# Patient Record
Sex: FEMALE | Race: WHITE | Employment: OTHER | URBAN - METROPOLITAN AREA
[De-identification: names, ages, dates, MRNs, and addresses within clinical notes are randomized per-mention and may not be internally consistent; named-entity substitution may affect disease eponyms.]

---

## 2020-11-09 ENCOUNTER — APPOINTMENT (OUTPATIENT)
Dept: GENERAL RADIOLOGY | Age: 30
DRG: 720 | End: 2020-11-09
Attending: STUDENT IN AN ORGANIZED HEALTH CARE EDUCATION/TRAINING PROGRAM
Payer: MEDICAID

## 2020-11-09 ENCOUNTER — HOSPITAL ENCOUNTER (INPATIENT)
Age: 30
LOS: 8 days | Discharge: HOME | DRG: 720 | End: 2020-11-17
Attending: STUDENT IN AN ORGANIZED HEALTH CARE EDUCATION/TRAINING PROGRAM | Admitting: FAMILY MEDICINE
Payer: MEDICAID

## 2020-11-09 ENCOUNTER — APPOINTMENT (OUTPATIENT)
Dept: GENERAL RADIOLOGY | Age: 30
DRG: 720 | End: 2020-11-09
Attending: FAMILY MEDICINE
Payer: MEDICAID

## 2020-11-09 DIAGNOSIS — R56.9 SEIZURE (HCC): Primary | ICD-10-CM

## 2020-11-09 DIAGNOSIS — R41.82 ALTERED MENTAL STATUS, UNSPECIFIED ALTERED MENTAL STATUS TYPE: ICD-10-CM

## 2020-11-09 DIAGNOSIS — N30.01 ACUTE CYSTITIS WITH HEMATURIA: ICD-10-CM

## 2020-11-09 DIAGNOSIS — A41.9 SEPSIS, DUE TO UNSPECIFIED ORGANISM, UNSPECIFIED WHETHER ACUTE ORGAN DYSFUNCTION PRESENT (HCC): ICD-10-CM

## 2020-11-09 DIAGNOSIS — J69.0 ASPIRATION PNEUMONIA, UNSPECIFIED ASPIRATION PNEUMONIA TYPE, UNSPECIFIED LATERALITY, UNSPECIFIED PART OF LUNG (HCC): ICD-10-CM

## 2020-11-09 PROBLEM — N39.0 UTI (URINARY TRACT INFECTION): Status: ACTIVE | Noted: 2020-11-09

## 2020-11-09 LAB
ALBUMIN SERPL-MCNC: 3.5 G/DL (ref 3.5–5)
ALBUMIN/GLOB SERPL: 0.8 {RATIO} (ref 1.1–2.2)
ALP SERPL-CCNC: 57 U/L (ref 45–117)
ALT SERPL-CCNC: 74 U/L (ref 12–78)
ANION GAP SERPL CALC-SCNC: 13 MMOL/L (ref 5–15)
APPEARANCE UR: ABNORMAL
AST SERPL W P-5'-P-CCNC: 147 U/L (ref 15–37)
BACTERIA URNS QL MICRO: ABNORMAL /HPF
BACTERIA URNS QL MICRO: NEGATIVE /HPF
BASOPHILS # BLD: 0 K/UL (ref 0–0.1)
BASOPHILS NFR BLD: 0 % (ref 0–1)
BILIRUB SERPL-MCNC: 0.5 MG/DL (ref 0.2–1)
BILIRUB UR QL: NEGATIVE
BUN SERPL-MCNC: 29 MG/DL (ref 6–20)
BUN/CREAT SERPL: 18 (ref 12–20)
CA-I BLD-MCNC: 8.7 MG/DL (ref 8.5–10.1)
CHLORIDE SERPL-SCNC: 121 MMOL/L (ref 97–108)
CO2 SERPL-SCNC: 15 MMOL/L (ref 21–32)
COLOR UR: ABNORMAL
CREAT SERPL-MCNC: 1.58 MG/DL (ref 0.55–1.02)
DIFFERENTIAL METHOD BLD: ABNORMAL
EOSINOPHIL # BLD: 0 K/UL (ref 0–0.4)
EOSINOPHIL NFR BLD: 0 % (ref 0–7)
ERYTHROCYTE [DISTWIDTH] IN BLOOD BY AUTOMATED COUNT: 13.5 % (ref 11.5–14.5)
GLOBULIN SER CALC-MCNC: 4.4 G/DL (ref 2–4)
GLUCOSE SERPL-MCNC: 72 MG/DL (ref 65–100)
GLUCOSE UR STRIP.AUTO-MCNC: NEGATIVE MG/DL
HCT VFR BLD AUTO: 41.8 % (ref 35–47)
HGB BLD-MCNC: 13.2 G/DL (ref 11.5–16)
HGB UR QL STRIP: ABNORMAL
IMM GRANULOCYTES # BLD AUTO: 0 K/UL (ref 0–0.04)
IMM GRANULOCYTES NFR BLD AUTO: 0 % (ref 0–0.5)
KETONES UR QL STRIP.AUTO: 80 MG/DL
LACTATE SERPL-SCNC: 0.9 MMOL/L (ref 0.4–2)
LACTATE SERPL-SCNC: 2.5 MMOL/L (ref 0.4–2)
LEUKOCYTE ESTERASE UR QL STRIP.AUTO: ABNORMAL
LYMPHOCYTES # BLD: 1 K/UL (ref 0.8–3.5)
LYMPHOCYTES NFR BLD: 7 % (ref 12–49)
MCH RBC QN AUTO: 30.6 PG (ref 26–34)
MCHC RBC AUTO-ENTMCNC: 31.6 G/DL (ref 30–36.5)
MCV RBC AUTO: 97 FL (ref 80–99)
MONOCYTES # BLD: 0.7 K/UL (ref 0–1)
MONOCYTES NFR BLD: 5 % (ref 5–13)
MUCOUS THREADS URNS QL MICRO: ABNORMAL /LPF
NEUTS SEG # BLD: 13 K/UL (ref 1.8–8)
NEUTS SEG NFR BLD: 88 % (ref 32–75)
NITRITE UR QL STRIP.AUTO: NEGATIVE
PH UR STRIP: 5 [PH] (ref 5–8)
PLATELET # BLD AUTO: 234 K/UL (ref 150–400)
PMV BLD AUTO: 11.3 FL (ref 8.9–12.9)
POTASSIUM SERPL-SCNC: 3.6 MMOL/L (ref 3.5–5.1)
PROT SERPL-MCNC: 7.9 G/DL (ref 6.4–8.2)
PROT UR STRIP-MCNC: 100 MG/DL
RBC # BLD AUTO: 4.31 M/UL (ref 3.8–5.2)
RBC #/AREA URNS HPF: 59 /HPF (ref 0–5)
RBC #/AREA URNS HPF: ABNORMAL /HPF (ref 0–5)
SARS-COV-2, COV2: NORMAL
SODIUM SERPL-SCNC: 149 MMOL/L (ref 136–145)
SP GR UR REFRACTOMETRY: 1.02 (ref 1–1.03)
UROBILINOGEN UR QL STRIP.AUTO: 0.1 EU/DL (ref 0.1–1)
WBC # BLD AUTO: 14.7 K/UL (ref 3.6–11)
WBC URNS QL MICRO: 9 /HPF (ref 0–4)
WBC URNS QL MICRO: ABNORMAL /HPF (ref 0–4)

## 2020-11-09 PROCEDURE — 83605 ASSAY OF LACTIC ACID: CPT

## 2020-11-09 PROCEDURE — 74011250636 HC RX REV CODE- 250/636: Performed by: FAMILY MEDICINE

## 2020-11-09 PROCEDURE — 74011250636 HC RX REV CODE- 250/636: Performed by: STUDENT IN AN ORGANIZED HEALTH CARE EDUCATION/TRAINING PROGRAM

## 2020-11-09 PROCEDURE — 74011000250 HC RX REV CODE- 250: Performed by: FAMILY MEDICINE

## 2020-11-09 PROCEDURE — 74011000258 HC RX REV CODE- 258: Performed by: STUDENT IN AN ORGANIZED HEALTH CARE EDUCATION/TRAINING PROGRAM

## 2020-11-09 PROCEDURE — 74011250637 HC RX REV CODE- 250/637: Performed by: STUDENT IN AN ORGANIZED HEALTH CARE EDUCATION/TRAINING PROGRAM

## 2020-11-09 PROCEDURE — 65270000029 HC RM PRIVATE

## 2020-11-09 PROCEDURE — 80053 COMPREHEN METABOLIC PANEL: CPT

## 2020-11-09 PROCEDURE — 85025 COMPLETE CBC W/AUTO DIFF WBC: CPT

## 2020-11-09 PROCEDURE — 87040 BLOOD CULTURE FOR BACTERIA: CPT

## 2020-11-09 PROCEDURE — 99284 EMERGENCY DEPT VISIT MOD MDM: CPT

## 2020-11-09 PROCEDURE — 74011250637 HC RX REV CODE- 250/637: Performed by: FAMILY MEDICINE

## 2020-11-09 PROCEDURE — 96365 THER/PROPH/DIAG IV INF INIT: CPT

## 2020-11-09 PROCEDURE — 36415 COLL VENOUS BLD VENIPUNCTURE: CPT

## 2020-11-09 PROCEDURE — 74011250636 HC RX REV CODE- 250/636

## 2020-11-09 PROCEDURE — 71045 X-RAY EXAM CHEST 1 VIEW: CPT

## 2020-11-09 PROCEDURE — 81001 URINALYSIS AUTO W/SCOPE: CPT

## 2020-11-09 PROCEDURE — 87635 SARS-COV-2 COVID-19 AMP PRB: CPT

## 2020-11-09 RX ORDER — SODIUM CHLORIDE 9 MG/ML
75 INJECTION, SOLUTION INTRAVENOUS CONTINUOUS
Status: DISCONTINUED | OUTPATIENT
Start: 2020-11-09 | End: 2020-11-11 | Stop reason: SDUPTHER

## 2020-11-09 RX ORDER — ONDANSETRON 4 MG/1
4 TABLET, ORALLY DISINTEGRATING ORAL
Status: DISCONTINUED | OUTPATIENT
Start: 2020-11-09 | End: 2020-11-17 | Stop reason: HOSPADM

## 2020-11-09 RX ORDER — POLYETHYLENE GLYCOL 3350 17 G/17G
17 POWDER, FOR SOLUTION ORAL DAILY PRN
Status: DISCONTINUED | OUTPATIENT
Start: 2020-11-09 | End: 2020-11-17 | Stop reason: HOSPADM

## 2020-11-09 RX ORDER — ACETAMINOPHEN 650 MG/1
650 SUPPOSITORY RECTAL
Status: COMPLETED | OUTPATIENT
Start: 2020-11-09 | End: 2020-11-09

## 2020-11-09 RX ORDER — LORAZEPAM 2 MG/ML
2 INJECTION INTRAMUSCULAR ONCE
Status: ACTIVE | OUTPATIENT
Start: 2020-11-09 | End: 2020-11-09

## 2020-11-09 RX ORDER — ACETAMINOPHEN 325 MG/1
650 TABLET ORAL
Status: DISCONTINUED | OUTPATIENT
Start: 2020-11-09 | End: 2020-11-10

## 2020-11-09 RX ORDER — ONDANSETRON 2 MG/ML
4 INJECTION INTRAMUSCULAR; INTRAVENOUS
Status: DISCONTINUED | OUTPATIENT
Start: 2020-11-09 | End: 2020-11-17 | Stop reason: HOSPADM

## 2020-11-09 RX ORDER — LEVETIRACETAM 15 MG/ML
1500 INJECTION INTRAVASCULAR EVERY 12 HOURS
Status: DISCONTINUED | OUTPATIENT
Start: 2020-11-09 | End: 2020-11-14

## 2020-11-09 RX ORDER — TOPIRAMATE 100 MG/1
400 TABLET, FILM COATED ORAL DAILY
COMMUNITY

## 2020-11-09 RX ORDER — ENOXAPARIN SODIUM 100 MG/ML
40 INJECTION SUBCUTANEOUS DAILY
Status: DISCONTINUED | OUTPATIENT
Start: 2020-11-10 | End: 2020-11-17 | Stop reason: HOSPADM

## 2020-11-09 RX ORDER — LEVETIRACETAM 10 MG/ML
1000 INJECTION INTRAVASCULAR ONCE
Status: COMPLETED | OUTPATIENT
Start: 2020-11-09 | End: 2020-11-09

## 2020-11-09 RX ORDER — TOPIRAMATE 200 MG/1
200 TABLET ORAL DAILY
COMMUNITY

## 2020-11-09 RX ORDER — LEVETIRACETAM 500 MG/1
1500 TABLET ORAL 2 TIMES DAILY
Status: DISCONTINUED | OUTPATIENT
Start: 2020-11-09 | End: 2020-11-14

## 2020-11-09 RX ORDER — LEVETIRACETAM 500 MG/1
1500 TABLET ORAL 2 TIMES DAILY
COMMUNITY

## 2020-11-09 RX ORDER — TOPIRAMATE 100 MG/1
200 TABLET, FILM COATED ORAL
Status: DISCONTINUED | OUTPATIENT
Start: 2020-11-09 | End: 2020-11-17 | Stop reason: HOSPADM

## 2020-11-09 RX ORDER — ACETAMINOPHEN 650 MG/1
650 SUPPOSITORY RECTAL
Status: DISCONTINUED | OUTPATIENT
Start: 2020-11-09 | End: 2020-11-10

## 2020-11-09 RX ORDER — TOPIRAMATE 100 MG/1
400 TABLET, FILM COATED ORAL
Status: DISCONTINUED | OUTPATIENT
Start: 2020-11-10 | End: 2020-11-17 | Stop reason: HOSPADM

## 2020-11-09 RX ORDER — LEVETIRACETAM 10 MG/ML
INJECTION INTRAVASCULAR
Status: COMPLETED
Start: 2020-11-09 | End: 2020-11-09

## 2020-11-09 RX ORDER — SODIUM BICARBONATE 1 MEQ/ML
50 SYRINGE (ML) INTRAVENOUS ONCE
Status: COMPLETED | OUTPATIENT
Start: 2020-11-09 | End: 2020-11-09

## 2020-11-09 RX ADMIN — SODIUM CHLORIDE 1000 ML: 9 INJECTION, SOLUTION INTRAVENOUS at 13:50

## 2020-11-09 RX ADMIN — ACETAMINOPHEN 650 MG: 650 SUPPOSITORY RECTAL at 09:55

## 2020-11-09 RX ADMIN — SODIUM CHLORIDE 75 ML/HR: 9 INJECTION, SOLUTION INTRAVENOUS at 19:26

## 2020-11-09 RX ADMIN — LEVETIRACETAM 1000 MG: 10 INJECTION INTRAVENOUS at 08:52

## 2020-11-09 RX ADMIN — LEVETIRACETAM 1000 MG: 10 INJECTION INTRAVASCULAR at 08:52

## 2020-11-09 RX ADMIN — CEFTRIAXONE 1 G: 1 INJECTION, POWDER, FOR SOLUTION INTRAMUSCULAR; INTRAVENOUS at 11:31

## 2020-11-09 RX ADMIN — SODIUM CHLORIDE 1000 ML: 9 INJECTION, SOLUTION INTRAVENOUS at 11:31

## 2020-11-09 RX ADMIN — SODIUM BICARBONATE 50 MEQ: 84 INJECTION, SOLUTION INTRAVENOUS at 13:47

## 2020-11-09 NOTE — H&P
History and Physical    NAME: Malik Tucker   :  1990   MRN:  [id number]     Date/Time:  2020 1:19 PM    Patient PCP: Luan Paget, MD  ______________________________________________________________________             Subjective:     CHIEF COMPLAINT:     Seizure, fever, vomiting    HISTORY OF PRESENT ILLNESS:       Patient is a 35y.o. year old female with a past medical history of Angelman syndrome and seizures presents to the ER with vomiting and fever. Symptoms started last night at 8 pm last night. The farther noticed that she was lethargic and not acting herself. She vomited 4 times, with black coloration. Father took her temperature last night at 101, then 104. She is not eating or drinking well. She had a seizure in the ER and was treated with Ativan 2mg and Keppra 1g IV. Family went to [facility] last week and he noticed diarrhea in her diaper. He also notes that they hired a new nanny 2 weeks ago, and thinks maybe she does not know how to properly clean his daughter. Patient is nonverbal at baseline, bed bound, and has frequent seizures. History acquired from father    No note of coughing, SOB, or chest pain. Father says she has not complained of abdominal pain or flank pain. Past Medical History:   Diagnosis Date    Angelman's syndrome     [facility]     [facility] ([facility]         No past surgical history on file. Social History     Tobacco Use    Smoking status: Never Smoker    Smokeless tobacco: Never Used   Substance Use Topics    Alcohol use: Never     Frequency: Never        No family history on file.     No Known Allergies     Prior to Admission medications    Not on File         Current Facility-Administered Medications:     LORazepam (ATIVAN) injection 2 mg, 2 mg, IntraVENous, ONCE, Zelensky, Judieth Gosselin, MD    sodium bicarbonate 8.4 % (1 mEq/mL) injection 50 mEq, 50 mEq, IntraVENous, ONCE, Unique Carpio MD    cefTRIAXone (ROCEPHIN) 1 g in 0.9% sodium chloride (MBP/ADV) 50 mL MBP, 1 g, IntraVENous, Q24H, Unique Carpio MD  No current outpatient medications on file. LAB DATA REVIEWED:    Recent Results (from the past 24 hour(s))   URINALYSIS W/ RFLX MICROSCOPIC    Collection Time: 11/09/20  8:30 AM   Result Value Ref Range    Color Yellow/Straw      Appearance Turbid (A) Clear      Specific gravity 1.020 1.003 - 1.030      pH (UA) 5.0 5.0 - 8.0      Protein 100 (A) Negative mg/dL    Glucose Negative Negative mg/dL    Ketone 80 (A) Negative mg/dL    Bilirubin Negative Negative      Blood Large (A) Negative      Urobilinogen 0.1 0.1 - 1.0 EU/dL    Nitrites Negative Negative      Leukocyte Esterase Trace (A) Negative      WBC 5-10 0 - 4 /hpf    RBC  0 - 5 /hpf    Bacteria Negative Negative /hpf    Mucus Trace /lpf   URINE MICROSCOPIC    Collection Time: 11/09/20  8:30 AM   Result Value Ref Range    WBC 9 (H) 0 - 4 /hpf    RBC 59 (H) 0 - 5 /hpf    Bacteria Rare (A) Negative /hpf   LACTIC ACID    Collection Time: 11/09/20  9:15 AM   Result Value Ref Range    Lactic acid 2.5 (HH) 0.4 - 2.0 mmol/L   CBC WITH AUTOMATED DIFF    Collection Time: 11/09/20  9:15 AM   Result Value Ref Range    WBC 14.7 (H) 3.6 - 11.0 K/uL    RBC 4.31 3.80 - 5.20 M/uL    HGB 13.2 11.5 - 16.0 g/dL    HCT 41.8 35.0 - 47.0 %    MCV 97.0 80.0 - 99.0 FL    MCH 30.6 26.0 - 34.0 PG    MCHC 31.6 30.0 - 36.5 g/dL    RDW 13.5 11.5 - 14.5 %    PLATELET 984 084 - 524 K/uL    MPV 11.3 8.9 - 12.9 FL    NEUTROPHILS 88 (H) 32 - 75 %    LYMPHOCYTES 7 (L) 12 - 49 %    MONOCYTES 5 5 - 13 %    EOSINOPHILS 0 0 - 7 %    BASOPHILS 0 0 - 1 %    IMMATURE GRANULOCYTES 0 0.0 - 0.5 %    ABS. NEUTROPHILS 13.0 (H) 1.8 - 8.0 K/UL    ABS. LYMPHOCYTES 1.0 0.8 - 3.5 K/UL    ABS. MONOCYTES 0.7 0.0 - 1.0 K/UL    ABS. EOSINOPHILS 0.0 0.0 - 0.4 K/UL    ABS. BASOPHILS 0.0 0.0 - 0.1 K/UL    ABS. IMM.  GRANS. 0.0 0.00 - 0.04 K/UL    DF AUTOMATED     METABOLIC PANEL, COMPREHENSIVE    Collection Time: 11/09/20  9:15 AM   Result Value Ref Range    Sodium 149 (H) 136 - 145 mmol/L    Potassium 3.6 3.5 - 5.1 mmol/L    Chloride 121 (H) 97 - 108 mmol/L    CO2 15 (LL) 21 - 32 mmol/L    Anion gap 13 5 - 15 mmol/L    Glucose 72 65 - 100 mg/dL    BUN 29 (H) 6 - 20 mg/dL    Creatinine 1.58 (H) 0.55 - 1.02 mg/dL    BUN/Creatinine ratio 18 12 - 20      GFR est AA 47 (L) >60 ml/min/1.73m2    GFR est non-AA 39 (L) >60 ml/min/1.73m2    Calcium 8.7 8.5 - 10.1 mg/dL    Bilirubin, total 0.5 0.2 - 1.0 mg/dL    AST (SGOT) 147 (H) 15 - 37 U/L    ALT (SGPT) 74 12 - 78 U/L    Alk. phosphatase 57 45 - 117 U/L    Protein, total 7.9 6.4 - 8.2 g/dL    Albumin 3.5 3.5 - 5.0 g/dL    Globulin 4.4 (H) 2.0 - 4.0 g/dL    A-G Ratio 0.8 (L) 1.1 - 2.2         XR Results (most recent):  No results found for this or any previous visit. No orders to display        Review of Systems:    Unable to obtain from patient. Objective:   VITALS:    Visit Vitals  /81   Pulse 83   Temp 99.7 °F (37.6 °C)   Resp 26   Ht 5' (1.524 m)   Wt 51.7 kg (114 lb)   SpO2 100%   BMI 22.26 kg/m²       Physical Exam:   Constitutional: pt is awake in the bed, eyes open  HENT:   Head: Normocephalic and atraumatic. Eyes: Pupils are equal, round, and reactive to light. EOM are normal.   Cardiovascular: Normal rate, regular rhythm and normal heart sounds. Pulmonary/Chest: Breath sounds normal. No wheezes. No rales. Exhibits no tenderness. Abdominal: Soft. Bowel sounds are normal. There is no abdominal tenderness. There is no rebound and no guarding. Musculoskeletal: Normal range of motion.    Neurological: Lethargic      ASSESSMENT & PLAN:    Sepsis  Complicated UTI  History of Seizures  Angelman Syndrome  Lactic acidosis  Metabolic acidosis      Ceftriaxone 1g IV daily  Bicarb 8.4 mEq IV dose  Lorazepam 2mg IV prn   Blood cultures  Chest Xray  COVID rule out  Urine culture  Neurology consult      Current Facility-Administered Medications:     LORazepam (ATIVAN) injection 2 mg, 2 mg, IntraVENous, ONCE, Roland Peacock MD    sodium bicarbonate 8.4 % (1 mEq/mL) injection 50 mEq, 50 mEq, IntraVENous, ONCE, Unique Carpio MD    cefTRIAXone (ROCEPHIN) 1 g in 0.9% sodium chloride (MBP/ADV) 50 mL MBP, 1 g, IntraVENous, Q24H, Haroon Carpio MD  No current outpatient medications on file.      ________________________________________________________________________    Signed: Hernando Ferrer MD

## 2020-11-09 NOTE — ACP (ADVANCE CARE PLANNING)
11/9/20. CM spoke with pts father/healthcare decision maker  Olivia Sheth @ [phone]) who is visiting. His back up is Manoj Mendoza @ [phone]. Performance is for CPR if pts heart stops & Ventilator Support if health worsened from Covid 19 recovery likely or not.

## 2020-11-09 NOTE — ED PROVIDER NOTES
EMERGENCY DEPARTMENT HISTORY AND PHYSICAL EXAM      Date: 11/9/2020  Patient Name: Jayden Zhou    History of Presenting Illness     Chief Complaint   Patient presents with    Seizure    Fever    Vomiting     History Provided By: Patient's Father    HPI: Jayden Zhou, 35 y.o. female with a past medical history significant Angelman syndrome presents to the ED with cc of fever, vomiting, seizure. Patient's father presents history, states overnight he noted patient to be febrile to 104. Noted some vomitus near mouth. Patient nonverbal at baseline bedbound, has frequent seizures, according to father approximately 30-40 a day. Patient actively seizing when EMS arrived, was given Versed 2.5 mg, with cessation of seizure. On presentation patient had another seizure. Was given Ativan 2 mg, and Keppra 1 g IV. Father states patient takes 1500 mg of Keppra twice a day, has not taken in 24 hours. No note of any coughing, no obvious shortness of breath, patient has been eating less over the last several days. There are no other complaints, changes, or physical findings at this time. PCP: Britney, MD Carmen    Current Facility-Administered Medications   Medication Dose Route Frequency Provider Last Rate Last Dose    LORazepam (ATIVAN) injection 2 mg  2 mg IntraVENous ONCE Sae Franklin MD        sodium chloride 0.9 % bolus infusion 1,000 mL  1,000 mL IntraVENous ONCE Leah ALBARADO MD        cefTRIAXone (ROCEPHIN) 1 g in 0.9% sodium chloride (MBP/ADV) 50 mL MBP  1 g IntraVENous NOW Natividad Mcfadden MD           Past History     Past Medical History:  Past Medical History:   Diagnosis Date    Angelman's syndrome     [facility]     [facility]izures [facility]        Past Surgical History:  No past surgical history on file. Family History:  No family history on file.     Social History:  Social History     Tobacco Use    Smoking status: Never Smoker    Smokeless tobacco: Never Used   Substance Use Topics    Alcohol use: Never     Frequency: Never    Drug use: Never       Allergies:  No Known Allergies      Review of Systems     Review of Systems   Unable to perform ROS: Patient nonverbal       Physical Exam     Physical Exam  Vitals signs and nursing note reviewed. Constitutional:       General: She is sleeping. Appearance: Normal appearance. She is underweight. HENT:      Head: Normocephalic and atraumatic. Nose: Nose normal.      Mouth/Throat:      Mouth: Mucous membranes are moist.   Eyes:      Pupils: Pupils are equal, round, and reactive to light. Neck:      Musculoskeletal: No neck rigidity. Cardiovascular:      Rate and Rhythm: Normal rate and regular rhythm. Pulmonary:      Effort: Pulmonary effort is normal.      Breath sounds: Normal breath sounds. Abdominal:      General: Abdomen is flat. Bowel sounds are normal.      Palpations: Abdomen is soft. Musculoskeletal:         General: No swelling, tenderness or deformity. Lymphadenopathy:      Cervical: No cervical adenopathy. Skin:     General: Skin is warm and dry. Capillary Refill: Capillary refill takes less than 2 seconds. Neurological:      Mental Status: She is lethargic. Comments: Extremities contracted, at baseline.              Diagnostic Study Results     Labs -     Recent Results (from the past 12 hour(s))   URINALYSIS W/ RFLX MICROSCOPIC    Collection Time: 11/09/20  8:30 AM   Result Value Ref Range    Color Yellow/Straw      Appearance Turbid (A) Clear      Specific gravity 1.020 1.003 - 1.030      pH (UA) 5.0 5.0 - 8.0      Protein 100 (A) Negative mg/dL    Glucose Negative Negative mg/dL    Ketone 80 (A) Negative mg/dL    Bilirubin Negative Negative      Blood Large (A) Negative      Urobilinogen 0.1 0.1 - 1.0 EU/dL    Nitrites Negative Negative      Leukocyte Esterase Trace (A) Negative      WBC 5-10 0 - 4 /hpf    RBC  0 - 5 /hpf    Bacteria Negative Negative /hpf    Mucus Trace /lpf   URINE MICROSCOPIC    Collection Time: 11/09/20  8:30 AM   Result Value Ref Range    WBC PENDING /hpf    RBC PENDING /hpf    Bacteria PENDING /hpf   LACTIC ACID    Collection Time: 11/09/20  9:15 AM   Result Value Ref Range    Lactic acid 2.5 (HH) 0.4 - 2.0 mmol/L   CBC WITH AUTOMATED DIFF    Collection Time: 11/09/20  9:15 AM   Result Value Ref Range    WBC 14.7 (H) 3.6 - 11.0 K/uL    RBC 4.31 3.80 - 5.20 M/uL    HGB 13.2 11.5 - 16.0 g/dL    HCT 41.8 35.0 - 47.0 %    MCV 97.0 80.0 - 99.0 FL    MCH 30.6 26.0 - 34.0 PG    MCHC 31.6 30.0 - 36.5 g/dL    RDW 13.5 11.5 - 14.5 %    PLATELET 282 640 - 312 K/uL    MPV 11.3 8.9 - 12.9 FL    NEUTROPHILS 88 (H) 32 - 75 %    LYMPHOCYTES 7 (L) 12 - 49 %    MONOCYTES 5 5 - 13 %    EOSINOPHILS 0 0 - 7 %    BASOPHILS 0 0 - 1 %    IMMATURE GRANULOCYTES 0 0.0 - 0.5 %    ABS. NEUTROPHILS 13.0 (H) 1.8 - 8.0 K/UL    ABS. LYMPHOCYTES 1.0 0.8 - 3.5 K/UL    ABS. MONOCYTES 0.7 0.0 - 1.0 K/UL    ABS. EOSINOPHILS 0.0 0.0 - 0.4 K/UL    ABS. BASOPHILS 0.0 0.0 - 0.1 K/UL    ABS. IMM. GRANS. 0.0 0.00 - 0.04 K/UL    DF AUTOMATED     METABOLIC PANEL, COMPREHENSIVE    Collection Time: 11/09/20  9:15 AM   Result Value Ref Range    Sodium 149 (H) 136 - 145 mmol/L    Potassium 3.6 3.5 - 5.1 mmol/L    Chloride 121 (H) 97 - 108 mmol/L    CO2 15 (LL) 21 - 32 mmol/L    Anion gap 13 5 - 15 mmol/L    Glucose 72 65 - 100 mg/dL    BUN 29 (H) 6 - 20 mg/dL    Creatinine 1.58 (H) 0.55 - 1.02 mg/dL    BUN/Creatinine ratio 18 12 - 20      GFR est AA 47 (L) >60 ml/min/1.73m2    GFR est non-AA 39 (L) >60 ml/min/1.73m2    Calcium 8.7 8.5 - 10.1 mg/dL    Bilirubin, total 0.5 0.2 - 1.0 mg/dL    AST (SGOT) 147 (H) 15 - 37 U/L    ALT (SGPT) 74 12 - 78 U/L    Alk.  phosphatase 57 45 - 117 U/L    Protein, total 7.9 6.4 - 8.2 g/dL    Albumin 3.5 3.5 - 5.0 g/dL    Globulin 4.4 (H) 2.0 - 4.0 g/dL    A-G Ratio 0.8 (L) 1.1 - 2.2         Radiologic Studies -   [unfilled]  CT Results  (Last 48 hours)    None        CXR Results  (Last 48 hours)    None          Medical Decision Making and ED Course   I am the first provider for this patient. I reviewed the vital signs, available nursing notes, past medical history, past surgical history, family history and social history. Vital Signs-Reviewed the patient's vital signs. Patient Vitals for the past 12 hrs:   Temp Pulse Resp BP SpO2   11/09/20 0828 99.7 °F (37.6 °C) (!) 107 26 120/78 96 %       Records Reviewed: Nursing Notes    The patient presents with fever with a differential diagnosis of sepsis, UTI, URI, COVID-19 infection      Provider Notes (Medical Decision Making):   32-year-old female, past medical history of Angelman's syndrome, seizure disorder. Patient brought in from by father for evaluation of fever, was febrile to 104 last night, and lethargy. On evaluation patient had another witnessed seizure in the ED, treated with Ativan and Keppra 1 g. With seizure sessation. Patient febrile to 101 rectally, basic lab work drawn including blood cultures, lactate, UA. Will continue to observe patient and follow up laboratory studies. [facility]         ED Course:   Initial assessment performed. The patients presenting problems have been discussed, and they are in agreement with the care plan formulated and outlined with them. I have encouraged them to ask questions as they arise throughout their visit. ED Course as of Nov 09 1115   Mon Nov 09, 2020   1034 Patient's white count elevated 14,000, shift of 88%, lactate elevated 2.5, CMP shows bicarb 15, BUN 29 creatinine 1.58.  UA shows positive ketones, large blood, trace leukoesterase. Potentially UTI could be source. Will treat with Rocephin, given complicated medical history will admit for IV antibiotics and UTI. [PZ]   3020 Given patient's UTI, and multiple medical comorbidities, will admit for IV antibiotics, treat for sepsis. Spoke with Dr. Adelso Aguilar, endorsed admission.      [PZ]      ED Course User Index  [PZ] Diana Ojeda, Pranav Rey MD         Procedures       Hilaria Mane MD  Procedures       DISPO: Admit to hospital          Attestations:    Hilaria Mane MD    Please note that this dictation was completed with [employer], the computer voice recognition software. Quite often unanticipated grammatical, syntax, homophones, and other interpretive errors are inadvertently transcribed by the computer software. Please disregard these errors. Please excuse any errors that have escaped final proofreading. Thank you.

## 2020-11-09 NOTE — H&P
History and Physical    NAME: Malik Tucker   :  1990   MRN:  [id number]     Date/Time:  2020 1:19 PM    Patient PCP: Luan Paget, MD  ______________________________________________________________________             Subjective:     CHIEF COMPLAINT:     Seizure, fever, vomiting    HISTORY OF PRESENT ILLNESS:       Patient is a 35y.o. year old female with a past medical history of Angelman syndrome and seizures presents to the ER with vomiting and fever. Symptoms started last night at 8 pm last night. The farther noticed that she was lethargic and not acting herself. She vomited 4 times, with black coloration. Father took her temperature last night at 101, then 104. She is not eating or drinking well. She had a seizure in the ER and was treated with Ativan 2mg and Keppra 1g IV. Family went to [facility] last week and he noticed diarrhea in her diaper. He also notes that they hired a new nanny 2 weeks ago, and thinks maybe she does not know how to properly clean his daughter. Patient is nonverbal at baseline, bed bound, and has frequent seizures. History acquired from father    No note of coughing, SOB, or chest pain. Father says she has not complained of abdominal pain or flank pain. Past Medical History:   Diagnosis Date    Angelman's syndrome     [facility]     [facility]         No past surgical history on file. Social History     Tobacco Use    Smoking status: Never Smoker    Smokeless tobacco: Never Used   Substance Use Topics    Alcohol use: Never     Frequency: Never        No family history on file. No Known Allergies     Prior to Admission medications    Not on File         Current Facility-Administered Medications:     LORazepam (ATIVAN) injection 2 mg, 2 mg, IntraVENous, ONCE, Zelensky, Judieth Gosselin, MD  No current outpatient medications on file.     LAB DATA REVIEWED:    Recent Results (from the past 24 hour(s))   URINALYSIS W/ RFLX MICROSCOPIC    Collection Time: 11/09/20  8:30 AM   Result Value Ref Range    Color Yellow/Straw      Appearance Turbid (A) Clear      Specific gravity 1.020 1.003 - 1.030      pH (UA) 5.0 5.0 - 8.0      Protein 100 (A) Negative mg/dL    Glucose Negative Negative mg/dL    Ketone 80 (A) Negative mg/dL    Bilirubin Negative Negative      Blood Large (A) Negative      Urobilinogen 0.1 0.1 - 1.0 EU/dL    Nitrites Negative Negative      Leukocyte Esterase Trace (A) Negative      WBC 5-10 0 - 4 /hpf    RBC  0 - 5 /hpf    Bacteria Negative Negative /hpf    Mucus Trace /lpf   URINE MICROSCOPIC    Collection Time: 11/09/20  8:30 AM   Result Value Ref Range    WBC 9 (H) 0 - 4 /hpf    RBC 59 (H) 0 - 5 /hpf    Bacteria Rare (A) Negative /hpf   LACTIC ACID    Collection Time: 11/09/20  9:15 AM   Result Value Ref Range    Lactic acid 2.5 (HH) 0.4 - 2.0 mmol/L   CBC WITH AUTOMATED DIFF    Collection Time: 11/09/20  9:15 AM   Result Value Ref Range    WBC 14.7 (H) 3.6 - 11.0 K/uL    RBC 4.31 3.80 - 5.20 M/uL    HGB 13.2 11.5 - 16.0 g/dL    HCT 41.8 35.0 - 47.0 %    MCV 97.0 80.0 - 99.0 FL    MCH 30.6 26.0 - 34.0 PG    MCHC 31.6 30.0 - 36.5 g/dL    RDW 13.5 11.5 - 14.5 %    PLATELET 029 794 - 695 K/uL    MPV 11.3 8.9 - 12.9 FL    NEUTROPHILS 88 (H) 32 - 75 %    LYMPHOCYTES 7 (L) 12 - 49 %    MONOCYTES 5 5 - 13 %    EOSINOPHILS 0 0 - 7 %    BASOPHILS 0 0 - 1 %    IMMATURE GRANULOCYTES 0 0.0 - 0.5 %    ABS. NEUTROPHILS 13.0 (H) 1.8 - 8.0 K/UL    ABS. LYMPHOCYTES 1.0 0.8 - 3.5 K/UL    ABS. MONOCYTES 0.7 0.0 - 1.0 K/UL    ABS. EOSINOPHILS 0.0 0.0 - 0.4 K/UL    ABS. BASOPHILS 0.0 0.0 - 0.1 K/UL    ABS. IMM.  GRANS. 0.0 0.00 - 0.04 K/UL    DF AUTOMATED     METABOLIC PANEL, COMPREHENSIVE    Collection Time: 11/09/20  9:15 AM   Result Value Ref Range    Sodium 149 (H) 136 - 145 mmol/L    Potassium 3.6 3.5 - 5.1 mmol/L    Chloride 121 (H) 97 - 108 mmol/L    CO2 15 (LL) 21 - 32 mmol/L    Anion gap 13 5 - 15 mmol/L    Glucose 72 65 - 100 mg/dL    BUN 29 (H) 6 - 20 mg/dL    Creatinine 1.58 (H) 0.55 - 1.02 mg/dL    BUN/Creatinine ratio 18 12 - 20      GFR est AA 47 (L) >60 ml/min/1.73m2    GFR est non-AA 39 (L) >60 ml/min/1.73m2    Calcium 8.7 8.5 - 10.1 mg/dL    Bilirubin, total 0.5 0.2 - 1.0 mg/dL    AST (SGOT) 147 (H) 15 - 37 U/L    ALT (SGPT) 74 12 - 78 U/L    Alk. phosphatase 57 45 - 117 U/L    Protein, total 7.9 6.4 - 8.2 g/dL    Albumin 3.5 3.5 - 5.0 g/dL    Globulin 4.4 (H) 2.0 - 4.0 g/dL    A-G Ratio 0.8 (L) 1.1 - 2.2         XR Results (most recent):  No results found for this or any previous visit. No orders to display        Review of Systems:    Unable to obtain from patient. Objective:   VITALS:    Visit Vitals  /81   Pulse 83   Temp 99.7 °F (37.6 °C)   Resp 26   Ht 5' (1.524 m)   Wt 51.7 kg (114 lb)   SpO2 100%   BMI 22.26 kg/m²       Physical Exam:   Constitutional: pt is awake in the bed, eyes open  HENT:   Head: Normocephalic and atraumatic. Eyes: Pupils are equal, round, and reactive to light. EOM are normal.   Cardiovascular: Normal rate, regular rhythm and normal heart sounds. Pulmonary/Chest: Breath sounds normal. No wheezes. No rales. Exhibits no tenderness. Abdominal: Soft. Bowel sounds are normal. There is no abdominal tenderness. There is no rebound and no guarding. Musculoskeletal: Normal range of motion.    Neurological: Lethargic      ASSESSMENT & PLAN:    Sepsis  Complicated UTI  History of Seizures  Angelman Syndrome      Ceftriaxone 1g IV  Bicarb 50 mEq IV  Lorazepam 2mg IV  Lactic acid 2.5  Blood cultures  Chest Xray  COVID rule out  Urine culture    ________________________________________________________________________    Signed: Timoteo Watson

## 2020-11-09 NOTE — ED TRIAGE NOTES
Started feeling bad yesterday, patient started having seizures and fever, hx of seizures and angelmans syndrome

## 2020-11-09 NOTE — ED NOTES
Nurse walking by room and witness pt having seizure. Pt's father very calm talking to pt and rubbing her head while pt seizing. Lasted maybe 45 seconds. After seizure pt post dictal  With snoring respirations but able to maintain optimal saturations on monitor.

## 2020-11-09 NOTE — ROUTINE PROCESS
TRANSFER - OUT REPORT: 
 
Verbal report given to [facility], RN(name) on Elli Rosenbaum  being transferred to [facility](unit) for routine progression of care Report consisted of patients Situation, Background, Assessment and  
Recommendations(SBAR). Information from the following report(s) ED Summary was reviewed with the receiving nurse. Lines:  
Peripheral IV 11/09/20 Left Antecubital (Active) Phlebitis Assessment 0 11/09/20 0844 Dressing Status Clean, dry, & intact 11/09/20 0844 Dressing Type Transparent 11/09/20 0844 Alcohol Cap Used Yes 11/09/20 0844 Opportunity for questions and clarification was provided. Patient transported with: 
 [employer]

## 2020-11-10 LAB
ANION GAP SERPL CALC-SCNC: 16 MMOL/L (ref 5–15)
BASOPHILS # BLD: 0 K/UL (ref 0–0.1)
BASOPHILS NFR BLD: 0 % (ref 0–1)
BUN SERPL-MCNC: 25 MG/DL (ref 6–20)
BUN/CREAT SERPL: 18 (ref 12–20)
CA-I BLD-MCNC: 9 MG/DL (ref 8.5–10.1)
CHLORIDE SERPL-SCNC: 127 MMOL/L (ref 97–108)
CO2 SERPL-SCNC: 17 MMOL/L (ref 21–32)
CREAT SERPL-MCNC: 1.38 MG/DL (ref 0.55–1.02)
DIFFERENTIAL METHOD BLD: ABNORMAL
EOSINOPHIL # BLD: 0 K/UL (ref 0–0.4)
EOSINOPHIL NFR BLD: 0 % (ref 0–7)
ERYTHROCYTE [DISTWIDTH] IN BLOOD BY AUTOMATED COUNT: 14.1 % (ref 11.5–14.5)
GLUCOSE SERPL-MCNC: 49 MG/DL (ref 65–100)
HCT VFR BLD AUTO: 39.5 % (ref 35–47)
HGB BLD-MCNC: 12.3 G/DL (ref 11.5–16)
IMM GRANULOCYTES # BLD AUTO: 0 K/UL (ref 0–0.04)
IMM GRANULOCYTES NFR BLD AUTO: 0 % (ref 0–0.5)
LYMPHOCYTES # BLD: 0.5 K/UL (ref 0.8–3.5)
LYMPHOCYTES NFR BLD: 4 % (ref 12–49)
MCH RBC QN AUTO: 30.1 PG (ref 26–34)
MCHC RBC AUTO-ENTMCNC: 31.1 G/DL (ref 30–36.5)
MCV RBC AUTO: 96.8 FL (ref 80–99)
MONOCYTES # BLD: 0.8 K/UL (ref 0–1)
MONOCYTES NFR BLD: 6 % (ref 5–13)
NEUTS SEG # BLD: 11.1 K/UL (ref 1.8–8)
NEUTS SEG NFR BLD: 90 % (ref 32–75)
PLATELET # BLD AUTO: 214 K/UL (ref 150–400)
PMV BLD AUTO: 10.9 FL (ref 8.9–12.9)
POTASSIUM SERPL-SCNC: 3.4 MMOL/L (ref 3.5–5.1)
RBC # BLD AUTO: 4.08 M/UL (ref 3.8–5.2)
SODIUM SERPL-SCNC: 160 MMOL/L (ref 136–145)
WBC # BLD AUTO: 12.4 K/UL (ref 3.6–11)

## 2020-11-10 PROCEDURE — 74011250636 HC RX REV CODE- 250/636: Performed by: FAMILY MEDICINE

## 2020-11-10 PROCEDURE — 85025 COMPLETE CBC W/AUTO DIFF WBC: CPT

## 2020-11-10 PROCEDURE — 80048 BASIC METABOLIC PNL TOTAL CA: CPT

## 2020-11-10 PROCEDURE — 95816 EEG AWAKE AND DROWSY: CPT | Performed by: PSYCHIATRY & NEUROLOGY

## 2020-11-10 PROCEDURE — 94762 N-INVAS EAR/PLS OXIMTRY CONT: CPT

## 2020-11-10 PROCEDURE — 65270000029 HC RM PRIVATE

## 2020-11-10 PROCEDURE — 74011250636 HC RX REV CODE- 250/636: Performed by: INTERNAL MEDICINE

## 2020-11-10 PROCEDURE — 36415 COLL VENOUS BLD VENIPUNCTURE: CPT

## 2020-11-10 PROCEDURE — 74011000258 HC RX REV CODE- 258: Performed by: FAMILY MEDICINE

## 2020-11-10 PROCEDURE — 74011250637 HC RX REV CODE- 250/637: Performed by: FAMILY MEDICINE

## 2020-11-10 RX ORDER — LORAZEPAM 2 MG/ML
1 INJECTION INTRAMUSCULAR
Status: DISCONTINUED | OUTPATIENT
Start: 2020-11-10 | End: 2020-11-17 | Stop reason: HOSPADM

## 2020-11-10 RX ORDER — LORAZEPAM 2 MG/ML
1 INJECTION INTRAMUSCULAR ONCE
Status: COMPLETED | OUTPATIENT
Start: 2020-11-10 | End: 2020-11-10

## 2020-11-10 RX ORDER — NORETHINDRONE ACETATE AND ETHINYL ESTRADIOL 1; .02 MG/1; MG/1
1 TABLET ORAL DAILY
Status: DISCONTINUED | OUTPATIENT
Start: 2020-11-10 | End: 2020-11-17 | Stop reason: HOSPADM

## 2020-11-10 RX ORDER — ACETAMINOPHEN 325 MG/1
650 TABLET ORAL
Status: DISCONTINUED | OUTPATIENT
Start: 2020-11-10 | End: 2020-11-17 | Stop reason: HOSPADM

## 2020-11-10 RX ORDER — ACETAMINOPHEN 650 MG/1
650 SUPPOSITORY RECTAL
Status: DISCONTINUED | OUTPATIENT
Start: 2020-11-10 | End: 2020-11-17 | Stop reason: HOSPADM

## 2020-11-10 RX ADMIN — VANCOMYCIN HYDROCHLORIDE 750 MG: 750 INJECTION, POWDER, LYOPHILIZED, FOR SOLUTION INTRAVENOUS at 10:18

## 2020-11-10 RX ADMIN — LORAZEPAM 1 MG: 2 INJECTION, SOLUTION INTRAMUSCULAR; INTRAVENOUS at 22:04

## 2020-11-10 RX ADMIN — PIPERACILLIN AND TAZOBACTAM 3.38 G: 3; .375 INJECTION, POWDER, LYOPHILIZED, FOR SOLUTION INTRAVENOUS at 15:07

## 2020-11-10 RX ADMIN — ACETAMINOPHEN 650 MG: 650 SUPPOSITORY RECTAL at 22:12

## 2020-11-10 RX ADMIN — LORAZEPAM 1 MG: 2 INJECTION, SOLUTION INTRAMUSCULAR; INTRAVENOUS at 20:12

## 2020-11-10 RX ADMIN — ACETAMINOPHEN 650 MG: 650 SUPPOSITORY RECTAL at 01:17

## 2020-11-10 RX ADMIN — PIPERACILLIN AND TAZOBACTAM 3.38 G: 3; .375 INJECTION, POWDER, LYOPHILIZED, FOR SOLUTION INTRAVENOUS at 06:01

## 2020-11-10 RX ADMIN — ENOXAPARIN SODIUM 40 MG: 40 INJECTION SUBCUTANEOUS at 10:18

## 2020-11-10 RX ADMIN — ACETAMINOPHEN 650 MG: 650 SUPPOSITORY RECTAL at 10:39

## 2020-11-10 RX ADMIN — LEVETIRACETAM 1500 MG: 15 INJECTION INTRAVENOUS at 12:14

## 2020-11-10 RX ADMIN — ACETAMINOPHEN 650 MG: 650 SUPPOSITORY RECTAL at 06:01

## 2020-11-10 RX ADMIN — CEFTRIAXONE 1 G: 1 INJECTION, POWDER, FOR SOLUTION INTRAMUSCULAR; INTRAVENOUS at 13:31

## 2020-11-10 RX ADMIN — LEVETIRACETAM 1500 MG: 15 INJECTION INTRAVENOUS at 23:00

## 2020-11-10 RX ADMIN — LEVETIRACETAM 1500 MG: 15 INJECTION INTRAVENOUS at 00:36

## 2020-11-10 RX ADMIN — SODIUM CHLORIDE 75 ML/HR: 9 INJECTION, SOLUTION INTRAVENOUS at 15:12

## 2020-11-10 NOTE — PROGRESS NOTES
General Daily Progress Note          Patient Name:   Linda Granados       YOB: 1990       Age:  35 y.o. Admit Date: 11/9/2020      Subjective:     Patient resting the bed not in distress             Objective:     Visit Vitals  /79 (BP 1 Location: Right arm)   Pulse (!) 118   Temp (!) 101.3 °F (38.5 °C)   Resp 22   Ht 5' (1.524 m)   Wt 51.7 kg (114 lb)   SpO2 97%   Breastfeeding No   BMI 22.26 kg/m²        Recent Results (from the past 24 hour(s))   SARS-COV-2    Collection Time: 11/09/20  5:00 PM   Result Value Ref Range    SARS-CoV-2 Please find results under separate order     LACTIC ACID    Collection Time: 11/09/20  8:35 PM   Result Value Ref Range    Lactic acid 0.9 0.4 - 2.0 mmol/L     [unfilled]      Review of Systems    Nonverbal      Physical Exam:      Constitutional not in distress   head: Normocephalic and atraumatic. Eyes: Pupils are equal, round, and reactive to light. EOM are normal.   Cardiovascular: Normal rate, regular rhythm and normal heart sounds. Pulmonary/Chest: Breath sounds normal. No wheezes. No rales. Exhibits no tenderness. Abdominal: Soft. Bowel sounds are normal. There is no abdominal tenderness. There is no rebound and no guarding. Musculoskeletal: Normal range of motion. Neurological: Moves all extremities  XR CHEST PORT   Final Result   IMPRESSION: Underexpanded lungs. No focal infiltrate. No overt edema.            Recent Results (from the past 24 hour(s))   SARS-COV-2    Collection Time: 11/09/20  5:00 PM   Result Value Ref Range    SARS-CoV-2 Please find results under separate order     LACTIC ACID    Collection Time: 11/09/20  8:35 PM   Result Value Ref Range    Lactic acid 0.9 0.4 - 2.0 mmol/L       Results     Procedure Component Value Units Date/Time    CULTURE, BLOOD #1 [[id number]] Collected:  11/09/20 1745    Order Status:  Canceled Specimen:  Blood     CULTURE, BLOOD #2 [[id number]] Collected:  11/09/20 1745    Order Status:  Ольга Barragan Specimen:  Blood     CULTURE, BLOOD, PAIRED [[id number]] Collected:  11/09/20 0900    Order Status:  Completed Specimen:  Blood Updated:  11/10/20 0709     Special Requests: No Special Requests        Culture result: No growth after 19 hours              Labs:     Recent Labs     11/09/20 0915   WBC 14.7*   HGB 13.2   HCT 41.8        Recent Labs     11/09/20 0915   *   K 3.6   *   CO2 15*   BUN 29*   CREA 1.58*   GLU 72   CA 8.7     Recent Labs     11/09/20 0915   ALT 74   AP 57   TBILI 0.5   TP 7.9   ALB 3.5   GLOB 4.4*     No results for input(s): INR, PTP, APTT, INREXT in the last 72 hours. No results for input(s): FE, TIBC, PSAT, FERR in the last 72 hours. No results found for: FOL, RBCF   No results for input(s): PH, PCO2, PO2 in the last 72 hours. No results for input(s): CPK, CKNDX, TROIQ in the last 72 hours.     No lab exists for component: CPKMB  No results found for: CHOL, CHOLX, CHLST, CHOLV, HDL, HDLP, LDL, LDLC, DLDLP, TGLX, TRIGL, TRIGP, CHHD, CHHDX  No results found for: GLUCPOC  Lab Results   Component Value Date/Time    Color Yellow/Straw 11/09/2020 08:30 AM    Appearance Turbid (A) 11/09/2020 08:30 AM    Specific gravity 1.020 11/09/2020 08:30 AM    pH (UA) 5.0 11/09/2020 08:30 AM    Protein 100 (A) 11/09/2020 08:30 AM    Glucose Negative 11/09/2020 08:30 AM    Ketone 80 (A) 11/09/2020 08:30 AM    Bilirubin Negative 11/09/2020 08:30 AM    Urobilinogen 0.1 11/09/2020 08:30 AM    Nitrites Negative 11/09/2020 08:30 AM    Leukocyte Esterase Trace (A) 11/09/2020 08:30 AM    Bacteria Negative 11/09/2020 08:30 AM    Bacteria Rare (A) 11/09/2020 08:30 AM    WBC 5-10 11/09/2020 08:30 AM    WBC 9 (H) 11/09/2020 08:30 AM    RBC  11/09/2020 08:30 AM    RBC 59 (H) 11/09/2020 08:30 AM         Assessment:     Sepsis  Complicated UTI  History of Seizures  Angelman Syndrome  Lactic acidosis  Metabolic acidosis      Plan:     Continue Zosyn/vancomycin  Continue Topamax  Continue Microgestin  Continue Keppra 1500 twice daily    Medication was reviewed with patient's father by the nurse  Follow-up with the neurology  Follow-up the cultures          Current Facility-Administered Medications:     acetaminophen (TYLENOL) tablet 650 mg, 650 mg, Oral, Q4H PRN **OR** acetaminophen (TYLENOL) suppository 650 mg, 650 mg, Rectal, Q4H PRN, Unique Carpio MD, 650 mg at 11/10/20 1039    piperacillin-tazobactam (ZOSYN) 3.375 g in 0.9% sodium chloride (MBP/ADV) 100 mL MBP, 3.375 g, IntraVENous, Q8H, Unique Carpio MD, Last Rate: 25 mL/hr at 11/10/20 1507, 3.375 g at 11/10/20 1507    Vancomycin RX Dosing information, , Other, PRN, Boom Hatfield MD    vancomycin (VANCOCIN) 750 mg in 0.9% sodium chloride 250 mL (VIAL-MATE), 750 mg, IntraVENous, Q24H, Boom Hatfield MD, 750 mg at 11/10/20 1018    [START ON 11/12/2020] Vancomycin trough level to be drawn on 11/12/2020 at 0800 am., , Other, Frantz Oscar MD    norethindrone-ethinyl estradiol (MICROGESTIN 1/20) 1-20 mg-mcg tablet 1 Tab (Patient Supplied), 1 Tab, Oral, DAILY, Arturo Cortés MD    0.9% sodium chloride infusion, 75 mL/hr, IntraVENous, CONTINUOUS, Unique Carpio MD, Last Rate: 75 mL/hr at 11/10/20 1512, 75 mL/hr at 11/10/20 1512    polyethylene glycol (MIRALAX) packet 17 g, 17 g, Oral, DAILY PRN, Donnie Carpio MD    ondansetron (ZOFRAN ODT) tablet 4 mg, 4 mg, Oral, Q8H PRN **OR** ondansetron (ZOFRAN) injection 4 mg, 4 mg, IntraVENous, Q6H PRN, Unique Carpio MD    enoxaparin (LOVENOX) injection 40 mg, 40 mg, SubCUTAneous, DAILY, Unique Carpio MD, 40 mg at 11/10/20 1018    cefTRIAXone (ROCEPHIN) 1 g in 0.9% sodium chloride (MBP/ADV) 50 mL MBP, 1 g, IntraVENous, Q24H, Unique Carpio MD, Last Rate: 100 mL/hr at 11/10/20 1331, 1 g at 11/10/20 1331    [Held by provider] levETIRAcetam (KEPPRA) tablet 1,500 mg, 1,500 mg, Oral, BID, Donnie Carpio MD    topiramate (TOPAMAX) tablet 400 mg, 400 mg, Oral, DAILY WITH BREAKFAST, Princess Cristy Carpio MD    topiramate (TOPAMAX) tablet 200 mg, 200 mg, Oral, QHS, Princess Cristy Carpio MD    levETIRAcetam (KEPPRA) 1500 mg in saline (iso-osm) 100 ml IVPB, 1,500 mg, IntraVENous, Q12H, Unique Carpio MD, 1,500 mg at 11/10/20 1214

## 2020-11-10 NOTE — PROGRESS NOTES
Dr. Lisa Lau notified of increasing temperature. Orders received for addition of IV Vancomycin and Zosyn along with an ID consult. Tylenol will be available q4h PRN instead of q6h.

## 2020-11-10 NOTE — PROGRESS NOTES
notified of needing patient's home meds ordered. Orders received, placed, and reviewed with pharmacy.

## 2020-11-10 NOTE — PROGRESS NOTES
Admission Skin Assessment: Pt. Skin is warm to the touch with no edema noted. Pt. Has no pressure ulcers to note. Pt. However, has soreness and bite marks to  Her inner mouth and tongue due to multiply seizures per day. When pt. Has a seizure she will bite her tongue and inner cheeks.

## 2020-11-10 NOTE — PROGRESS NOTES
Patient is not taking anything PO at this time. Contacted  about switching patient's Keppra back to IV until patient starts taking PO items. Order received.

## 2020-11-10 NOTE — CONSULTS
NEURO CONSULT      REASON FOR ADMISSION:  Seizures, lethargy, nausea vomiting, lethargy Angelman's syndrome      HISTORY:  With a reported history of Angelman syndrome, who is consulted to neurology for seizures and lethargy. Patient also has had nausea and vomiting. She was brought to the ER. In the ER patient had a head CT without contrast that did not show any acute process. She had a reported temperature of 101. Patient was subsequently admitted to the floor to rule out COVID-19 patient reportedly has not had another seizure.           ROS:    General:                     No fever, no chills, no sweats, no generalized weakness, no weight loss/gain,                                       No loss of appetite   Eyes:                           No blurred vision, no eye pain, no loss of vision, no double vision  ENT:                            rhinorrhea, no pharyngitis   Respiratory:               No cough, no sputum production, no SOB, no ROBINS, no wheezing, no pleuritic pain   Cardiology:                No chest pain, no palpitations, no orthopnea, no PND, no edema, no syncope   Gastrointestinal:       No abdominal pain , no N/V, no diarrhea, no dysphagia, no constipation, no bleeding   Genitourinary:           frequency, no urgency, no dysuria, no hematuria, no incontinence   Muskuloskeletal :      No arthralgia, no myalgia, no back pain  Hematology:              No easy bruising, no nose or gum bleeding, no lymphadenopathy   Dermatological:         No rash, no ulceration, no pruritis, no color change / jaundice  Endocrine:                 hot flashes or polydipsia   Neurological:             No headache, no dizziness, no confusion, no focal weakness, no paresthesia,                                      No Speech difficulties, no memory loss, no gait difficulty  Psychological:          No neelings of anxiety, no depression, no agitation      NEURO EXAM:    Mental status: Eyes open, minimal reaction to verbal stimulus. Cranial nerves: Reacts to visual threat    Motor exam: Diffusely weak reflexes depressed    Sensory exam: No reaction to pain    Coordination: Patient cannot perform coordination    Gait and Station: She is nonambulatory.     ASSESSMENT:  Seizure, patient has not seized since she came to the floor  Lethargy this may be a combination of metabolic abnormalities and the recurrent vomiting        PLAN:  Seizure precautions  Continue antiseizure medications  EEG when patient is COVID-19 negative  Prolactin level  Ammonia level      ALLERGIES:    No Known Allergies    MEDS:      Current Facility-Administered Medications:     acetaminophen (TYLENOL) tablet 650 mg, 650 mg, Oral, Q4H PRN **OR** acetaminophen (TYLENOL) suppository 650 mg, 650 mg, Rectal, Q4H PRN, Unique Carpio MD, 650 mg at 11/10/20 0601    piperacillin-tazobactam (ZOSYN) 3.375 g in 0.9% sodium chloride (MBP/ADV) 100 mL MBP, 3.375 g, IntraVENous, Q8H, Unique Carpio MD, Last Rate: 25 mL/hr at 11/10/20 0601, 3.375 g at 11/10/20 0601    0.9% sodium chloride infusion, 75 mL/hr, IntraVENous, CONTINUOUS, Unique Carpio MD, Last Rate: 75 mL/hr at 11/09/20 1926, 75 mL/hr at 11/09/20 1926    polyethylene glycol (MIRALAX) packet 17 g, 17 g, Oral, DAILY PRN, Axel Carpio MD    ondansetron (ZOFRAN ODT) tablet 4 mg, 4 mg, Oral, Q8H PRN **OR** ondansetron (ZOFRAN) injection 4 mg, 4 mg, IntraVENous, Q6H PRN, Unique Carpio MD    enoxaparin (LOVENOX) injection 40 mg, 40 mg, SubCUTAneous, DAILY, Axel Carpio MD    cefTRIAXone (ROCEPHIN) 1 g in 0.9% sodium chloride (MBP/ADV) 50 mL MBP, 1 g, IntraVENous, Q24H, Unique Carpio MD, Stopped at 11/09/20 1328    [Held by provider] levETIRAcetam (KEPPRA) tablet 1,500 mg, 1,500 mg, Oral, BID, Axel Carpio MD    topiramate (TOPAMAX) tablet 400 mg, 400 mg, Oral, DAILY WITH BREAKFAST, Unique Carpio MD    topiramate (TOPAMAX) tablet 200 mg, 200 mg, Oral, QHS, Axel Carpio MD  [facility] levETIRAcetam (KEPPRA) 1500 mg in saline (iso-osm) 100 ml IVPB, 1,500 mg, IntraVENous, Q12H, Unique Carpio MD, 1,500 mg at 11/10/20 0036    LABS:  Recent Results (from the past 24 hour(s))   URINALYSIS W/ RFLX MICROSCOPIC    Collection Time: 11/09/20  8:30 AM   Result Value Ref Range    Color Yellow/Straw      Appearance Turbid (A) Clear      Specific gravity 1.020 1.003 - 1.030      pH (UA) 5.0 5.0 - 8.0      Protein 100 (A) Negative mg/dL    Glucose Negative Negative mg/dL    Ketone 80 (A) Negative mg/dL    Bilirubin Negative Negative      Blood Large (A) Negative      Urobilinogen 0.1 0.1 - 1.0 EU/dL    Nitrites Negative Negative      Leukocyte Esterase Trace (A) Negative      WBC 5-10 0 - 4 /hpf    RBC  0 - 5 /hpf    Bacteria Negative Negative /hpf    Mucus Trace /lpf   URINE MICROSCOPIC    Collection Time: 11/09/20  8:30 AM   Result Value Ref Range    WBC 9 (H) 0 - 4 /hpf    RBC 59 (H) 0 - 5 /hpf    Bacteria Rare (A) Negative /hpf   CULTURE, BLOOD, PAIRED    Collection Time: 11/09/20  9:00 AM    Specimen: Blood   Result Value Ref Range    Special Requests: No Special Requests      Culture result: No growth after 19 hours     LACTIC ACID    Collection Time: 11/09/20  9:15 AM   Result Value Ref Range    Lactic acid 2.5 (HH) 0.4 - 2.0 mmol/L   CBC WITH AUTOMATED DIFF    Collection Time: 11/09/20  9:15 AM   Result Value Ref Range    WBC 14.7 (H) 3.6 - 11.0 K/uL    RBC 4.31 3.80 - 5.20 M/uL    HGB 13.2 11.5 - 16.0 g/dL    HCT 41.8 35.0 - 47.0 %    MCV 97.0 80.0 - 99.0 FL    MCH 30.6 26.0 - 34.0 PG    MCHC 31.6 30.0 - 36.5 g/dL    RDW 13.5 11.5 - 14.5 %    PLATELET 360 976 - 466 K/uL    MPV 11.3 8.9 - 12.9 FL    NEUTROPHILS 88 (H) 32 - 75 %    LYMPHOCYTES 7 (L) 12 - 49 %    MONOCYTES 5 5 - 13 %    EOSINOPHILS 0 0 - 7 %    BASOPHILS 0 0 - 1 %    IMMATURE GRANULOCYTES 0 0.0 - 0.5 %    ABS. NEUTROPHILS 13.0 (H) 1.8 - 8.0 K/UL    ABS. LYMPHOCYTES 1.0 0.8 - 3.5 K/UL    ABS. MONOCYTES 0.7 0.0 - 1.0 K/UL    ABS. EOSINOPHILS 0.0 0.0 - 0.4 K/UL    ABS. BASOPHILS 0.0 0.0 - 0.1 K/UL    ABS. IMM. GRANS. 0.0 0.00 - 0.04 K/UL    DF AUTOMATED     METABOLIC PANEL, COMPREHENSIVE    Collection Time: 11/09/20  9:15 AM   Result Value Ref Range    Sodium 149 (H) 136 - 145 mmol/L    Potassium 3.6 3.5 - 5.1 mmol/L    Chloride 121 (H) 97 - 108 mmol/L    CO2 15 (LL) 21 - 32 mmol/L    Anion gap 13 5 - 15 mmol/L    Glucose 72 65 - 100 mg/dL    BUN 29 (H) 6 - 20 mg/dL    Creatinine 1.58 (H) 0.55 - 1.02 mg/dL    BUN/Creatinine ratio 18 12 - 20      GFR est AA 47 (L) >60 ml/min/1.73m2    GFR est non-AA 39 (L) >60 ml/min/1.73m2    Calcium 8.7 8.5 - 10.1 mg/dL    Bilirubin, total 0.5 0.2 - 1.0 mg/dL    AST (SGOT) 147 (H) 15 - 37 U/L    ALT (SGPT) 74 12 - 78 U/L    Alk. phosphatase 57 45 - 117 U/L    Protein, total 7.9 6.4 - 8.2 g/dL    Albumin 3.5 3.5 - 5.0 g/dL    Globulin 4.4 (H) 2.0 - 4.0 g/dL    A-G Ratio 0.8 (L) 1.1 - 2.2     SARS-COV-2    Collection Time: 11/09/20  5:00 PM   Result Value Ref Range    SARS-CoV-2 Please find results under separate order     LACTIC ACID    Collection Time: 11/09/20  8:35 PM   Result Value Ref Range    Lactic acid 0.9 0.4 - 2.0 mmol/L       Visit Vitals  /78   Pulse 94   Temp (!) 103.1 °F (39.5 °C)   Resp 20   Ht 5' (1.524 m)   Wt 51.7 kg (114 lb)   SpO2 97%   Breastfeeding No   BMI 22.26 kg/m²       Imaging:  XR CHEST PORT   Final Result   IMPRESSION: Underexpanded lungs. No focal infiltrate. No overt edema.

## 2020-11-11 ENCOUNTER — APPOINTMENT (OUTPATIENT)
Dept: GENERAL RADIOLOGY | Age: 30
DRG: 720 | End: 2020-11-11
Attending: FAMILY MEDICINE
Payer: MEDICAID

## 2020-11-11 LAB
ALBUMIN SERPL-MCNC: 2.8 G/DL (ref 3.5–5)
ALBUMIN/GLOB SERPL: 0.8 {RATIO} (ref 1.1–2.2)
ALP SERPL-CCNC: 35 U/L (ref 45–117)
ALT SERPL-CCNC: 56 U/L (ref 12–78)
ANION GAP SERPL CALC-SCNC: 12 MMOL/L (ref 5–15)
APPEARANCE UR: CLEAR
AST SERPL W P-5'-P-CCNC: 193 U/L (ref 15–37)
BACTERIA URNS QL MICRO: NEGATIVE /HPF
BASOPHILS # BLD: 0 K/UL (ref 0–0.1)
BASOPHILS NFR BLD: 0 % (ref 0–1)
BILIRUB SERPL-MCNC: 0.5 MG/DL (ref 0.2–1)
BILIRUB UR QL: NEGATIVE
BUN SERPL-MCNC: 23 MG/DL (ref 6–20)
BUN/CREAT SERPL: 17 (ref 12–20)
CA-I BLD-MCNC: 8.4 MG/DL (ref 8.5–10.1)
CHLORIDE SERPL-SCNC: 128 MMOL/L (ref 97–108)
CO2 SERPL-SCNC: 20 MMOL/L (ref 21–32)
COLOR UR: ABNORMAL
CREAT SERPL-MCNC: 1.39 MG/DL (ref 0.55–1.02)
CREAT UR-MCNC: 189 MG/DL
CREAT UR-MCNC: 190 MG/DL
DIFFERENTIAL METHOD BLD: ABNORMAL
EOSINOPHIL # BLD: 0 K/UL (ref 0–0.4)
EOSINOPHIL NFR BLD: 0 % (ref 0–7)
ERYTHROCYTE [DISTWIDTH] IN BLOOD BY AUTOMATED COUNT: 14.4 % (ref 11.5–14.5)
GLOBULIN SER CALC-MCNC: 3.5 G/DL (ref 2–4)
GLUCOSE BLD STRIP.AUTO-MCNC: 60 MG/DL (ref 65–100)
GLUCOSE BLD STRIP.AUTO-MCNC: 78 MG/DL (ref 65–100)
GLUCOSE BLD STRIP.AUTO-MCNC: 84 MG/DL (ref 65–100)
GLUCOSE SERPL-MCNC: 69 MG/DL (ref 65–100)
GLUCOSE UR STRIP.AUTO-MCNC: NEGATIVE MG/DL
HCT VFR BLD AUTO: 38.7 % (ref 35–47)
HGB BLD-MCNC: 11.7 G/DL (ref 11.5–16)
HGB UR QL STRIP: ABNORMAL
IMM GRANULOCYTES # BLD AUTO: 0 K/UL (ref 0–0.04)
IMM GRANULOCYTES NFR BLD AUTO: 0 % (ref 0–0.5)
KETONES UR QL STRIP.AUTO: 80 MG/DL
LEUKOCYTE ESTERASE UR QL STRIP.AUTO: ABNORMAL
LYMPHOCYTES # BLD: 1.6 K/UL (ref 0.8–3.5)
LYMPHOCYTES NFR BLD: 13 % (ref 12–49)
MCH RBC QN AUTO: 30 PG (ref 26–34)
MCHC RBC AUTO-ENTMCNC: 30.2 G/DL (ref 30–36.5)
MCV RBC AUTO: 99.2 FL (ref 80–99)
MONOCYTES # BLD: 1.2 K/UL (ref 0–1)
MONOCYTES NFR BLD: 10 % (ref 5–13)
MRSA DNA SPEC QL NAA+PROBE: NOT DETECTED
NEUTS SEG # BLD: 9.1 K/UL (ref 1.8–8)
NEUTS SEG NFR BLD: 77 % (ref 32–75)
NITRITE UR QL STRIP.AUTO: NEGATIVE
PERFORMED BY, TECHID: ABNORMAL
PERFORMED BY, TECHID: NORMAL
PERFORMED BY, TECHID: NORMAL
PH UR STRIP: 5 [PH] (ref 5–8)
PLATELET # BLD AUTO: 181 K/UL (ref 150–400)
PMV BLD AUTO: 11.1 FL (ref 8.9–12.9)
POTASSIUM SERPL-SCNC: 3.3 MMOL/L (ref 3.5–5.1)
POTASSIUM UR-SCNC: 44 MMOL/L
PROT SERPL-MCNC: 6.3 G/DL (ref 6.4–8.2)
PROT UR STRIP-MCNC: 30 MG/DL
PROT UR-MCNC: 83 MG/DL (ref 0–11.9)
PROT UR-MCNC: 84 MG/DL (ref 0–11.9)
PROT/CREAT UR-RTO: 0.4
RBC # BLD AUTO: 3.9 M/UL (ref 3.8–5.2)
RBC #/AREA URNS HPF: ABNORMAL /HPF (ref 0–5)
SODIUM SERPL-SCNC: 160 MMOL/L (ref 136–145)
SODIUM UR-SCNC: 66 MMOL/L
SP GR UR REFRACTOMETRY: 1.03 (ref 1–1.03)
UROBILINOGEN UR QL STRIP.AUTO: 0.1 EU/DL (ref 0.1–1)
WBC # BLD AUTO: 11.9 K/UL (ref 3.6–11)
WBC URNS QL MICRO: ABNORMAL /HPF (ref 0–4)

## 2020-11-11 PROCEDURE — 82570 ASSAY OF URINE CREATININE: CPT

## 2020-11-11 PROCEDURE — 84156 ASSAY OF PROTEIN URINE: CPT

## 2020-11-11 PROCEDURE — 84133 ASSAY OF URINE POTASSIUM: CPT

## 2020-11-11 PROCEDURE — 81001 URINALYSIS AUTO W/SCOPE: CPT

## 2020-11-11 PROCEDURE — 87086 URINE CULTURE/COLONY COUNT: CPT

## 2020-11-11 PROCEDURE — 74011000258 HC RX REV CODE- 258: Performed by: PSYCHIATRY & NEUROLOGY

## 2020-11-11 PROCEDURE — 74011250636 HC RX REV CODE- 250/636: Performed by: PSYCHIATRY & NEUROLOGY

## 2020-11-11 PROCEDURE — 74011250636 HC RX REV CODE- 250/636: Performed by: INTERNAL MEDICINE

## 2020-11-11 PROCEDURE — 74011250636 HC RX REV CODE- 250/636: Performed by: FAMILY MEDICINE

## 2020-11-11 PROCEDURE — 74011250637 HC RX REV CODE- 250/637: Performed by: FAMILY MEDICINE

## 2020-11-11 PROCEDURE — 65610000006 HC RM INTENSIVE CARE

## 2020-11-11 PROCEDURE — 74011000258 HC RX REV CODE- 258: Performed by: FAMILY MEDICINE

## 2020-11-11 PROCEDURE — 82962 GLUCOSE BLOOD TEST: CPT

## 2020-11-11 PROCEDURE — 85025 COMPLETE CBC W/AUTO DIFF WBC: CPT

## 2020-11-11 PROCEDURE — 87641 MR-STAPH DNA AMP PROBE: CPT

## 2020-11-11 PROCEDURE — C9254 INJECTION, LACOSAMIDE: HCPCS | Performed by: PSYCHIATRY & NEUROLOGY

## 2020-11-11 PROCEDURE — 80053 COMPREHEN METABOLIC PANEL: CPT

## 2020-11-11 PROCEDURE — 99221 1ST HOSP IP/OBS SF/LOW 40: CPT | Performed by: INTERNAL MEDICINE

## 2020-11-11 PROCEDURE — 83935 ASSAY OF URINE OSMOLALITY: CPT

## 2020-11-11 PROCEDURE — 84300 ASSAY OF URINE SODIUM: CPT

## 2020-11-11 PROCEDURE — 71045 X-RAY EXAM CHEST 1 VIEW: CPT

## 2020-11-11 PROCEDURE — 0DH67UZ INSERTION OF FEEDING DEVICE INTO STOMACH, VIA NATURAL OR ARTIFICIAL OPENING: ICD-10-PCS | Performed by: FAMILY MEDICINE

## 2020-11-11 RX ORDER — POTASSIUM CHLORIDE AND SODIUM CHLORIDE 450; 150 MG/100ML; MG/100ML
INJECTION, SOLUTION INTRAVENOUS CONTINUOUS
Status: DISCONTINUED | OUTPATIENT
Start: 2020-11-11 | End: 2020-11-12

## 2020-11-11 RX ORDER — DEXTROSE MONOHYDRATE 50 MG/ML
50 INJECTION, SOLUTION INTRAVENOUS CONTINUOUS
Status: DISCONTINUED | OUTPATIENT
Start: 2020-11-11 | End: 2020-11-11

## 2020-11-11 RX ADMIN — LORAZEPAM 1 MG: 2 INJECTION, SOLUTION INTRAMUSCULAR; INTRAVENOUS at 02:09

## 2020-11-11 RX ADMIN — PIPERACILLIN AND TAZOBACTAM 3.38 G: 3; .375 INJECTION, POWDER, LYOPHILIZED, FOR SOLUTION INTRAVENOUS at 00:24

## 2020-11-11 RX ADMIN — POTASSIUM CHLORIDE AND SODIUM CHLORIDE: 450; 150 INJECTION, SOLUTION INTRAVENOUS at 18:10

## 2020-11-11 RX ADMIN — SODIUM CHLORIDE 50 MG: 0.9 INJECTION INTRAVENOUS at 10:39

## 2020-11-11 RX ADMIN — DEXTROSE MONOHYDRATE 50 ML/HR: 50 INJECTION, SOLUTION INTRAVENOUS at 00:24

## 2020-11-11 RX ADMIN — LORAZEPAM 1 MG: 2 INJECTION, SOLUTION INTRAMUSCULAR; INTRAVENOUS at 04:15

## 2020-11-11 RX ADMIN — LORAZEPAM 1 MG: 2 INJECTION, SOLUTION INTRAMUSCULAR; INTRAVENOUS at 06:52

## 2020-11-11 RX ADMIN — ACETAMINOPHEN 650 MG: 650 SUPPOSITORY RECTAL at 02:09

## 2020-11-11 RX ADMIN — PIPERACILLIN AND TAZOBACTAM 3.38 G: 3; .375 INJECTION, POWDER, LYOPHILIZED, FOR SOLUTION INTRAVENOUS at 16:45

## 2020-11-11 RX ADMIN — LORAZEPAM 1 MG: 2 INJECTION, SOLUTION INTRAMUSCULAR; INTRAVENOUS at 10:10

## 2020-11-11 RX ADMIN — LEVETIRACETAM 1500 MG: 15 INJECTION INTRAVENOUS at 22:07

## 2020-11-11 RX ADMIN — ACETAMINOPHEN 650 MG: 650 SUPPOSITORY RECTAL at 12:54

## 2020-11-11 RX ADMIN — PIPERACILLIN AND TAZOBACTAM 3.38 G: 3; .375 INJECTION, POWDER, LYOPHILIZED, FOR SOLUTION INTRAVENOUS at 22:07

## 2020-11-11 RX ADMIN — DEXTROSE MONOHYDRATE 50 ML/HR: 50 INJECTION, SOLUTION INTRAVENOUS at 10:32

## 2020-11-11 RX ADMIN — LORAZEPAM 1 MG: 2 INJECTION, SOLUTION INTRAMUSCULAR; INTRAVENOUS at 19:46

## 2020-11-11 RX ADMIN — LEVETIRACETAM 1500 MG: 15 INJECTION INTRAVENOUS at 11:27

## 2020-11-11 RX ADMIN — VANCOMYCIN HYDROCHLORIDE 750 MG: 750 INJECTION, POWDER, LYOPHILIZED, FOR SOLUTION INTRAVENOUS at 12:52

## 2020-11-11 RX ADMIN — PIPERACILLIN AND TAZOBACTAM 3.38 G: 3; .375 INJECTION, POWDER, LYOPHILIZED, FOR SOLUTION INTRAVENOUS at 09:15

## 2020-11-11 RX ADMIN — ENOXAPARIN SODIUM 40 MG: 40 INJECTION SUBCUTANEOUS at 09:15

## 2020-11-11 RX ADMIN — LORAZEPAM 1 MG: 2 INJECTION, SOLUTION INTRAMUSCULAR; INTRAVENOUS at 14:50

## 2020-11-11 RX ADMIN — SODIUM CHLORIDE 50 MG: 0.9 INJECTION INTRAVENOUS at 22:07

## 2020-11-11 RX ADMIN — TOPIRAMATE 200 MG: 100 TABLET, FILM COATED ORAL at 22:07

## 2020-11-11 NOTE — PROGRESS NOTES
Comprehensive Nutrition Assessment    Type and Reason for Visit: Initial(TF)    Nutrition Recommendations/Plan:   D/c regular diet, order NPO  As NG placed and available for use    Initiate TF of Osmolite 1.2 at 30 ml/hr      Advance by 20 ml q4hr to goal rate of 53ml/hr, continuous,      Flush with 100 ml water x 5 times/day      Providing 1526 kcal, 71 g pro, 1543 ml fluids (98%kcal, 114% pro, 99% fluid)    Document TF rate, water flushes, and GRVs in EMR    Nutrition Assessment:  Presented to ED with seizures, vomiting, and fever. Admitted for UTI. Noted pt father reported 4 episodes of black emesis over the night pta; ?possible GI bleed. CXR indicated under expanded lungs, no overt edema (11/11). Pt transferred to ICU early morning 11/11. Noted pt lethargic likely d/t  recurrent seizures. Noted D5 initiated 11/11 at 50ml/hr d/t low BG. Pt continues to have seizures; neuro following. RN reports pt with poor appetite consuming nothing. Discussed possibility of NG tube placement and initiating TF as pt stable. Noted MD ordered NG placement and TF s/p discussion with RN. DI to provide updated TF recs. Labs: Na 160, K 3.3, Cl 128, CO2 20, BUN 23, Cr 1.39, , Alk Phos 35, ALT WNL. Meds: D5, IVF, Lovenox, lacosamide, keppra, ativan, microgestin, zosyn, topamax, vancomycin. Malnutrition Assessment:  Malnutrition Status:  No malnutrition    Context:  Acute illness       Estimated Daily Nutrient Needs:  Energy (kcal): 1550kcal (30kcal/kg); Weight Used for Energy Requirements: Current  Protein (g): 62g (1.2g/kg); Weight Used for Protein Requirements: Current  Fluid (ml/day): 1550ml; Method Used for Fluid Requirements: 1 ml/kcal  Needs based on seizures and wt maintenance    Nutrition Related Findings:  NFPE not performed 2/2 precautions. Pt appeared nourished upon visual assessment. No N/V/C/D per RN. Last BM 11/11. Noted n/v/d pta. Discussed NG placement with RN. No hx of dysphagia document. No edema. Wounds:    None       Current Nutrition Therapies:  DIET REGULAR  DIET TUBE FEEDING Jevity 1.5    · Goal TF & Flush Orders Provides: Rec'd Osmolite 1.2 at goal 53ml/hr continuous, flush with 100 ml water x 5 times/day; Providing 1526kcal, 71g pro, 1543 ml fluid       Anthropometric Measures:  · Height:  5' (152.4 cm)  · Current Body Wt:  51.7 kg (113 lb 15.7 oz)(11/09)   · Admission Body Wt:  113 lb 15.7 oz    · Usual Body Wt:  (BEATRIZ)     · Ideal Body Wt:  100 lbs:  114 %   · BMI Category:  Normal weight (BMI 18.5-24. 9)     Wt hx: BEATRIZ    Nutrition Diagnosis:   · Inadequate protein-energy intake related to cognitive or neurological impairment as evidenced by poor intake prior to admission, intake 0-25%(AMS, seizures)      Nutrition Interventions:   Food and/or Nutrient Delivery: Start tube feeding, Modify current diet  Nutrition Education and Counseling: No recommendations at this time  Coordination of Nutrition Care: Continue to monitor while inpatient    Goals:  Intake > 75% EENs > 7 days, wt maintenance +/- 0.5kg/wk, Lytes WNL, GI status       Nutrition Monitoring and Evaluation:   Behavioral-Environmental Outcomes: None identified  Food/Nutrient Intake Outcomes: Enteral nutrition intake/tolerance  Physical Signs/Symptoms Outcomes: Nausea/vomiting, Weight, Biochemical data    Discharge Planning:     Too soon to determine     Electronically signed by Darian Davis on 11/11/2020 at 11:58 AM    Contact:

## 2020-11-11 NOTE — PROGRESS NOTES
Dr. Ada Youngblood notified of lab results. Sodium 160 and Glucose 49. Order received for D5W @ 50 ml/hr.

## 2020-11-11 NOTE — CONSULTS
[address] Renal Consult Note      NAME:  Le Perez   :   1990   MRN:  [id number]     Requesting Physician Yossi Lubin MD   Reason for Consult:  hypernatremia     PCP:  Viky Dawn MD     Date/Time:  2020 5:12 PM          Subjective:     CHIEF COMPLAINT: seizure     HISTORY OF PRESENT ILLNESS:     Ms. Jessie Thomas is a 35 y.o.  white female who is admitted to the medical Service with [address] We are asked to evaluate for hypernatremia and azotemia. Patient unable to give a history. .      Per ER physician evaluation on presentation:  Sumanth Fragoso, 35 y.o. female with a past medical history significant Angelman syndrome presents to the ED with cc of fever, vomiting, seizure. Patient's father presents history, states overnight he noted patient to be febrile to 104. Noted some vomitus near mouth. Patient nonverbal at baseline bedbound, has frequent seizures, according to father approximately 30-40 a day. Patient actively seizing when EMS arrived, was given Versed 2.5 mg, with cessation of seizure. On presentation patient had another seizure. Was given Ativan 2 mg, and Keppra 1 g IV. Father states patient takes 1500 mg of Keppra twice a day, has not taken in 24 hours. No note of any coughing, no obvious shortness of breath, patient has been eating less over the last several days. \"    We are called for hypernatremia associated with hyperchloremia and azotemia. Past Medical History:   Diagnosis Date    Angelman's syndrome     Seizure ([facility] [address]     Seizures ([facility] [address]         No past surgical history on file. Social History     Tobacco Use    Smoking status: Never Smoker    Smokeless tobacco: Never Used   Substance Use Topics    Alcohol use: Never     Frequency: Never        No family history on file. No Known Allergies     Prior to Admission medications    Medication Sig Start Date End Date Taking?  Authorizing Provider   topiramate (Topamax) 200 mg tablet Take 200 mg by mouth daily. At night. Yes Provider, Historical   topiramate (Topamax) 100 mg tablet Take 400 mg by mouth daily. In the morning. Yes Provider, Historical   levETIRAcetam (Keppra) 500 mg tablet Take 1,500 mg by mouth two (2) times a day.    Yes Provider, Historical         Current Facility-Administered Medications:     dextrose 5% infusion, 50 mL/hr, IntraVENous, CONTINUOUS, Unique Carpio MD, Last Rate: 50 mL/hr at 11/11/20 1032, 50 mL/hr at 11/11/20 1032    lacosamide (VIMPAT) 50 mg in 0.9% sodium chloride 100 mL IVPB, 50 mg, IntraVENous, BID, Deborah Tony MD, 50 mg at 11/11/20 1039    sodium chloride 0.9 % bolus infusion 500 mL, 500 mL, IntraVENous, ONCE, Unique Carpio MD    0.45% sodium chloride 1,000 mL with potassium chloride 30 mEq infusion, , IntraVENous, CONTINUOUS, Kasey Poon MD    acetaminophen (TYLENOL) tablet 650 mg, 650 mg, Oral, Q4H PRN **OR** acetaminophen (TYLENOL) suppository 650 mg, 650 mg, Rectal, Q4H PRN, Unique Carpio MD, 650 mg at 11/11/20 1254    piperacillin-tazobactam (ZOSYN) 3.375 g in 0.9% sodium chloride (MBP/ADV) 100 mL MBP, 3.375 g, IntraVENous, Q8H, Unique Carpio MD, Last Rate: 25 mL/hr at 11/11/20 1645, 3.375 g at 11/11/20 1645    Vancomycin RX Dosing information, , Other, PRN, Martínez Love MD    vancomycin (VANCOCIN) 750 mg in 0.9% sodium chloride 250 mL (VIAL-MATE), 750 mg, IntraVENous, Q24H, Martínez Love MD, 750 mg at 11/11/20 1252    [START ON 11/12/2020] Vancomycin trough level to be drawn on 11/12/2020 at 0800 am., , Other, Karime Ramos MD    norethindrone-ethinyl estradiol (MICROGESTIN 1/20) 1-20 mg-mcg tablet 1 Tab (Patient Supplied), 1 Tab, Oral, DAILY, Unique Carpio MD, Stopped at 11/10/20 1400    LORazepam (ATIVAN) injection 1 mg, 1 mg, IntraVENous, Q2H PRN, Unique Carpio MD, 1 mg at 11/11/20 1450    polyethylene glycol (MIRALAX) packet 17 g, 17 g, Oral, DAILY PRN, Eric Carpio MD    ondansetron (ZOFRAN ODT) tablet 4 mg, 4 mg, Oral, Q8H PRN **OR** ondansetron (ZOFRAN) injection 4 mg, 4 mg, IntraVENous, Q6H PRN, Unique Carpio MD    enoxaparin (LOVENOX) injection 40 mg, 40 mg, SubCUTAneous, DAILY, Unique Carpio MD, 40 mg at 11/11/20 0915    [Held by provider] levETIRAcetam (KEPPRA) tablet 1,500 mg, 1,500 mg, Oral, BID, Unique Carpio MD, Stopped at 11/10/20 2100    topiramate (TOPAMAX) tablet 400 mg, 400 mg, Oral, DAILY WITH BREAKFAST, Unique Carpio MD, Stopped at 11/10/20 0800    topiramate (TOPAMAX) tablet 200 mg, 200 mg, Oral, QHS, Unique Carpio MD, Stopped at 11/10/20 2100    levETIRAcetam (KEPPRA) 1500 mg in saline (iso-osm) 100 ml IVPB, 1,500 mg, IntraVENous, Q12H, Unique Carpio MD, 1,500 mg at 11/11/20 1127      Review of Systems:  Review of systems not obtained due to patient factors.        Objective:      VITALS:    Vital signs reviewed; most recent are:    Visit Vitals  BP (!) 103/59   Pulse (!) 127   Temp (!) 100.6 °F (38.1 °C)   Resp 26   Ht 5' (1.524 m)   Wt 51.7 kg (114 lb)   SpO2 95%   Breastfeeding No   BMI 22.26 kg/m²     SpO2 Readings from Last 6 Encounters:   11/11/20 95%    O2 Flow Rate (L/min): 2 l/min       Intake/Output Summary (Last 24 hours) at 11/11/2020 1712  Last data filed at 11/11/2020 0600  Gross per 24 hour   Intake 280 ml   Output    Net 280 ml            Exam:   Physical Exam:  General appearance: no distress, appears stated age, unresponsive  Head: Normocephalic, without obvious abnormality, atraumatic  Eyes: negative findings: anicteric sclera  Neck: supple, symmetrical, trachea midline, no adenopathy, thyroid: not enlarged, symmetric, no tenderness/mass/nodules, no carotid bruit and no JVD  Lungs: clear to auscultation bilaterally  Heart: regular rate and rhythm, no S3 or S4  Abdomen: normal findings: no scars, striae, dilated veins, rashes, or lesions, no masses palpable, no bruits heard, soft, non-tender  Extremities: extremities normal, atraumatic, no cyanosis or edema  Pulses: 2+ and symmetric  Skin: Skin color, texture, turgor normal. No rashes or lesions  Neurologic: Mental status: alertness: comatose  Motor: no spontaneous movements      LABS:  Recent Labs     11/11/20  0645 11/10/20  2245 11/09/20  0915   * 160* 149*   K 3.3* 3.4* 3.6   * 127* 121*   CO2 20* 17* 15*   BUN 23* 25* 29*   CREA 1.39* 1.38* 1.58*   CA 8.4* 9.0 8.7   ALB 2.8*  --  3.5     Recent Labs     11/11/20  0645 11/10/20  2245 11/09/20  0915   WBC 11.9* 12.4* 14.7*   HGB 11.7 12.3 13.2   HCT 38.7 39.5 41.8    214 234     Lab Results   Component Value Date/Time    Glucose (POC) 78 11/11/2020 07:51 AM    Glucose (POC) 60 (L) 11/11/2020 04:05 AM     No results for input(s): PH, PCO2, PO2, HCO3, FIO2 in the last 72 hours. No results for input(s): INR, INREXT in the last 72 hours. No results for input(s): WALLACE, KU, CLU, CREAU in the last 72 hours. No lab exists for component: PROU    Assessment:   Renal Specific Problems  Hypernatremia with hyperchloremia  ? Volume depletion (however, has been on normal saline for at least 2 liters)  Hypokalemia  Metabolic acidosis    Patient likely volume depleted and had been receiving D5W. She needs hypotonic solution but also solute replacement until euvolemic then change to D5W. Will write for 3 liters 0.45NS with K which supplies equivalent of 1.5 liters of free water. An NGT would be helpful but per RN the family would like to avoid an NGT.     Plan:     Obtain/ Order: labs/cultures/radiology/procedures:  urine lytes and urine creatinine and spot urine protein and creatinine ratio and renal panel and mag        Therapeutic:    Change IVF to 0.45NS with KCL  Oral KCL    Risk of deterioration: not known      Total time spent with patient: 30 Minutes                  Discussed:  ICU RN           ___________________________________________________    Attending Physician: Elena Browning MD

## 2020-11-11 NOTE — PROGRESS NOTES
Dr. Derik Castro notified again of elevated VS with temp of 103 Axillary and Tylenol cannot be given again for 2 more hours, patient is tachycardic at 130 and continues to have frequent seizure activity despite administering PRN Ativan. Blood sugar also resulted at 60 after being on D5 for 4 hours. Order received to transfer patient to ICU.

## 2020-11-11 NOTE — PROGRESS NOTES
Patient having constant seizures lasting between 5-10 seconds and one as long as 45 seconds long. Patient have been given Ativan last at 0600 this morning and multiple times during the night. Spoke with Dr. Mindy Paz, new order for Vimpat given. Will continue to monitor.

## 2020-11-11 NOTE — PROGRESS NOTES
General Daily Progress Note          Patient Name:   Malik Tucker       YOB: 1990       Age:  35 y.o. Admit Date: 11/9/2020      Subjective:       Patient transferred to ICU patient had of continuous seizures  Patient also had fever of 101.3 yesterday    Sodium is 160             Objective:     Visit Vitals  BP (!) 103/59   Pulse (!) 127   Temp 98.8 °F (37.1 °C)   Resp 26   Ht 5' (1.524 m)   Wt 51.7 kg (114 lb)   SpO2 95%   Breastfeeding No   BMI 22.26 kg/m²        Recent Results (from the past 24 hour(s))   CBC WITH AUTOMATED DIFF    Collection Time: 11/10/20 10:45 PM   Result Value Ref Range    WBC 12.4 (H) 3.6 - 11.0 K/uL    RBC 4.08 3.80 - 5.20 M/uL    HGB 12.3 11.5 - 16.0 g/dL    HCT 39.5 35.0 - 47.0 %    MCV 96.8 80.0 - 99.0 FL    MCH 30.1 26.0 - 34.0 PG    MCHC 31.1 30.0 - 36.5 g/dL    RDW 14.1 11.5 - 14.5 %    PLATELET 625 842 - 359 K/uL    MPV 10.9 8.9 - 12.9 FL    NEUTROPHILS 90 (H) 32 - 75 %    LYMPHOCYTES 4 (L) 12 - 49 %    MONOCYTES 6 5 - 13 %    EOSINOPHILS 0 0 - 7 %    BASOPHILS 0 0 - 1 %    IMMATURE GRANULOCYTES 0 0.0 - 0.5 %    ABS. NEUTROPHILS 11.1 (H) 1.8 - 8.0 K/UL    ABS. LYMPHOCYTES 0.5 (L) 0.8 - 3.5 K/UL    ABS. MONOCYTES 0.8 0.0 - 1.0 K/UL    ABS. EOSINOPHILS 0.0 0.0 - 0.4 K/UL    ABS. BASOPHILS 0.0 0.0 - 0.1 K/UL    ABS. IMM.  GRANS. 0.0 0.00 - 0.04 K/UL    DF AUTOMATED     METABOLIC PANEL, BASIC    Collection Time: 11/10/20 10:45 PM   Result Value Ref Range    Sodium 160 (H) 136 - 145 mmol/L    Potassium 3.4 (L) 3.5 - 5.1 mmol/L    Chloride 127 (H) 97 - 108 mmol/L    CO2 17 (L) 21 - 32 mmol/L    Anion gap 16 (H) 5 - 15 mmol/L    Glucose 49 (LL) 65 - 100 mg/dL    BUN 25 (H) 6 - 20 mg/dL    Creatinine 1.38 (H) 0.55 - 1.02 mg/dL    BUN/Creatinine ratio 18 12 - 20      GFR est AA 55 (L) >60 ml/min/1.73m2    GFR est non-AA 45 (L) >60 ml/min/1.73m2    Calcium 9.0 8.5 - 10.1 mg/dL   GLUCOSE, POC    Collection Time: 11/11/20  4:05 AM   Result Value Ref Range    Glucose (POC) 60 (L) 65 - 100 mg/dL    Performed by Willem Gonzalez    MRSA SCREEN - PCR (NASAL)    Collection Time: 11/11/20  5:30 AM   Result Value Ref Range    MRSA by PCR, Nasal Not Detected Not Detected     CBC WITH AUTOMATED DIFF    Collection Time: 11/11/20  6:45 AM   Result Value Ref Range    WBC 11.9 (H) 3.6 - 11.0 K/uL    RBC 3.90 3.80 - 5.20 M/uL    HGB 11.7 11.5 - 16.0 g/dL    HCT 38.7 35.0 - 47.0 %    MCV 99.2 (H) 80.0 - 99.0 FL    MCH 30.0 26.0 - 34.0 PG    MCHC 30.2 30.0 - 36.5 g/dL    RDW 14.4 11.5 - 14.5 %    PLATELET 688 203 - 364 K/uL    MPV 11.1 8.9 - 12.9 FL    NEUTROPHILS 77 (H) 32 - 75 %    LYMPHOCYTES 13 12 - 49 %    MONOCYTES 10 5 - 13 %    EOSINOPHILS 0 0 - 7 %    BASOPHILS 0 0 - 1 %    IMMATURE GRANULOCYTES 0 0.0 - 0.5 %    ABS. NEUTROPHILS 9.1 (H) 1.8 - 8.0 K/UL    ABS. LYMPHOCYTES 1.6 0.8 - 3.5 K/UL    ABS. MONOCYTES 1.2 (H) 0.0 - 1.0 K/UL    ABS. EOSINOPHILS 0.0 0.0 - 0.4 K/UL    ABS. BASOPHILS 0.0 0.0 - 0.1 K/UL    ABS. IMM. GRANS. 0.0 0.00 - 0.04 K/UL    DF AUTOMATED     METABOLIC PANEL, COMPREHENSIVE    Collection Time: 11/11/20  6:45 AM   Result Value Ref Range    Sodium 160 (H) 136 - 145 mmol/L    Potassium 3.3 (L) 3.5 - 5.1 mmol/L    Chloride 128 (H) 97 - 108 mmol/L    CO2 20 (L) 21 - 32 mmol/L    Anion gap 12 5 - 15 mmol/L    Glucose 69 65 - 100 mg/dL    BUN 23 (H) 6 - 20 mg/dL    Creatinine 1.39 (H) 0.55 - 1.02 mg/dL    BUN/Creatinine ratio 17 12 - 20      GFR est AA 54 (L) >60 ml/min/1.73m2    GFR est non-AA 45 (L) >60 ml/min/1.73m2    Calcium 8.4 (L) 8.5 - 10.1 mg/dL    Bilirubin, total 0.5 0.2 - 1.0 mg/dL    AST (SGOT) 193 (H) 15 - 37 U/L    ALT (SGPT) 56 12 - 78 U/L    Alk.  phosphatase 35 (L) 45 - 117 U/L    Protein, total 6.3 (L) 6.4 - 8.2 g/dL    Albumin 2.8 (L) 3.5 - 5.0 g/dL    Globulin 3.5 2.0 - 4.0 g/dL    A-G Ratio 0.8 (L) 1.1 - 2.2     GLUCOSE, POC    Collection Time: 11/11/20  7:51 AM   Result Value Ref Range    Glucose (POC) 78 65 - 100 mg/dL    Performed by Gama Bowens [unfilled]      Review of Systems    Nonverbal      Physical Exam:      Constitutional not in distress   head: Normocephalic and atraumatic. Eyes: Pupils are equal, round, and reactive to light. EOM are normal.   Cardiovascular: Normal rate, regular rhythm and normal heart sounds. Pulmonary/Chest: Breath sounds normal. No wheezes. No rales. Exhibits no tenderness. Abdominal: Soft. Bowel sounds are normal. There is no abdominal tenderness. There is no rebound and no guarding. Musculoskeletal: Normal range of motion. Neurological: Moves all extremities  XR CHEST PORT   Final Result   IMPRESSION: Underexpanded lungs. No focal infiltrate. No overt edema. Recent Results (from the past 24 hour(s))   CBC WITH AUTOMATED DIFF    Collection Time: 11/10/20 10:45 PM   Result Value Ref Range    WBC 12.4 (H) 3.6 - 11.0 K/uL    RBC 4.08 3.80 - 5.20 M/uL    HGB 12.3 11.5 - 16.0 g/dL    HCT 39.5 35.0 - 47.0 %    MCV 96.8 80.0 - 99.0 FL    MCH 30.1 26.0 - 34.0 PG    MCHC 31.1 30.0 - 36.5 g/dL    RDW 14.1 11.5 - 14.5 %    PLATELET 235 911 - 406 K/uL    MPV 10.9 8.9 - 12.9 FL    NEUTROPHILS 90 (H) 32 - 75 %    LYMPHOCYTES 4 (L) 12 - 49 %    MONOCYTES 6 5 - 13 %    EOSINOPHILS 0 0 - 7 %    BASOPHILS 0 0 - 1 %    IMMATURE GRANULOCYTES 0 0.0 - 0.5 %    ABS. NEUTROPHILS 11.1 (H) 1.8 - 8.0 K/UL    ABS. LYMPHOCYTES 0.5 (L) 0.8 - 3.5 K/UL    ABS. MONOCYTES 0.8 0.0 - 1.0 K/UL    ABS. EOSINOPHILS 0.0 0.0 - 0.4 K/UL    ABS. BASOPHILS 0.0 0.0 - 0.1 K/UL    ABS. IMM.  GRANS. 0.0 0.00 - 0.04 K/UL    DF AUTOMATED     METABOLIC PANEL, BASIC    Collection Time: 11/10/20 10:45 PM   Result Value Ref Range    Sodium 160 (H) 136 - 145 mmol/L    Potassium 3.4 (L) 3.5 - 5.1 mmol/L    Chloride 127 (H) 97 - 108 mmol/L    CO2 17 (L) 21 - 32 mmol/L    Anion gap 16 (H) 5 - 15 mmol/L    Glucose 49 (LL) 65 - 100 mg/dL    BUN 25 (H) 6 - 20 mg/dL    Creatinine 1.38 (H) 0.55 - 1.02 mg/dL    BUN/Creatinine ratio 18 12 - 20      GFR est AA 55 (L) >60 ml/min/1.73m2    GFR est non-AA 45 (L) >60 ml/min/1.73m2    Calcium 9.0 8.5 - 10.1 mg/dL   GLUCOSE, POC    Collection Time: 11/11/20  4:05 AM   Result Value Ref Range    Glucose (POC) 60 (L) 65 - 100 mg/dL    Performed by VIRGEN URRUTIA    MRSA SCREEN - PCR (NASAL)    Collection Time: 11/11/20  5:30 AM   Result Value Ref Range    MRSA by PCR, Nasal Not Detected Not Detected     CBC WITH AUTOMATED DIFF    Collection Time: 11/11/20  6:45 AM   Result Value Ref Range    WBC 11.9 (H) 3.6 - 11.0 K/uL    RBC 3.90 3.80 - 5.20 M/uL    HGB 11.7 11.5 - 16.0 g/dL    HCT 38.7 35.0 - 47.0 %    MCV 99.2 (H) 80.0 - 99.0 FL    MCH 30.0 26.0 - 34.0 PG    MCHC 30.2 30.0 - 36.5 g/dL    RDW 14.4 11.5 - 14.5 %    PLATELET 375 082 - 531 K/uL    MPV 11.1 8.9 - 12.9 FL    NEUTROPHILS 77 (H) 32 - 75 %    LYMPHOCYTES 13 12 - 49 %    MONOCYTES 10 5 - 13 %    EOSINOPHILS 0 0 - 7 %    BASOPHILS 0 0 - 1 %    IMMATURE GRANULOCYTES 0 0.0 - 0.5 %    ABS. NEUTROPHILS 9.1 (H) 1.8 - 8.0 K/UL    ABS. LYMPHOCYTES 1.6 0.8 - 3.5 K/UL    ABS. MONOCYTES 1.2 (H) 0.0 - 1.0 K/UL    ABS. EOSINOPHILS 0.0 0.0 - 0.4 K/UL    ABS. BASOPHILS 0.0 0.0 - 0.1 K/UL    ABS. IMM. GRANS. 0.0 0.00 - 0.04 K/UL    DF AUTOMATED     METABOLIC PANEL, COMPREHENSIVE    Collection Time: 11/11/20  6:45 AM   Result Value Ref Range    Sodium 160 (H) 136 - 145 mmol/L    Potassium 3.3 (L) 3.5 - 5.1 mmol/L    Chloride 128 (H) 97 - 108 mmol/L    CO2 20 (L) 21 - 32 mmol/L    Anion gap 12 5 - 15 mmol/L    Glucose 69 65 - 100 mg/dL    BUN 23 (H) 6 - 20 mg/dL    Creatinine 1.39 (H) 0.55 - 1.02 mg/dL    BUN/Creatinine ratio 17 12 - 20      GFR est AA 54 (L) >60 ml/min/1.73m2    GFR est non-AA 45 (L) >60 ml/min/1.73m2    Calcium 8.4 (L) 8.5 - 10.1 mg/dL    Bilirubin, total 0.5 0.2 - 1.0 mg/dL    AST (SGOT) 193 (H) 15 - 37 U/L    ALT (SGPT) 56 12 - 78 U/L    Alk.  phosphatase 35 (L) 45 - 117 U/L    Protein, total 6.3 (L) 6.4 - 8.2 g/dL    Albumin 2.8 (L) 3.5 - 5.0 g/dL    Globulin 3.5 2.0 - 4.0 g/dL    A-G Ratio 0.8 (L) 1.1 - 2.2     GLUCOSE, POC    Collection Time: 11/11/20  7:51 AM   Result Value Ref Range    Glucose (POC) 78 65 - 100 mg/dL    Performed by Sunday Santamaria        Results     Procedure Component Value Units Date/Time    MRSA SCREEN - PCR (NASAL) [[id number]] Collected:  11/11/20 0530    Order Status:  Completed Specimen:  Swab Updated:  11/11/20 0831     MRSA by PCR, Nasal Not Detected       CULTURE, BLOOD #1 [[id number]] Collected:  11/09/20 1745    Order Status:  Canceled Specimen:  Blood     CULTURE, BLOOD #2 [[id number]] Collected:  11/09/20 1745    Order Status:  Canceled Specimen:  Blood     CULTURE, BLOOD, PAIRED [[id number]] Collected:  11/09/20 0900    Order Status:  Completed Specimen:  Blood Updated:  11/10/20 0709     Special Requests: No Special Requests        Culture result: No growth after 19 hours              Labs:     Recent Labs     11/11/20  0645 11/10/20  2245   WBC 11.9* 12.4*   HGB 11.7 12.3   HCT 38.7 39.5    214     Recent Labs     11/11/20  0645 11/10/20  2245 11/09/20 0915   * 160* 149*   K 3.3* 3.4* 3.6   * 127* 121*   CO2 20* 17* 15*   BUN 23* 25* 29*   CREA 1.39* 1.38* 1.58*   GLU 69 49* 72   CA 8.4* 9.0 8.7     Recent Labs     11/11/20 0645 11/09/20  0915   ALT 56 74   AP 35* 57   TBILI 0.5 0.5   TP 6.3* 7.9   ALB 2.8* 3.5   GLOB 3.5 4.4*     No results for input(s): INR, PTP, APTT, INREXT, INREXT in the last 72 hours. No results for input(s): FE, TIBC, PSAT, FERR in the last 72 hours. No results found for: FOL, RBCF   No results for input(s): PH, PCO2, PO2 in the last 72 hours. No results for input(s): CPK, CKNDX, TROIQ in the last 72 hours.     No lab exists for component: CPKMB  No results found for: CHOL, CHOLX, CHLST, CHOLV, HDL, HDLP, LDL, LDLC, DLDLP, TGLX, TRIGL, TRIGP, CHHD, CHHDX  Lab Results   Component Value Date/Time    Glucose (POC) 78 11/11/2020 07:51 AM    Glucose (POC) 60 (L) 11/11/2020 04:05 AM     Lab Results Component Value Date/Time    Color Yellow/Straw 11/09/2020 08:30 AM    Appearance Turbid (A) 11/09/2020 08:30 AM    Specific gravity 1.020 11/09/2020 08:30 AM    pH (UA) 5.0 11/09/2020 08:30 AM    Protein 100 (A) 11/09/2020 08:30 AM    Glucose Negative 11/09/2020 08:30 AM    Ketone 80 (A) 11/09/2020 08:30 AM    Bilirubin Negative 11/09/2020 08:30 AM    Urobilinogen 0.1 11/09/2020 08:30 AM    Nitrites Negative 11/09/2020 08:30 AM    Leukocyte Esterase Trace (A) 11/09/2020 08:30 AM    Bacteria Negative 11/09/2020 08:30 AM    Bacteria Rare (A) 11/09/2020 08:30 AM    WBC 5-10 11/09/2020 08:30 AM    WBC 9 (H) 11/09/2020 08:30 AM    RBC  11/09/2020 08:30 AM    RBC 59 (H) 11/09/2020 08:30 AM         Assessment:   Recurrent seizures  Sepsis  Complicated UTI  History of Seizures  Angelman Syndrome  Lactic acidosis  Metabolic acidosis  Hypernatremia      Plan:     Continue Zosyn/vancomycin  Continue Topamax  Continue Microgestin  Continue Keppra 1500 twice daily  Seen by the neurology started on Vimpat    Will to nephrology consult for hypernatremia    Medication was reviewed with patient's father by the nurse  Follow-up with the neurology  Follow-up the cultures    Discussed with the patient's sister Ele Stockton  Her phone number is [phone]          Current Facility-Administered Medications:     dextrose 5% infusion, 50 mL/hr, IntraVENous, CONTINUOUS, Unique Carpio MD, Last Rate: 50 mL/hr at 11/11/20 0024, 50 mL/hr at 11/11/20 0024    lacosamide (VIMPAT) 50 mg in 0.9% sodium chloride 100 mL IVPB, 50 mg, IntraVENous, BID, Pam Palma MD    sodium chloride 0.9 % bolus infusion 500 mL, 500 mL, IntraVENous, ONCE, David Carpio MD    acetaminophen (TYLENOL) tablet 650 mg, 650 mg, Oral, Q4H PRN **OR** acetaminophen (TYLENOL) suppository 650 mg, 650 mg, Rectal, Q4H PRN, Unique Carpio MD, 650 mg at 11/11/20 0209    piperacillin-tazobactam (ZOSYN) 3.375 g in 0.9% sodium chloride (MBP/ADV) 100 mL MBP, 3.375 g, IntraVENous, Q8H, Unique Carpio MD, Last Rate: 25 mL/hr at 11/11/20 0915, 3.375 g at 11/11/20 0915    Vancomycin RX Dosing information, , Other, PRN, Alice Jimenez MD    vancomycin (VANCOCIN) 750 mg in 0.9% sodium chloride 250 mL (VIAL-MATE), 750 mg, IntraVENous, Q24H, Alice Jimenez MD, 750 mg at 11/10/20 1018    [START ON 11/12/2020] Vancomycin trough level to be drawn on 11/12/2020 at 0800 am., , Other, Richar Sparrow MD    norethindrone-ethinyl estradiol (MICROGESTIN 1/20) 1-20 mg-mcg tablet 1 Tab (Patient Supplied), 1 Tab, Oral, DAILY, David Carpio MD, Stopped at 11/10/20 1400    LORazepam (ATIVAN) injection 1 mg, 1 mg, IntraVENous, Q2H PRN, Unique Carpio MD, 1 mg at 11/11/20 1010    polyethylene glycol (MIRALAX) packet 17 g, 17 g, Oral, DAILY PRN, David Carpio MD    ondansetron (ZOFRAN ODT) tablet 4 mg, 4 mg, Oral, Q8H PRN **OR** ondansetron (ZOFRAN) injection 4 mg, 4 mg, IntraVENous, Q6H PRN, Unique Carpio MD    enoxaparin (LOVENOX) injection 40 mg, 40 mg, SubCUTAneous, DAILY, Uniqeu Carpoi MD, 40 mg at 11/11/20 0915    [Held by provider] levETIRAcetam (KEPPRA) tablet 1,500 mg, 1,500 mg, Oral, BID, Unique Carpio MD, Stopped at 11/10/20 2100    topiramate (TOPAMAX) tablet 400 mg, 400 mg, Oral, DAILY WITH BREAKFAST, Unique Carpio MD, Stopped at 11/10/20 0800    topiramate (TOPAMAX) tablet 200 mg, 200 mg, Oral, QHS, Unique Carpio MD, Stopped at 11/10/20 2100    levETIRAcetam (KEPPRA) 1500 mg in saline (iso-osm) 100 ml IVPB, 1,500 mg, IntraVENous, Q12H, Unique Carpio MD, 1,500 mg at 11/10/20 2300

## 2020-11-11 NOTE — CONSULTS
Consult Date: 11/11/2020    Consults: UTI and fever    Subjective      This is a 35 year female with developmental disability and seizure disorder, brought to the hospital because of fever to 104, vomiting and seizure activity. She had low grade temperature on arrival with WBC 14,700 and elevated lactic acid. Urinalysis showed pyuria and hematuria but no bacteria. CXR showed underexpanded lungs with no focal infiltrates. Blood culture was sent and patient was started on Vancomycin and Zosyn. Unable to located urine culture. ID has been consulted for this reason. Patient admitted to the ICU. Past Medical History:   Diagnosis Date    Angelman's syndrome     Seizure ([facility] [address]     Seizures ([facility] [address]       No past surgical history on file. No family history on file.    Social History     Tobacco Use    Smoking status: Never Smoker    Smokeless tobacco: Never Used   Substance Use Topics    Alcohol use: Never     Frequency: Never       Current Facility-Administered Medications   Medication Dose Route Frequency Provider Last Rate Last Dose    dextrose 5% infusion  50 mL/hr IntraVENous CONTINUOUS Unique Carpio MD 50 mL/hr at 11/11/20 1032 50 mL/hr at 11/11/20 1032    lacosamide (VIMPAT) 50 mg in 0.9% sodium chloride 100 mL IVPB  50 mg IntraVENous BID Daryle Headland, MD   50 mg at 11/11/20 1039    sodium chloride 0.9 % bolus infusion 500 mL  500 mL IntraVENous ONCE Flores Carpio MD        acetaminophen (TYLENOL) tablet 650 mg  650 mg Oral Q4H PRN Flores Carpio MD        Or   [address] [place] [address] acetaminophen (TYLENOL) suppository 650 mg  650 mg Rectal Q4H PRN Unique Carpio MD   650 mg at 11/11/20 1254    piperacillin-tazobactam (ZOSYN) 3.375 g in 0.9% sodium chloride (MBP/ADV) 100 mL MBP  3.375 g IntraVENous Q8H Unique Carpio MD 25 mL/hr at 11/11/20 0915 3.375 g at 11/11/20 0915    Vancomycin RX Dosing information   Other PRN Kristina Ulrich MD        vancomycin (VANCOCIN) 750 mg in 0.9% sodium chloride 250 mL (VIAL-MATE)  750 mg IntraVENous Q24H Nithya Ocampo MD   750 mg at 11/11/20 1252    [START ON 11/12/2020] Vancomycin trough level to be drawn on 11/12/2020 at 0800 am.   Other Karely Mclaughlin MD        norethindrone-ethinyl estradiol (MICROGESTIN 1/20) 1-20 mg-mcg tablet 1 Tab (Patient Supplied)  1 Tab Oral DAILY Yossi Lubin MD   Stopped at 11/10/20 1400    LORazepam (ATIVAN) injection 1 mg  1 mg IntraVENous Q2H PRN Axel Carpio MD   1 mg at 11/11/20 1450    polyethylene glycol (MIRALAX) packet 17 g  17 g Oral DAILY PRN Axel Carpio MD        ondansetron (ZOFRAN ODT) tablet 4 mg  4 mg Oral Q8H PRN Axel Carpio MD        Or    ondansetron [facility]) injection 4 mg  4 mg IntraVENous Q6H PRN Axel Carpoi MD        enoxaparin (LOVENOX) injection 40 mg  40 mg SubCUTAneous DAILY Unique Carpio MD   40 mg at 11/11/20 0915    [Held by provider] levETIRAcetam (KEPPRA) tablet 1,500 mg  1,500 mg Oral BID Yossi Lubin MD   Stopped at 11/10/20 2100    topiramate (TOPAMAX) tablet 400 mg  400 mg Oral DAILY WITH BREAKFAST Yossi Lubin MD   Stopped at 11/10/20 0800    topiramate (TOPAMAX) tablet 200 mg  200 mg Oral QHS Unique Carpio MD   Stopped at 11/10/20 2100    levETIRAcetam (KEPPRA) 1500 mg in saline (iso-osm) 100 ml IVPB  1,500 mg IntraVENous Q12H Unique Carpio MD   1,500 mg at 11/11/20 1127        Review of Systems   Unable to perform ROS: Acuity of condition       Objective     Vital signs for last 24 hours:  Visit Vitals  BP (!) 103/59   Pulse (!) 127   Temp (!) 103.5 °F (39.7 °C)   Resp 26   Ht 5' (1.524 m)   Wt 114 lb (51.7 kg)   SpO2 95%   Breastfeeding No   BMI 22.26 kg/m²       Intake/Output this shift:  Current Shift: No intake/output data recorded.   Last 3 Shifts: 11/09 1901 - 11/11 0700  In: 280 [I.V.:280]  Out: -     Data Review:   Recent Results (from the past 24 hour(s))   CBC WITH AUTOMATED DIFF    Collection Time: 11/10/20 10:45 PM   Result Value Ref Range    WBC 12.4 (H) 3.6 - 11.0 K/uL    RBC 4.08 3.80 - 5.20 M/uL    HGB 12.3 11.5 - 16.0 g/dL    HCT 39.5 35.0 - 47.0 %    MCV 96.8 80.0 - 99.0 FL    MCH 30.1 26.0 - 34.0 PG    MCHC 31.1 30.0 - 36.5 g/dL    RDW 14.1 11.5 - 14.5 %    PLATELET 469 453 - 134 K/uL    MPV 10.9 8.9 - 12.9 FL    NEUTROPHILS 90 (H) 32 - 75 %    LYMPHOCYTES 4 (L) 12 - 49 %    MONOCYTES 6 5 - 13 %    EOSINOPHILS 0 0 - 7 %    BASOPHILS 0 0 - 1 %    IMMATURE GRANULOCYTES 0 0.0 - 0.5 %    ABS. NEUTROPHILS 11.1 (H) 1.8 - 8.0 K/UL    ABS. LYMPHOCYTES 0.5 (L) 0.8 - 3.5 K/UL    ABS. MONOCYTES 0.8 0.0 - 1.0 K/UL    ABS. EOSINOPHILS 0.0 0.0 - 0.4 K/UL    ABS. BASOPHILS 0.0 0.0 - 0.1 K/UL    ABS. IMM.  GRANS. 0.0 0.00 - 0.04 K/UL    DF AUTOMATED     METABOLIC PANEL, BASIC    Collection Time: 11/10/20 10:45 PM   Result Value Ref Range    Sodium 160 (H) 136 - 145 mmol/L    Potassium 3.4 (L) 3.5 - 5.1 mmol/L    Chloride 127 (H) 97 - 108 mmol/L    CO2 17 (L) 21 - 32 mmol/L    Anion gap 16 (H) 5 - 15 mmol/L    Glucose 49 (LL) 65 - 100 mg/dL    BUN 25 (H) 6 - 20 mg/dL    Creatinine 1.38 (H) 0.55 - 1.02 mg/dL    BUN/Creatinine ratio 18 12 - 20      GFR est AA 55 (L) >60 ml/min/1.73m2    GFR est non-AA 45 (L) >60 ml/min/1.73m2    Calcium 9.0 8.5 - 10.1 mg/dL   GLUCOSE, POC    Collection Time: 11/11/20  4:05 AM   Result Value Ref Range    Glucose (POC) 60 (L) 65 - 100 mg/dL    Performed by VIRGEN GHADA    MRSA SCREEN - PCR (NASAL)    Collection Time: 11/11/20  5:30 AM   Result Value Ref Range    MRSA by PCR, Nasal Not Detected Not Detected     CBC WITH AUTOMATED DIFF    Collection Time: 11/11/20  6:45 AM   Result Value Ref Range    WBC 11.9 (H) 3.6 - 11.0 K/uL    RBC 3.90 3.80 - 5.20 M/uL    HGB 11.7 11.5 - 16.0 g/dL    HCT 38.7 35.0 - 47.0 %    MCV 99.2 (H) 80.0 - 99.0 FL    MCH 30.0 26.0 - 34.0 PG    MCHC 30.2 30.0 - 36.5 g/dL    RDW 14.4 11.5 - 14.5 %    PLATELET 527 396 - 522 K/uL    MPV 11.1 8.9 - 12.9 FL    NEUTROPHILS 77 (H) 32 - 75 % LYMPHOCYTES 13 12 - 49 %    MONOCYTES 10 5 - 13 %    EOSINOPHILS 0 0 - 7 %    BASOPHILS 0 0 - 1 %    IMMATURE GRANULOCYTES 0 0.0 - 0.5 %    ABS. NEUTROPHILS 9.1 (H) 1.8 - 8.0 K/UL    ABS. LYMPHOCYTES 1.6 0.8 - 3.5 K/UL    ABS. MONOCYTES 1.2 (H) 0.0 - 1.0 K/UL    ABS. EOSINOPHILS 0.0 0.0 - 0.4 K/UL    ABS. BASOPHILS 0.0 0.0 - 0.1 K/UL    ABS. IMM. GRANS. 0.0 0.00 - 0.04 K/UL    DF AUTOMATED     METABOLIC PANEL, COMPREHENSIVE    Collection Time: 11/11/20  6:45 AM   Result Value Ref Range    Sodium 160 (H) 136 - 145 mmol/L    Potassium 3.3 (L) 3.5 - 5.1 mmol/L    Chloride 128 (H) 97 - 108 mmol/L    CO2 20 (L) 21 - 32 mmol/L    Anion gap 12 5 - 15 mmol/L    Glucose 69 65 - 100 mg/dL    BUN 23 (H) 6 - 20 mg/dL    Creatinine 1.39 (H) 0.55 - 1.02 mg/dL    BUN/Creatinine ratio 17 12 - 20      GFR est AA 54 (L) >60 ml/min/1.73m2    GFR est non-AA 45 (L) >60 ml/min/1.73m2    Calcium 8.4 (L) 8.5 - 10.1 mg/dL    Bilirubin, total 0.5 0.2 - 1.0 mg/dL    AST (SGOT) 193 (H) 15 - 37 U/L    ALT (SGPT) 56 12 - 78 U/L    Alk. phosphatase 35 (L) 45 - 117 U/L    Protein, total 6.3 (L) 6.4 - 8.2 g/dL    Albumin 2.8 (L) 3.5 - 5.0 g/dL    Globulin 3.5 2.0 - 4.0 g/dL    A-G Ratio 0.8 (L) 1.1 - 2.2     GLUCOSE, POC    Collection Time: 11/11/20  7:51 AM   Result Value Ref Range    Glucose (POC) 78 65 - 100 mg/dL    Performed by Isa Underwood        Physical Exam  Constitutional:       General: She is not in acute distress. Appearance: She is ill-appearing. She is not diaphoretic. HENT:      Head: Normocephalic and atraumatic. Nose: Nose normal.      Mouth/Throat:      Pharynx: Oropharynx is clear. Eyes:      Pupils: Pupils are equal, round, and reactive to light. Neck:      Musculoskeletal: Neck supple. Cardiovascular:      Rate and Rhythm: Regular rhythm. Tachycardia present. Heart sounds: No murmur. Pulmonary:      Breath sounds: Normal breath sounds. Abdominal:      Palpations: Abdomen is soft.       Tenderness: There is no abdominal tenderness. Genitourinary:     Comments: Irvin catheter  Musculoskeletal:      Right lower leg: No edema. Left lower leg: No edema. Skin:     Findings: No erythema or rash. Neurological:      Comments: Unable to assess    No seizure activity   Psychiatric:      Comments: Unable to assess       ASSESSMENT/PLAN    1. Sepsis with fever, elevated lactic acid and leukocytosis. 2. Possible UTI with  pyuria  3. At risk for aspiration pneumonia  4. Seizure disorder, active    1. Continue Vancomycin and Zosyn  2. Urine culture  3. Follow-up blood cultures  4. Repeat CXR in am  5. In am, repeat CBC, check CRP and procalcitonin    Juan Martinez MD

## 2020-11-11 NOTE — PROGRESS NOTES
Father concerned about the frequency of patient's seizures stating it is much more than usual. Father requesting some medication to aid the seizures in addition to the IV Keppra. Dr. Marivel Otero notified and order was received for a one time dose of IV Ativan 1mg and to notify  as well. Order placed and Dr. Chuck Stokes called, message left. Attempted to contact  again-no answer.  notified of patient's continued seizure activity and inability to reach . Order received for 1mg Ativan IV q2h PRN for seizure activity. Continuous pulse ox already in place per floor protocol.

## 2020-11-11 NOTE — PROGRESS NOTES
Patient transferred to room 284 in ICU. BSR given to Sony Salazar RN and MARLA Gallo RN. Patient's father Enedelia was notified of patient's transfer and given an update on her condition. Patient's vagal nerve stimulator activator was transferred with the patient as well and shown to Sony Salazar RN at bedside.

## 2020-11-11 NOTE — PROGRESS NOTES
NEURO PROGRESS NOTE        SUBJECTIVE:     Seizures    EXAM:  Eyes open,  No seizures reported this morning  No new focality      ASSESSMENT/PLAN:  Continue antiseizure medications  ALLERGIES:    No Known Allergies    MEDS:      Current Facility-Administered Medications:     dextrose 5% infusion, 50 mL/hr, IntraVENous, CONTINUOUS, Unique Carpio MD, Last Rate: 50 mL/hr at 11/11/20 0024, 50 mL/hr at 11/11/20 0024    acetaminophen (TYLENOL) tablet 650 mg, 650 mg, Oral, Q4H PRN **OR** acetaminophen (TYLENOL) suppository 650 mg, 650 mg, Rectal, Q4H PRN, Unique Carpio MD, 650 mg at 11/11/20 0209    piperacillin-tazobactam (ZOSYN) 3.375 g in 0.9% sodium chloride (MBP/ADV) 100 mL MBP, 3.375 g, IntraVENous, Q8H, Unique Carpio MD, Last Rate: 25 mL/hr at 11/11/20 0024, 3.375 g at 11/11/20 0024    Vancomycin RX Dosing information, , Other, PRN, Tana Bentley MD    vancomycin (VANCOCIN) 750 mg in 0.9% sodium chloride 250 mL (VIAL-MATE), 750 mg, IntraVENous, Q24H, Tana Bentley MD, 750 mg at 11/10/20 1018    [START ON 11/12/2020] Vancomycin trough level to be drawn on 11/12/2020 at 0800 am., , Other, Mary Jo Pringle MD    norethindrone-ethinyl estradiol (MICROGESTIN 1/20) 1-20 mg-mcg tablet 1 Tab (Patient Supplied), 1 Tab, Oral, DAILY, Adama Mederos MD, Stopped at 11/10/20 1400    LORazepam (ATIVAN) injection 1 mg, 1 mg, IntraVENous, Q2H PRN, Adama Mederos MD, 1 mg at 11/11/20 9014    polyethylene glycol (MIRALAX) packet 17 g, 17 g, Oral, DAILY PRN, Cyndy Carpio MD    ondansetron (ZOFRAN ODT) tablet 4 mg, 4 mg, Oral, Q8H PRN **OR** ondansetron (ZOFRAN) injection 4 mg, 4 mg, IntraVENous, Q6H PRN, Unique Carpio MD    enoxaparin (LOVENOX) injection 40 mg, 40 mg, SubCUTAneous, DAILY, Unique Carpio MD, 40 mg at 11/10/20 1018    [Held by provider] levETIRAcetam (KEPPRA) tablet 1,500 mg, 1,500 mg, Oral, BID, Cyndy Carpio MD, Stopped at 11/10/20 2100    topiramate (TOPAMAX) tablet 400 mg, 400 mg, Oral, DAILY WITH BREAKFAST, Kayleigh Carpio MD, Stopped at 11/10/20 0800    topiramate (TOPAMAX) tablet 200 mg, 200 mg, Oral, QHS, Unique Carpio MD, Stopped at 11/10/20 2100    levETIRAcetam (KEPPRA) 1500 mg in saline (iso-osm) 100 ml IVPB, 1,500 mg, IntraVENous, Q12H, Unique Carpio MD, 1,500 mg at 11/10/20 2300    LABS:  Recent Results (from the past 24 hour(s))   CBC WITH AUTOMATED DIFF    Collection Time: 11/10/20 10:45 PM   Result Value Ref Range    WBC 12.4 (H) 3.6 - 11.0 K/uL    RBC 4.08 3.80 - 5.20 M/uL    HGB 12.3 11.5 - 16.0 g/dL    HCT 39.5 35.0 - 47.0 %    MCV 96.8 80.0 - 99.0 FL    MCH 30.1 26.0 - 34.0 PG    MCHC 31.1 30.0 - 36.5 g/dL    RDW 14.1 11.5 - 14.5 %    PLATELET 627 273 - 271 K/uL    MPV 10.9 8.9 - 12.9 FL    NEUTROPHILS 90 (H) 32 - 75 %    LYMPHOCYTES 4 (L) 12 - 49 %    MONOCYTES 6 5 - 13 %    EOSINOPHILS 0 0 - 7 %    BASOPHILS 0 0 - 1 %    IMMATURE GRANULOCYTES 0 0.0 - 0.5 %    ABS. NEUTROPHILS 11.1 (H) 1.8 - 8.0 K/UL    ABS. LYMPHOCYTES 0.5 (L) 0.8 - 3.5 K/UL    ABS. MONOCYTES 0.8 0.0 - 1.0 K/UL    ABS. EOSINOPHILS 0.0 0.0 - 0.4 K/UL    ABS. BASOPHILS 0.0 0.0 - 0.1 K/UL    ABS. IMM.  GRANS. 0.0 0.00 - 0.04 K/UL    DF AUTOMATED     METABOLIC PANEL, BASIC    Collection Time: 11/10/20 10:45 PM   Result Value Ref Range    Sodium 160 (H) 136 - 145 mmol/L    Potassium 3.4 (L) 3.5 - 5.1 mmol/L    Chloride 127 (H) 97 - 108 mmol/L    CO2 17 (L) 21 - 32 mmol/L    Anion gap 16 (H) 5 - 15 mmol/L    Glucose 49 (LL) 65 - 100 mg/dL    BUN 25 (H) 6 - 20 mg/dL    Creatinine 1.38 (H) 0.55 - 1.02 mg/dL    BUN/Creatinine ratio 18 12 - 20      GFR est AA 55 (L) >60 ml/min/1.73m2    GFR est non-AA 45 (L) >60 ml/min/1.73m2    Calcium 9.0 8.5 - 10.1 mg/dL   GLUCOSE, POC    Collection Time: 11/11/20  4:05 AM   Result Value Ref Range    Glucose (POC) 60 (L) 65 - 100 mg/dL    Performed by VIRGEN CISSE WITH AUTOMATED DIFF    Collection Time: 11/11/20  6:45 AM   Result Value Ref Range    WBC 11.9 (H) 3.6 - 11.0 K/uL    RBC 3.90 3.80 - 5.20 M/uL    HGB 11.7 11.5 - 16.0 g/dL    HCT 38.7 35.0 - 47.0 %    MCV 99.2 (H) 80.0 - 99.0 FL    MCH 30.0 26.0 - 34.0 PG    MCHC 30.2 30.0 - 36.5 g/dL    RDW 14.4 11.5 - 14.5 %    PLATELET 925 056 - 203 K/uL    MPV 11.1 8.9 - 12.9 FL    NEUTROPHILS 77 (H) 32 - 75 %    LYMPHOCYTES 13 12 - 49 %    MONOCYTES 10 5 - 13 %    EOSINOPHILS 0 0 - 7 %    BASOPHILS 0 0 - 1 %    IMMATURE GRANULOCYTES 0 0.0 - 0.5 %    ABS. NEUTROPHILS 9.1 (H) 1.8 - 8.0 K/UL    ABS. LYMPHOCYTES 1.6 0.8 - 3.5 K/UL    ABS. MONOCYTES 1.2 (H) 0.0 - 1.0 K/UL    ABS. EOSINOPHILS 0.0 0.0 - 0.4 K/UL    ABS. BASOPHILS 0.0 0.0 - 0.1 K/UL    ABS. IMM. GRANS. 0.0 0.00 - 0.04 K/UL    DF AUTOMATED     METABOLIC PANEL, COMPREHENSIVE    Collection Time: 11/11/20  6:45 AM   Result Value Ref Range    Sodium 160 (H) 136 - 145 mmol/L    Potassium 3.3 (L) 3.5 - 5.1 mmol/L    Chloride 128 (H) 97 - 108 mmol/L    CO2 20 (L) 21 - 32 mmol/L    Anion gap 12 5 - 15 mmol/L    Glucose 69 65 - 100 mg/dL    BUN 23 (H) 6 - 20 mg/dL    Creatinine 1.39 (H) 0.55 - 1.02 mg/dL    BUN/Creatinine ratio 17 12 - 20      GFR est AA 54 (L) >60 ml/min/1.73m2    GFR est non-AA 45 (L) >60 ml/min/1.73m2    Calcium 8.4 (L) 8.5 - 10.1 mg/dL    Bilirubin, total 0.5 0.2 - 1.0 mg/dL    AST (SGOT) 193 (H) 15 - 37 U/L    ALT (SGPT) 56 12 - 78 U/L    Alk. phosphatase 35 (L) 45 - 117 U/L    Protein, total 6.3 (L) 6.4 - 8.2 g/dL    Albumin 2.8 (L) 3.5 - 5.0 g/dL    Globulin 3.5 2.0 - 4.0 g/dL    A-G Ratio 0.8 (L) 1.1 - 2.2     GLUCOSE, POC    Collection Time: 11/11/20  7:51 AM   Result Value Ref Range    Glucose (POC) 78 65 - 100 mg/dL    Performed by Elysia BOYER        Visit Vitals  BP (!) 103/59   Pulse 71   Temp 98.8 °F (37.1 °C)   Resp 26   Ht 5' (1.524 m)   Wt 51.7 kg (114 lb)   SpO2 98%   Breastfeeding No   BMI 22.26 kg/m²       Imaging:  XR CHEST PORT   Final Result   IMPRESSION: Underexpanded lungs. No focal infiltrate.  No overt edema.       Seizures

## 2020-11-12 ENCOUNTER — APPOINTMENT (OUTPATIENT)
Dept: GENERAL RADIOLOGY | Age: 30
DRG: 720 | End: 2020-11-12
Attending: INTERNAL MEDICINE
Payer: MEDICAID

## 2020-11-12 LAB
ALBUMIN SERPL-MCNC: 2.6 G/DL (ref 3.5–5)
ALBUMIN/GLOB SERPL: 0.8 {RATIO} (ref 1.1–2.2)
ALP SERPL-CCNC: 36 U/L (ref 45–117)
ALT SERPL-CCNC: 63 U/L (ref 12–78)
ANION GAP SERPL CALC-SCNC: 4 MMOL/L (ref 5–15)
AST SERPL W P-5'-P-CCNC: 240 U/L (ref 15–37)
BASOPHILS # BLD: 0 K/UL (ref 0–0.1)
BASOPHILS NFR BLD: 0 % (ref 0–1)
BILIRUB SERPL-MCNC: 0.5 MG/DL (ref 0.2–1)
BUN SERPL-MCNC: 19 MG/DL (ref 6–20)
BUN/CREAT SERPL: 21 (ref 12–20)
CA-I BLD-MCNC: 8.8 MG/DL (ref 8.5–10.1)
CHLORIDE SERPL-SCNC: 123 MMOL/L (ref 97–108)
CO2 SERPL-SCNC: 26 MMOL/L (ref 21–32)
CREAT SERPL-MCNC: 0.89 MG/DL (ref 0.55–1.02)
CRP SERPL-MCNC: 3.73 MG/DL (ref 0–0.6)
DATE LAST DOSE: ABNORMAL
DIFFERENTIAL METHOD BLD: ABNORMAL
EOSINOPHIL # BLD: 0 K/UL (ref 0–0.4)
EOSINOPHIL NFR BLD: 0 % (ref 0–7)
ERYTHROCYTE [DISTWIDTH] IN BLOOD BY AUTOMATED COUNT: 14.2 % (ref 11.5–14.5)
GLOBULIN SER CALC-MCNC: 3.4 G/DL (ref 2–4)
GLUCOSE SERPL-MCNC: 118 MG/DL (ref 65–100)
HCT VFR BLD AUTO: 35.7 % (ref 35–47)
HGB BLD-MCNC: 11.1 G/DL (ref 11.5–16)
IMM GRANULOCYTES # BLD AUTO: 0 K/UL (ref 0–0.04)
IMM GRANULOCYTES NFR BLD AUTO: 0 % (ref 0–0.5)
LYMPHOCYTES # BLD: 2.3 K/UL (ref 0.8–3.5)
LYMPHOCYTES NFR BLD: 26 % (ref 12–49)
MAGNESIUM SERPL-MCNC: 2.2 MG/DL (ref 1.6–2.4)
MCH RBC QN AUTO: 30 PG (ref 26–34)
MCHC RBC AUTO-ENTMCNC: 31.1 G/DL (ref 30–36.5)
MCV RBC AUTO: 96.5 FL (ref 80–99)
MONOCYTES # BLD: 0.3 K/UL (ref 0–1)
MONOCYTES NFR BLD: 3 % (ref 5–13)
NEUTS SEG # BLD: 6.2 K/UL (ref 1.8–8)
NEUTS SEG NFR BLD: 71 % (ref 32–75)
PLATELET # BLD AUTO: 188 K/UL (ref 150–400)
PMV BLD AUTO: 10.8 FL (ref 8.9–12.9)
POTASSIUM SERPL-SCNC: 3.2 MMOL/L (ref 3.5–5.1)
PROCALCITONIN SERPL-MCNC: 1.04 NG/ML
PROT SERPL-MCNC: 6 G/DL (ref 6.4–8.2)
RBC # BLD AUTO: 3.7 M/UL (ref 3.8–5.2)
REPORTED DOSE,DOSE: ABNORMAL UNITS
SARS-COV-2, COV2NT: NOT DETECTED
SODIUM SERPL-SCNC: 153 MMOL/L (ref 136–145)
VANCOMYCIN TROUGH SERPL-MCNC: 2.7 UG/ML (ref 5–10)
WBC # BLD AUTO: 8.8 K/UL (ref 3.6–11)

## 2020-11-12 PROCEDURE — 74011000258 HC RX REV CODE- 258: Performed by: PSYCHIATRY & NEUROLOGY

## 2020-11-12 PROCEDURE — 86140 C-REACTIVE PROTEIN: CPT

## 2020-11-12 PROCEDURE — 74011250636 HC RX REV CODE- 250/636: Performed by: INTERNAL MEDICINE

## 2020-11-12 PROCEDURE — 74011250637 HC RX REV CODE- 250/637: Performed by: FAMILY MEDICINE

## 2020-11-12 PROCEDURE — 65270000029 HC RM PRIVATE

## 2020-11-12 PROCEDURE — 74011000258 HC RX REV CODE- 258: Performed by: FAMILY MEDICINE

## 2020-11-12 PROCEDURE — C9254 INJECTION, LACOSAMIDE: HCPCS | Performed by: PSYCHIATRY & NEUROLOGY

## 2020-11-12 PROCEDURE — 80202 ASSAY OF VANCOMYCIN: CPT

## 2020-11-12 PROCEDURE — 36415 COLL VENOUS BLD VENIPUNCTURE: CPT

## 2020-11-12 PROCEDURE — 74011250636 HC RX REV CODE- 250/636: Performed by: FAMILY MEDICINE

## 2020-11-12 PROCEDURE — 74011250636 HC RX REV CODE- 250/636: Performed by: PSYCHIATRY & NEUROLOGY

## 2020-11-12 PROCEDURE — 84145 PROCALCITONIN (PCT): CPT

## 2020-11-12 PROCEDURE — 83735 ASSAY OF MAGNESIUM: CPT

## 2020-11-12 PROCEDURE — 85025 COMPLETE CBC W/AUTO DIFF WBC: CPT

## 2020-11-12 PROCEDURE — 80053 COMPREHEN METABOLIC PANEL: CPT

## 2020-11-12 PROCEDURE — 99232 SBSQ HOSP IP/OBS MODERATE 35: CPT | Performed by: INTERNAL MEDICINE

## 2020-11-12 PROCEDURE — 71045 X-RAY EXAM CHEST 1 VIEW: CPT

## 2020-11-12 PROCEDURE — 74011250637 HC RX REV CODE- 250/637: Performed by: INTERNAL MEDICINE

## 2020-11-12 RX ORDER — POTASSIUM CHLORIDE AND SODIUM CHLORIDE 450; 150 MG/100ML; MG/100ML
INJECTION, SOLUTION INTRAVENOUS CONTINUOUS
Status: DISCONTINUED | OUTPATIENT
Start: 2020-11-12 | End: 2020-11-13

## 2020-11-12 RX ORDER — POTASSIUM CHLORIDE 1.5 G/1.77G
20 POWDER, FOR SOLUTION ORAL 2 TIMES DAILY
Status: DISCONTINUED | OUTPATIENT
Start: 2020-11-12 | End: 2020-11-13

## 2020-11-12 RX ORDER — POTASSIUM CHLORIDE 750 MG/1
40 TABLET, FILM COATED, EXTENDED RELEASE ORAL
Status: COMPLETED | OUTPATIENT
Start: 2020-11-12 | End: 2020-11-12

## 2020-11-12 RX ADMIN — PIPERACILLIN AND TAZOBACTAM 3.38 G: 3; .375 INJECTION, POWDER, LYOPHILIZED, FOR SOLUTION INTRAVENOUS at 14:09

## 2020-11-12 RX ADMIN — TOPIRAMATE 200 MG: 100 TABLET, FILM COATED ORAL at 22:09

## 2020-11-12 RX ADMIN — POTASSIUM CHLORIDE AND SODIUM CHLORIDE: 450; 150 INJECTION, SOLUTION INTRAVENOUS at 14:05

## 2020-11-12 RX ADMIN — VANCOMYCIN HYDROCHLORIDE 750 MG: 750 INJECTION, POWDER, LYOPHILIZED, FOR SOLUTION INTRAVENOUS at 09:50

## 2020-11-12 RX ADMIN — LEVETIRACETAM 1500 MG: 15 INJECTION INTRAVENOUS at 11:26

## 2020-11-12 RX ADMIN — LORAZEPAM 1 MG: 2 INJECTION, SOLUTION INTRAMUSCULAR; INTRAVENOUS at 09:03

## 2020-11-12 RX ADMIN — VANCOMYCIN HYDROCHLORIDE 750 MG: 750 INJECTION, POWDER, LYOPHILIZED, FOR SOLUTION INTRAVENOUS at 22:14

## 2020-11-12 RX ADMIN — SODIUM CHLORIDE 50 MG: 0.9 INJECTION INTRAVENOUS at 22:09

## 2020-11-12 RX ADMIN — PIPERACILLIN AND TAZOBACTAM 3.38 G: 3; .375 INJECTION, POWDER, LYOPHILIZED, FOR SOLUTION INTRAVENOUS at 22:20

## 2020-11-12 RX ADMIN — VANCOMYCIN HYDROCHLORIDE 750 MG: 750 INJECTION, POWDER, LYOPHILIZED, FOR SOLUTION INTRAVENOUS at 14:05

## 2020-11-12 RX ADMIN — SODIUM CHLORIDE 50 MG: 0.9 INJECTION INTRAVENOUS at 11:22

## 2020-11-12 RX ADMIN — ENOXAPARIN SODIUM 40 MG: 40 INJECTION SUBCUTANEOUS at 09:02

## 2020-11-12 RX ADMIN — POTASSIUM CHLORIDE 20 MEQ: 1.5 FOR SOLUTION ORAL at 22:09

## 2020-11-12 RX ADMIN — LEVETIRACETAM 1500 MG: 15 INJECTION INTRAVENOUS at 22:23

## 2020-11-12 RX ADMIN — POTASSIUM CHLORIDE 40 MEQ: 750 TABLET, FILM COATED, EXTENDED RELEASE ORAL at 11:27

## 2020-11-12 RX ADMIN — TOPIRAMATE 400 MG: 100 TABLET, FILM COATED ORAL at 09:03

## 2020-11-12 RX ADMIN — POTASSIUM CHLORIDE AND SODIUM CHLORIDE: 450; 150 INJECTION, SOLUTION INTRAVENOUS at 04:58

## 2020-11-12 NOTE — PROGRESS NOTES
NEURO PROGRESS NOTE        SUBJECTIVE:   Seizures, metabolic abnormalities, other medical problems      EXAM:  Awake though lethargic responds to voice  No seizures reported by medical staff  Mild diffuse weakness stable      ASSESSMENT/PLAN:  Continue antiseizure medication  ALLERGIES:    No Known Allergies    MEDS:      Current Facility-Administered Medications:     lacosamide (VIMPAT) 50 mg in 0.9% sodium chloride 100 mL IVPB, 50 mg, IntraVENous, BID, Erica Rincon MD, 50 mg at 11/11/20 2207    0.45% sodium chloride with KCl 20 mEq/L infusion, , IntraVENous, CONTINUOUS, Ethan Durand MD, Last Rate: 125 mL/hr at 11/12/20 0458    acetaminophen (TYLENOL) tablet 650 mg, 650 mg, Oral, Q4H PRN **OR** acetaminophen (TYLENOL) suppository 650 mg, 650 mg, Rectal, Q4H PRN, Unique Carpio MD, 650 mg at 11/11/20 1254    piperacillin-tazobactam (ZOSYN) 3.375 g in 0.9% sodium chloride (MBP/ADV) 100 mL MBP, 3.375 g, IntraVENous, Q8H, Unique Carpio MD, Last Rate: 25 mL/hr at 11/11/20 2207, 3.375 g at 11/11/20 2207    Vancomycin RX Dosing information, , Other, PRN, Celestine Matias MD    vancomycin (VANCOCIN) 750 mg in 0.9% sodium chloride 250 mL (VIAL-MATE), 750 mg, IntraVENous, Q24H, Celestine Matias MD, 750 mg at 11/11/20 1252    norethindrone-ethinyl estradiol (MICROGESTIN 1/20) 1-20 mg-mcg tablet 1 Tab (Patient Supplied), 1 Tab, Oral, DAILY, Unique Carpio MD, Stopped at 11/10/20 1400    LORazepam (ATIVAN) injection 1 mg, 1 mg, IntraVENous, Q2H PRN, Unique Carpio MD, 1 mg at 11/12/20 2126    polyethylene glycol (MIRALAX) packet 17 g, 17 g, Oral, DAILY PRN, Randy Carpio MD    ondansetron (ZOFRAN ODT) tablet 4 mg, 4 mg, Oral, Q8H PRN **OR** ondansetron (ZOFRAN) injection 4 mg, 4 mg, IntraVENous, Q6H PRN, Unique Carpio MD    enoxaparin (LOVENOX) injection 40 mg, 40 mg, SubCUTAneous, DAILY, Unique Carpio MD, 40 mg at 11/12/20 0902    [Held by provider] levETIRAcetam (KEPPRA) tablet 1,500 mg, 1,500 mg, Oral, BID, Unique Carpio MD, Stopped at 11/10/20 2100    topiramate (TOPAMAX) tablet 400 mg, 400 mg, Oral, DAILY WITH BREAKFAST, Unique Carpio MD, 400 mg at 11/12/20 6952    topiramate (TOPAMAX) tablet 200 mg, 200 mg, Oral, QHS, Unique Carpio MD, 200 mg at 11/11/20 2207    levETIRAcetam (KEPPRA) 1500 mg in saline (iso-osm) 100 ml IVPB, 1,500 mg, IntraVENous, Q12H, Unique Carpio MD, 1,500 mg at 11/11/20 2207    LABS:  Recent Results (from the past 24 hour(s))   PROTEIN URINE, RANDOM    Collection Time: 11/11/20  5:15 PM   Result Value Ref Range    Protein, urine random 84 (H) 0.0 - 11.9 mg/dL   POTASSIUM, UR, RANDOM    Collection Time: 11/11/20  5:15 PM   Result Value Ref Range    Potassium urine, random 44 mmol/L   PROTEIN/CREATININE RATIO, URINE    Collection Time: 11/11/20  5:15 PM   Result Value Ref Range    Protein, urine random 83 (H) 0.0 - 11.9 mg/dL    Creatinine, urine 189.00 mg/dL    Protein/Creat.  urine Ratio 0.4     CREATININE, UR, RANDOM    Collection Time: 11/11/20  5:15 PM   Result Value Ref Range    Creatinine, urine 190.00 mg/dL   SODIUM, UR, RANDOM    Collection Time: 11/11/20  5:15 PM   Result Value Ref Range    Sodium,urine random 66 mmol/L   GLUCOSE, POC    Collection Time: 11/11/20  5:34 PM   Result Value Ref Range    Glucose (POC) 84 65 - 100 mg/dL    Performed by Gama Bowens    URINALYSIS W/MICROSCOPIC    Collection Time: 11/11/20  6:00 PM   Result Value Ref Range    Color Yellow/Straw      Appearance Clear Clear      Specific gravity 1.028 1.003 - 1.030      pH (UA) 5.0 5.0 - 8.0      Protein 30 (A) Negative mg/dL    Glucose Negative Negative mg/dL    Ketone 80 (A) Negative mg/dL    Bilirubin Negative Negative      Blood Large (A) Negative      Urobilinogen 0.1 0.1 - 1.0 EU/dL    Nitrites Negative Negative      Leukocyte Esterase Small (A) Negative      WBC 20-50 0 - 4 /hpf    RBC 5-10 0 - 5 /hpf    Bacteria Negative Negative /hpf   CBC WITH AUTOMATED DIFF Collection Time: 11/12/20  4:30 AM   Result Value Ref Range    WBC 8.8 3.6 - 11.0 K/uL    RBC 3.70 (L) 3.80 - 5.20 M/uL    HGB 11.1 (L) 11.5 - 16.0 g/dL    HCT 35.7 35.0 - 47.0 %    MCV 96.5 80.0 - 99.0 FL    MCH 30.0 26.0 - 34.0 PG    MCHC 31.1 30.0 - 36.5 g/dL    RDW 14.2 11.5 - 14.5 %    PLATELET 895 555 - 107 K/uL    MPV 10.8 8.9 - 12.9 FL    NEUTROPHILS 71 32 - 75 %    LYMPHOCYTES 26 12 - 49 %    MONOCYTES 3 (L) 5 - 13 %    EOSINOPHILS 0 0 - 7 %    BASOPHILS 0 0 - 1 %    IMMATURE GRANULOCYTES 0 0.0 - 0.5 %    ABS. NEUTROPHILS 6.2 1.8 - 8.0 K/UL    ABS. LYMPHOCYTES 2.3 0.8 - 3.5 K/UL    ABS. MONOCYTES 0.3 0.0 - 1.0 K/UL    ABS. EOSINOPHILS 0.0 0.0 - 0.4 K/UL    ABS. BASOPHILS 0.0 0.0 - 0.1 K/UL    ABS. IMM. GRANS. 0.0 0.00 - 0.04 K/UL    DF AUTOMATED     METABOLIC PANEL, COMPREHENSIVE    Collection Time: 11/12/20  4:30 AM   Result Value Ref Range    Sodium 153 (H) 136 - 145 mmol/L    Potassium 3.2 (L) 3.5 - 5.1 mmol/L    Chloride 123 (H) 97 - 108 mmol/L    CO2 26 21 - 32 mmol/L    Anion gap 4 (L) 5 - 15 mmol/L    Glucose 118 (H) 65 - 100 mg/dL    BUN 19 6 - 20 mg/dL    Creatinine 0.89 0.55 - 1.02 mg/dL    BUN/Creatinine ratio 21 (H) 12 - 20      GFR est AA >60 >60 ml/min/1.73m2    GFR est non-AA >60 >60 ml/min/1.73m2    Calcium 8.8 8.5 - 10.1 mg/dL    Bilirubin, total 0.5 0.2 - 1.0 mg/dL    AST (SGOT) 240 (H) 15 - 37 U/L    ALT (SGPT) 63 12 - 78 U/L    Alk.  phosphatase 36 (L) 45 - 117 U/L    Protein, total 6.0 (L) 6.4 - 8.2 g/dL    Albumin 2.6 (L) 3.5 - 5.0 g/dL    Globulin 3.4 2.0 - 4.0 g/dL    A-G Ratio 0.8 (L) 1.1 - 2.2     C REACTIVE PROTEIN, QT    Collection Time: 11/12/20  4:30 AM   Result Value Ref Range    C-Reactive protein 3.73 (H) 0.00 - 0.60 mg/dL   PROCALCITONIN    Collection Time: 11/12/20  4:30 AM   Result Value Ref Range    Procalcitonin 1.04 (H) 0 ng/mL   MAGNESIUM    Collection Time: 11/12/20  4:30 AM   Result Value Ref Range    Magnesium 2.2 1.6 - 2.4 mg/dL       Visit Vitals  /72 Pulse 74   Temp 99.1 °F (37.3 °C)   Resp 24   Ht 5' (1.524 m)   Wt 51.7 kg (114 lb)   SpO2 98%   Breastfeeding No   BMI 22.26 kg/m²       Imaging:  XR CHEST PORT   Final Result      XR CHEST PORT   Final Result      XR CHEST PORT   Final Result   IMPRESSION: Underexpanded lungs. No focal infiltrate. No overt edema.

## 2020-11-12 NOTE — PROGRESS NOTES
[facility] KIDNEY     Renal Daily Progress Note:     Admission Date: 2020     Subjective: eyes open, not talking, no Sz activity, getting prepped for EEG. Now with NGT getting feeds      Review of Systems  Review of systems not obtained due to patient factors.     Objective:     Visit Vitals  /72   Pulse 74   Temp 99 °F (37.2 °C)   Resp 24   Ht 5' (1.524 m)   Wt 51.7 kg (114 lb)   SpO2 98%   Breastfeeding No   BMI 22.26 kg/m²     Temp (24hrs), Av.9 °F (37.7 °C), Min:98.4 °F (36.9 °C), Max:103.3 °F (39.6 °C)        Intake/Output Summary (Last 24 hours) at 2020 1209  Last data filed at 2020 0719  Gross per 24 hour   Intake 1760 ml   Output 540 ml   Net 1220 ml     Current Facility-Administered Medications   Medication Dose Route Frequency    vancomycin (VANCOCIN) 750 mg in 0.9% sodium chloride 250 mL (VIAL-MATE)  750 mg IntraVENous Q8H    lacosamide (VIMPAT) 50 mg in 0.9% sodium chloride 100 mL IVPB  50 mg IntraVENous BID    0.45% sodium chloride with KCl 20 mEq/L infusion   IntraVENous CONTINUOUS    acetaminophen (TYLENOL) tablet 650 mg  650 mg Oral Q4H PRN    Or    acetaminophen (TYLENOL) suppository 650 mg  650 mg Rectal Q4H PRN    piperacillin-tazobactam (ZOSYN) 3.375 g in 0.9% sodium chloride (MBP/ADV) 100 mL MBP  3.375 g IntraVENous Q8H    Vancomycin RX Dosing information   Other PRN    norethindrone-ethinyl estradiol (MICROGESTIN ) 1-20 mg-mcg tablet 1 Tab (Patient Supplied)  1 Tab Oral DAILY    LORazepam (ATIVAN) injection 1 mg  1 mg IntraVENous Q2H PRN    polyethylene glycol (MIRALAX) packet 17 g  17 g Oral DAILY PRN    ondansetron (ZOFRAN ODT) tablet 4 mg  4 mg Oral Q8H PRN    Or    ondansetron (ZOFRAN) injection 4 mg  4 mg IntraVENous Q6H PRN    enoxaparin (LOVENOX) injection 40 mg  40 mg SubCUTAneous DAILY    [Held by provider] levETIRAcetam (KEPPRA) tablet 1,500 mg  1,500 mg Oral BID    topiramate (TOPAMAX) tablet 400 mg  400 mg Oral DAILY WITH BREAKFAST    topiramate (TOPAMAX) tablet 200 mg  200 mg Oral QHS    levETIRAcetam (KEPPRA) 1500 mg in saline (iso-osm) 100 ml IVPB  1,500 mg IntraVENous Q12H       Physical Exam:General appearance: no distress, not interactive  Head: Normocephalic, without obvious abnormality, atraumatic  Neck: supple, symmetrical, trachea midline, no adenopathy and no JVD  Lungs: clear to auscultation bilaterally  Heart: regular rate and rhythm, no S3 or S4  Abdomen: soft, non-tender. Bowel sounds normal. No masses,  no organomegaly  Extremities: extremities normal, atraumatic, no cyanosis or edema  Lymph nodes: Cervical, supraclavicular, and axillary nodes normal.  Neurologic: Mental status: alertness: obtunded  Motor:spastic feet/ ankles    Data Review:     LABS:  Recent Labs     11/12/20  0430 11/11/20  0645 11/10/20  2245   * 160* 160*   K 3.2* 3.3* 3.4*   * 128* 127*   CO2 26 20* 17*   BUN 19 23* 25*   CREA 0.89 1.39* 1.38*   CA 8.8 8.4* 9.0   ALB 2.6* 2.8*  --    MG 2.2  --   --      Recent Labs     11/12/20  0430 11/11/20  0645 11/10/20  2245   WBC 8.8 11.9* 12.4*   HGB 11.1* 11.7 12.3   HCT 35.7 38.7 39.5    181 214     Recent Labs     11/11/20  1715   [address]   [address] 189.00  190.00       Assessment:   Renal Specific Problems  Hypernatremia with hyperchloremia  ?  Volume depletion (however, has been on normal saline for at least 2 liters): Urine lytes suggest volume depletion  Hypokalemia  Metabolic acidosis  STU resolved           Plan:     Obtain/ Order: labs/cultures/radiology/procedures:  renal panel in AM        Therapeutic:    Decrease IVF  KCL via NGT  Increase water via NGT        Fish Leyva MD    [phone]

## 2020-11-12 NOTE — PROGRESS NOTES
Progress Note    Patient: Jessenia Cuenca MRN: [id number]  SSN: [id number]    YOB: 1990  Age: 35 y.o. Sex: female      Admit Date: 11/9/2020    LOS: 3 days     Subjective:   Patient followed for sepsis with suspected UTI and aspiration pneumonia. Overnight she remained febrile but WBC is now normal.  Procalcitonin and CRP are elevated. Repeat CXR still showing clear lungs. She is on Vancomycin and Zosyn. Patient remains in ICU and somnolent with no apparent seizure activity. Objective:     Vitals:    11/12/20 0500 11/12/20 0600 11/12/20 0700 11/12/20 0742   BP: 125/72 112/70 117/72    Pulse:       Resp: 25 23 24    Temp:   99.1 °F (37.3 °C)    SpO2: 98% 98% 98% 98%   Weight:       Height:            Intake and Output:  Current Shift: 11/12 0701 - 11/12 1900  In: -   Out: 300 [Urine:300]  Last three shifts: 11/10 1901 - 11/12 0700  In: 2040 [I.V.:1890]  Out: 240 [Urine:240]    Physical Exam:    Constitutional:       General: She is not in acute distress. Appearance: She is ill-appearing. She is not diaphoretic. HENT:      Head: Normocephalic and atraumatic. Nose: Nose normal.      Mouth/Throat:      Pharynx: Oropharynx is clear. Eyes:      Pupils: Pupils are equal, round, and reactive to light. Neck:      Musculoskeletal: Neck supple. Cardiovascular:      Rate and Rhythm: Regular rhythm. Tachycardia present. Heart sounds: No murmur. Pulmonary:      Breath sounds: Normal breath sounds. Abdominal:      Palpations: Abdomen is soft. Tenderness: There is no abdominal tenderness. Genitourinary:     Comments: Irvin catheter  Musculoskeletal:      Right lower leg: No edema. Left lower leg: No edema. Skin:     Findings: No erythema or rash.    Neurological:      Comments: Unable to assess    No seizure activity   Psychiatric:      Comments: Unable to assess     Lab/Data Review:    WBC 8,800  Procalcitonin 1.04  CRP 3.73    Blood cultures (11/9) No growth at 3 days  Urine culture (11/11) Pending    CXR (11/12) Lungs clear; no interstitial or alveolar pulmonary edema. Stable NG tube. Cardiac silhouette enlargement. No pneumothorax or pleural effusion. Thoracic scoliosis. Assessment:     Active Problems:    UTI (urinary tract infection) (11/9/2020)      Sepsis ([address] (11/9/2020)      AMS (altered mental status) (11/9/2020)    1. Sepsis with fever, elevated lactic acid, leukocytosis, elevated procalcitonin and CRP. 2. Possible UTI with  pyuria, urine culture pending  3. At risk for aspiration pneumonia  4. Seizure disorder, active    Comment:  No radiographic signs of aspiration pneumonia. Plan:   1. Continue Vancomycin and Zosyn  2. Follow-up urine culture and blood cultures  3.  In am, repeat CBC, CRP and procalcitonin, done       Signed By: Lc De Guzman MD     November 12, 2020

## 2020-11-12 NOTE — PROGRESS NOTES
Vancomycin Dosing Update: 11/12 at 1130  Day: 3   Patient is currently on 750 mg q 24 regimen. Trough level has been drawn on 11/12 at 0920 before the 3rd dose was given at 1000 today. Vanc level resulted at 2.7. Adjusted the regimen to 750 mg IV q8h. First dose of this regimen will be given at 1400 today. Scheduled a trough tomorrow at 1200 before the 4th dose. Pharmacy will keep monitoring the levels and the C&S results in order to provide the appropriate consultation. Thank you.   Thad Gomez, PharmD

## 2020-11-12 NOTE — PROGRESS NOTES
General Daily Progress Note          Patient Name:   Herbie Julian       YOB: 1990       Age:  35 y.o. Admit Date: 11/9/2020      Subjective:       Patient has no seizures since this morning    Patient transferred to ICU patient had of continuous seizures  Patient also had fever of 101.3 yesterday    Sodium is 153             Objective:     Visit Vitals  /72   Pulse 74   Temp 99.1 °F (37.3 °C)   Resp 24   Ht 5' (1.524 m)   Wt 51.7 kg (114 lb)   SpO2 98%   Breastfeeding No   BMI 22.26 kg/m²        Recent Results (from the past 24 hour(s))   PROTEIN URINE, RANDOM    Collection Time: 11/11/20  5:15 PM   Result Value Ref Range    Protein, urine random 84 (H) 0.0 - 11.9 mg/dL   POTASSIUM, UR, RANDOM    Collection Time: 11/11/20  5:15 PM   Result Value Ref Range    Potassium urine, random 44 mmol/L   PROTEIN/CREATININE RATIO, URINE    Collection Time: 11/11/20  5:15 PM   Result Value Ref Range    Protein, urine random 83 (H) 0.0 - 11.9 mg/dL    Creatinine, urine 189.00 mg/dL    Protein/Creat.  urine Ratio 0.4     CREATININE, UR, RANDOM    Collection Time: 11/11/20  5:15 PM   Result Value Ref Range    Creatinine, urine 190.00 mg/dL   SODIUM, UR, RANDOM    Collection Time: 11/11/20  5:15 PM   Result Value Ref Range    Sodium,urine random 66 mmol/L   GLUCOSE, POC    Collection Time: 11/11/20  5:34 PM   Result Value Ref Range    Glucose (POC) 84 65 - 100 mg/dL    Performed by Jenifer Andrews    URINALYSIS W/MICROSCOPIC    Collection Time: 11/11/20  6:00 PM   Result Value Ref Range    Color Yellow/Straw      Appearance Clear Clear      Specific gravity 1.028 1.003 - 1.030      pH (UA) 5.0 5.0 - 8.0      Protein 30 (A) Negative mg/dL    Glucose Negative Negative mg/dL    Ketone 80 (A) Negative mg/dL    Bilirubin Negative Negative      Blood Large (A) Negative      Urobilinogen 0.1 0.1 - 1.0 EU/dL    Nitrites Negative Negative      Leukocyte Esterase Small (A) Negative      WBC 20-50 0 - 4 /hpf    RBC 5-10 0 - 5 /hpf    Bacteria Negative Negative /hpf   CBC WITH AUTOMATED DIFF    Collection Time: 11/12/20  4:30 AM   Result Value Ref Range    WBC 8.8 3.6 - 11.0 K/uL    RBC 3.70 (L) 3.80 - 5.20 M/uL    HGB 11.1 (L) 11.5 - 16.0 g/dL    HCT 35.7 35.0 - 47.0 %    MCV 96.5 80.0 - 99.0 FL    MCH 30.0 26.0 - 34.0 PG    MCHC 31.1 30.0 - 36.5 g/dL    RDW 14.2 11.5 - 14.5 %    PLATELET 910 308 - 232 K/uL    MPV 10.8 8.9 - 12.9 FL    NEUTROPHILS 71 32 - 75 %    LYMPHOCYTES 26 12 - 49 %    MONOCYTES 3 (L) 5 - 13 %    EOSINOPHILS 0 0 - 7 %    BASOPHILS 0 0 - 1 %    IMMATURE GRANULOCYTES 0 0.0 - 0.5 %    ABS. NEUTROPHILS 6.2 1.8 - 8.0 K/UL    ABS. LYMPHOCYTES 2.3 0.8 - 3.5 K/UL    ABS. MONOCYTES 0.3 0.0 - 1.0 K/UL    ABS. EOSINOPHILS 0.0 0.0 - 0.4 K/UL    ABS. BASOPHILS 0.0 0.0 - 0.1 K/UL    ABS. IMM. GRANS. 0.0 0.00 - 0.04 K/UL    DF AUTOMATED     METABOLIC PANEL, COMPREHENSIVE    Collection Time: 11/12/20  4:30 AM   Result Value Ref Range    Sodium 153 (H) 136 - 145 mmol/L    Potassium 3.2 (L) 3.5 - 5.1 mmol/L    Chloride 123 (H) 97 - 108 mmol/L    CO2 26 21 - 32 mmol/L    Anion gap 4 (L) 5 - 15 mmol/L    Glucose 118 (H) 65 - 100 mg/dL    BUN 19 6 - 20 mg/dL    Creatinine 0.89 0.55 - 1.02 mg/dL    BUN/Creatinine ratio 21 (H) 12 - 20      GFR est AA >60 >60 ml/min/1.73m2    GFR est non-AA >60 >60 ml/min/1.73m2    Calcium 8.8 8.5 - 10.1 mg/dL    Bilirubin, total 0.5 0.2 - 1.0 mg/dL    AST (SGOT) 240 (H) 15 - 37 U/L    ALT (SGPT) 63 12 - 78 U/L    Alk.  phosphatase 36 (L) 45 - 117 U/L    Protein, total 6.0 (L) 6.4 - 8.2 g/dL    Albumin 2.6 (L) 3.5 - 5.0 g/dL    Globulin 3.4 2.0 - 4.0 g/dL    A-G Ratio 0.8 (L) 1.1 - 2.2     C REACTIVE PROTEIN, QT    Collection Time: 11/12/20  4:30 AM   Result Value Ref Range    C-Reactive protein 3.73 (H) 0.00 - 0.60 mg/dL   PROCALCITONIN    Collection Time: 11/12/20  4:30 AM   Result Value Ref Range    Procalcitonin 1.04 (H) 0 ng/mL   MAGNESIUM    Collection Time: 11/12/20  4:30 AM   Result Value Ref Range    Magnesium 2.2 1.6 - 2.4 mg/dL     [unfilled]      Review of Systems    Nonverbal      Physical Exam:      Constitutional not in distress   head: Normocephalic and atraumatic. Eyes: Pupils are equal, round, and reactive to light. EOM are normal.   Cardiovascular: Normal rate, regular rhythm and normal heart sounds. Pulmonary/Chest: Breath sounds normal. No wheezes. No rales. Exhibits no tenderness. Abdominal: Soft. Bowel sounds are normal. There is no abdominal tenderness. There is no rebound and no guarding. Musculoskeletal: Normal range of motion. Neurological: Moves all extremities  XR CHEST PORT   Final Result      XR CHEST PORT   Final Result      XR CHEST PORT   Final Result   IMPRESSION: Underexpanded lungs. No focal infiltrate. No overt edema. Recent Results (from the past 24 hour(s))   PROTEIN URINE, RANDOM    Collection Time: 11/11/20  5:15 PM   Result Value Ref Range    Protein, urine random 84 (H) 0.0 - 11.9 mg/dL   POTASSIUM, UR, RANDOM    Collection Time: 11/11/20  5:15 PM   Result Value Ref Range    Potassium urine, random 44 mmol/L   PROTEIN/CREATININE RATIO, URINE    Collection Time: 11/11/20  5:15 PM   Result Value Ref Range    Protein, urine random 83 (H) 0.0 - 11.9 mg/dL    Creatinine, urine 189.00 mg/dL    Protein/Creat.  urine Ratio 0.4     CREATININE, UR, RANDOM    Collection Time: 11/11/20  5:15 PM   Result Value Ref Range    Creatinine, urine 190.00 mg/dL   SODIUM, UR, RANDOM    Collection Time: 11/11/20  5:15 PM   Result Value Ref Range    Sodium,urine random 66 mmol/L   GLUCOSE, POC    Collection Time: 11/11/20  5:34 PM   Result Value Ref Range    Glucose (POC) 84 65 - 100 mg/dL    Performed by Marichuy Stacy    URINALYSIS W/MICROSCOPIC    Collection Time: 11/11/20  6:00 PM   Result Value Ref Range    Color Yellow/Straw      Appearance Clear Clear      Specific gravity 1.028 1.003 - 1.030      pH (UA) 5.0 5.0 - 8.0      Protein 30 (A) Negative mg/dL Glucose Negative Negative mg/dL    Ketone 80 (A) Negative mg/dL    Bilirubin Negative Negative      Blood Large (A) Negative      Urobilinogen 0.1 0.1 - 1.0 EU/dL    Nitrites Negative Negative      Leukocyte Esterase Small (A) Negative      WBC 20-50 0 - 4 /hpf    RBC 5-10 0 - 5 /hpf    Bacteria Negative Negative /hpf   CBC WITH AUTOMATED DIFF    Collection Time: 11/12/20  4:30 AM   Result Value Ref Range    WBC 8.8 3.6 - 11.0 K/uL    RBC 3.70 (L) 3.80 - 5.20 M/uL    HGB 11.1 (L) 11.5 - 16.0 g/dL    HCT 35.7 35.0 - 47.0 %    MCV 96.5 80.0 - 99.0 FL    MCH 30.0 26.0 - 34.0 PG    MCHC 31.1 30.0 - 36.5 g/dL    RDW 14.2 11.5 - 14.5 %    PLATELET 558 027 - 153 K/uL    MPV 10.8 8.9 - 12.9 FL    NEUTROPHILS 71 32 - 75 %    LYMPHOCYTES 26 12 - 49 %    MONOCYTES 3 (L) 5 - 13 %    EOSINOPHILS 0 0 - 7 %    BASOPHILS 0 0 - 1 %    IMMATURE GRANULOCYTES 0 0.0 - 0.5 %    ABS. NEUTROPHILS 6.2 1.8 - 8.0 K/UL    ABS. LYMPHOCYTES 2.3 0.8 - 3.5 K/UL    ABS. MONOCYTES 0.3 0.0 - 1.0 K/UL    ABS. EOSINOPHILS 0.0 0.0 - 0.4 K/UL    ABS. BASOPHILS 0.0 0.0 - 0.1 K/UL    ABS. IMM. GRANS. 0.0 0.00 - 0.04 K/UL    DF AUTOMATED     METABOLIC PANEL, COMPREHENSIVE    Collection Time: 11/12/20  4:30 AM   Result Value Ref Range    Sodium 153 (H) 136 - 145 mmol/L    Potassium 3.2 (L) 3.5 - 5.1 mmol/L    Chloride 123 (H) 97 - 108 mmol/L    CO2 26 21 - 32 mmol/L    Anion gap 4 (L) 5 - 15 mmol/L    Glucose 118 (H) 65 - 100 mg/dL    BUN 19 6 - 20 mg/dL    Creatinine 0.89 0.55 - 1.02 mg/dL    BUN/Creatinine ratio 21 (H) 12 - 20      GFR est AA >60 >60 ml/min/1.73m2    GFR est non-AA >60 >60 ml/min/1.73m2    Calcium 8.8 8.5 - 10.1 mg/dL    Bilirubin, total 0.5 0.2 - 1.0 mg/dL    AST (SGOT) 240 (H) 15 - 37 U/L    ALT (SGPT) 63 12 - 78 U/L    Alk.  phosphatase 36 (L) 45 - 117 U/L    Protein, total 6.0 (L) 6.4 - 8.2 g/dL    Albumin 2.6 (L) 3.5 - 5.0 g/dL    Globulin 3.4 2.0 - 4.0 g/dL    A-G Ratio 0.8 (L) 1.1 - 2.2     C REACTIVE PROTEIN, QT    Collection Time: 11/12/20  4:30 AM   Result Value Ref Range    C-Reactive protein 3.73 (H) 0.00 - 0.60 mg/dL   PROCALCITONIN    Collection Time: 11/12/20  4:30 AM   Result Value Ref Range    Procalcitonin 1.04 (H) 0 ng/mL   MAGNESIUM    Collection Time: 11/12/20  4:30 AM   Result Value Ref Range    Magnesium 2.2 1.6 - 2.4 mg/dL       Results     Procedure Component Value Units Date/Time    CULTURE, URINE [[id number]] Collected:  11/11/20 1600    Order Status:  Completed Specimen:  Urine Updated:  11/11/20 1855    MRSA SCREEN - PCR (NASAL) [[id number]] Collected:  11/11/20 0530    Order Status:  Completed Specimen:  Swab Updated:  11/11/20 0831     MRSA by PCR, Nasal Not Detected       CULTURE, BLOOD #1 [[id number]] Collected:  11/09/20 1745    Order Status:  Canceled Specimen:  Blood     CULTURE, BLOOD #2 [[id number]] Collected:  11/09/20 1745    Order Status:  Canceled Specimen:  Blood     CULTURE, BLOOD, PAIRED [[id number]] Collected:  11/09/20 0900    Order Status:  Completed Specimen:  Blood Updated:  11/12/20 0927     Special Requests: No Special Requests        Culture result: No growth 3 days              Labs:     Recent Labs     11/12/20 0430 11/11/20 0645   WBC 8.8 11.9*   HGB 11.1* 11.7   HCT 35.7 38.7    181     Recent Labs     11/12/20 0430 11/11/20  0645 11/10/20  2245   * 160* 160*   K 3.2* 3.3* 3.4*   * 128* 127*   CO2 26 20* 17*   BUN 19 23* 25*   CREA 0.89 1.39* 1.38*   * 69 49*   CA 8.8 8.4* 9.0   MG 2.2  --   --      Recent Labs     11/12/20 0430 11/11/20  0645   ALT 63 56   AP 36* 35*   TBILI 0.5 0.5   TP 6.0* 6.3*   ALB 2.6* 2.8*   GLOB 3.4 3.5     No results for input(s): INR, PTP, APTT, INREXT, INREXT in the last 72 hours. No results for input(s): FE, TIBC, PSAT, FERR in the last 72 hours. No results found for: FOL, RBCF   No results for input(s): PH, PCO2, PO2 in the last 72 hours. No results for input(s): CPK, CKNDX, TROIQ in the last 72 hours.     No lab exists for component: CPKMB  No results found for: CHOL, CHOLX, CHLST, CHOLV, HDL, HDLP, LDL, LDLC, DLDLP, TGLX, TRIGL, TRIGP, CHHD, CHHDX  Lab Results   Component Value Date/Time    Glucose (POC) 84 11/11/2020 05:34 PM    Glucose (POC) 78 11/11/2020 07:51 AM    Glucose (POC) 60 (L) 11/11/2020 04:05 AM     Lab Results   Component Value Date/Time    Color Yellow/Straw 11/11/2020 06:00 PM    Appearance Clear 11/11/2020 06:00 PM    Specific gravity 1.028 11/11/2020 06:00 PM    pH (UA) 5.0 11/11/2020 06:00 PM    Protein 30 (A) 11/11/2020 06:00 PM    Glucose Negative 11/11/2020 06:00 PM    Ketone 80 (A) 11/11/2020 06:00 PM    Bilirubin Negative 11/11/2020 06:00 PM    Urobilinogen 0.1 11/11/2020 06:00 PM    Nitrites Negative 11/11/2020 06:00 PM    Leukocyte Esterase Small (A) 11/11/2020 06:00 PM    Bacteria Negative 11/11/2020 06:00 PM    WBC 20-50 11/11/2020 06:00 PM    RBC 5-10 11/11/2020 06:00 PM         Assessment:   Recurrent seizures  Sepsis  Complicated UTI  History of Seizures  Angelman Syndrome  Lactic acidosis  Metabolic acidosis  Hypernatremia  NG-tube in place  Hypokalemia      Plan:     Continue Zosyn/vancomycin  Continue Topamax  Continue Microgestin  Continue Keppra 1500 twice daily  Seen by the neurology started on Vimpat    Will to nephrology consult for hypernatremia    Medication was reviewed with patient's father by the nurse  Follow-up with the neurology  Follow-up the cultures  Rep;ace  Potassium  Half-normal saline with 20 of KCl 125 cc/h as per nephrology  Continue NG tube feeding    Discussed with the patient's sister Levi Landa  Her phone number is [phone]          Current Facility-Administered Medications:     lacosamide (VIMPAT) 50 mg in 0.9% sodium chloride 100 mL IVPB, 50 mg, IntraVENous, BID, TanwiPetar MD, 50 mg at 11/11/20 2207    0.45% sodium chloride with KCl 20 mEq/L infusion, , IntraVENous, CONTINUOUS, Rufino Allison MD, Last Rate: 125 mL/hr at 11/12/20 0458    acetaminophen (TYLENOL) tablet 650 mg, 650 mg, Oral, Q4H PRN **OR** acetaminophen (TYLENOL) suppository 650 mg, 650 mg, Rectal, Q4H PRN, Unique Carpio MD, 650 mg at 11/11/20 1254    piperacillin-tazobactam (ZOSYN) 3.375 g in 0.9% sodium chloride (MBP/ADV) 100 mL MBP, 3.375 g, IntraVENous, Q8H, Unique Carpio MD, Last Rate: 25 mL/hr at 11/11/20 2207, 3.375 g at 11/11/20 2207    Vancomycin RX Dosing information, , Other, PRN, Maribel Luna MD    vancomycin (VANCOCIN) 750 mg in 0.9% sodium chloride 250 mL (VIAL-MATE), 750 mg, IntraVENous, Q24H, Maribel Luna MD, 750 mg at 11/12/20 0950    norethindrone-ethinyl estradiol (MICROGESTIN 1/20) 1-20 mg-mcg tablet 1 Tab (Patient Supplied), 1 Tab, Oral, DAILY, Unique Carpio MD, Stopped at 11/10/20 1400    LORazepam (ATIVAN) injection 1 mg, 1 mg, IntraVENous, Q2H PRN, Unique Carpio MD, 1 mg at 11/12/20 6276    polyethylene glycol (MIRALAX) packet 17 g, 17 g, Oral, DAILY PRN, Scott Carpio MD    ondansetron (ZOFRAN ODT) tablet 4 mg, 4 mg, Oral, Q8H PRN **OR** ondansetron (ZOFRAN) injection 4 mg, 4 mg, IntraVENous, Q6H PRN, Unique Carpio MD    enoxaparin (LOVENOX) injection 40 mg, 40 mg, SubCUTAneous, DAILY, Unique Carpio MD, 40 mg at 11/12/20 0902    [Held by provider] levETIRAcetam (KEPPRA) tablet 1,500 mg, 1,500 mg, Oral, BID, Unique Carpio MD, Stopped at 11/10/20 2100    topiramate (TOPAMAX) tablet 400 mg, 400 mg, Oral, DAILY WITH BREAKFAST, Unique Carpio MD, 400 mg at 11/12/20 4120    topiramate (TOPAMAX) tablet 200 mg, 200 mg, Oral, QHS, Unique Carpio MD, 200 mg at 11/11/20 2207    levETIRAcetam (KEPPRA) 1500 mg in saline (iso-osm) 100 ml IVPB, 1,500 mg, IntraVENous, Q12H, Unique Carpio MD, 1,500 mg at 11/11/20 2207

## 2020-11-13 LAB
ALBUMIN SERPL-MCNC: 2.4 G/DL (ref 3.5–5)
ANION GAP SERPL CALC-SCNC: 4 MMOL/L (ref 5–15)
BACTERIA SPEC CULT: NORMAL
BASOPHILS # BLD: 0 K/UL (ref 0–0.1)
BASOPHILS NFR BLD: 0 % (ref 0–1)
BUN SERPL-MCNC: 12 MG/DL (ref 6–20)
BUN/CREAT SERPL: 18 (ref 12–20)
CA-I BLD-MCNC: 8.3 MG/DL (ref 8.5–10.1)
CHLORIDE SERPL-SCNC: 120 MMOL/L (ref 97–108)
CO2 SERPL-SCNC: 22 MMOL/L (ref 21–32)
CREAT SERPL-MCNC: 0.67 MG/DL (ref 0.55–1.02)
CRP SERPL-MCNC: 1.43 MG/DL (ref 0–0.6)
DIFFERENTIAL METHOD BLD: ABNORMAL
EOSINOPHIL # BLD: 0.1 K/UL (ref 0–0.4)
EOSINOPHIL NFR BLD: 1 % (ref 0–7)
ERYTHROCYTE [DISTWIDTH] IN BLOOD BY AUTOMATED COUNT: 13.6 % (ref 11.5–14.5)
GLUCOSE SERPL-MCNC: 136 MG/DL (ref 65–100)
HCT VFR BLD AUTO: 35.3 % (ref 35–47)
HGB BLD-MCNC: 11.1 G/DL (ref 11.5–16)
IMM GRANULOCYTES # BLD AUTO: 0 K/UL (ref 0–0.04)
IMM GRANULOCYTES NFR BLD AUTO: 0 % (ref 0–0.5)
LYMPHOCYTES # BLD: 1.3 K/UL (ref 0.8–3.5)
LYMPHOCYTES NFR BLD: 20 % (ref 12–49)
MAGNESIUM SERPL-MCNC: 2 MG/DL (ref 1.6–2.4)
MCH RBC QN AUTO: 30.2 PG (ref 26–34)
MCHC RBC AUTO-ENTMCNC: 31.4 G/DL (ref 30–36.5)
MCV RBC AUTO: 95.9 FL (ref 80–99)
MONOCYTES # BLD: 0.3 K/UL (ref 0–1)
MONOCYTES NFR BLD: 5 % (ref 5–13)
NEUTS SEG # BLD: 4.7 K/UL (ref 1.8–8)
NEUTS SEG NFR BLD: 74 % (ref 32–75)
OSMOLALITY UR: NORMAL MOSM/KG H2O
PHOSPHATE SERPL-MCNC: 2.6 MG/DL (ref 2.6–4.7)
PLATELET # BLD AUTO: 174 K/UL (ref 150–400)
PMV BLD AUTO: 11.3 FL (ref 8.9–12.9)
POTASSIUM SERPL-SCNC: 3.7 MMOL/L (ref 3.5–5.1)
PROCALCITONIN SERPL-MCNC: 0.47 NG/ML
RBC # BLD AUTO: 3.68 M/UL (ref 3.8–5.2)
SODIUM SERPL-SCNC: 146 MMOL/L (ref 136–145)
SPECIAL REQUESTS,SREQ: NORMAL
VANCOMYCIN TROUGH SERPL-MCNC: 14.6 UG/ML (ref 5–10)
WBC # BLD AUTO: 6.4 K/UL (ref 3.6–11)

## 2020-11-13 PROCEDURE — C9254 INJECTION, LACOSAMIDE: HCPCS | Performed by: PSYCHIATRY & NEUROLOGY

## 2020-11-13 PROCEDURE — 74011250636 HC RX REV CODE- 250/636: Performed by: INTERNAL MEDICINE

## 2020-11-13 PROCEDURE — 99232 SBSQ HOSP IP/OBS MODERATE 35: CPT | Performed by: INTERNAL MEDICINE

## 2020-11-13 PROCEDURE — 36415 COLL VENOUS BLD VENIPUNCTURE: CPT

## 2020-11-13 PROCEDURE — 74011000258 HC RX REV CODE- 258: Performed by: PSYCHIATRY & NEUROLOGY

## 2020-11-13 PROCEDURE — 74011250637 HC RX REV CODE- 250/637: Performed by: INTERNAL MEDICINE

## 2020-11-13 PROCEDURE — 80069 RENAL FUNCTION PANEL: CPT

## 2020-11-13 PROCEDURE — 74011000258 HC RX REV CODE- 258: Performed by: FAMILY MEDICINE

## 2020-11-13 PROCEDURE — 74011250636 HC RX REV CODE- 250/636: Performed by: FAMILY MEDICINE

## 2020-11-13 PROCEDURE — 83735 ASSAY OF MAGNESIUM: CPT

## 2020-11-13 PROCEDURE — 84145 PROCALCITONIN (PCT): CPT

## 2020-11-13 PROCEDURE — 74011250636 HC RX REV CODE- 250/636: Performed by: PSYCHIATRY & NEUROLOGY

## 2020-11-13 PROCEDURE — 80202 ASSAY OF VANCOMYCIN: CPT

## 2020-11-13 PROCEDURE — 86140 C-REACTIVE PROTEIN: CPT

## 2020-11-13 PROCEDURE — 65270000029 HC RM PRIVATE

## 2020-11-13 PROCEDURE — 74011250637 HC RX REV CODE- 250/637: Performed by: FAMILY MEDICINE

## 2020-11-13 PROCEDURE — 85025 COMPLETE CBC W/AUTO DIFF WBC: CPT

## 2020-11-13 RX ORDER — DEXTROSE MONOHYDRATE 50 MG/ML
100 INJECTION, SOLUTION INTRAVENOUS CONTINUOUS
Status: DISCONTINUED | OUTPATIENT
Start: 2020-11-13 | End: 2020-11-13

## 2020-11-13 RX ORDER — POTASSIUM CHLORIDE 1.5 G/1.77G
40 POWDER, FOR SOLUTION ORAL DAILY
Status: COMPLETED | OUTPATIENT
Start: 2020-11-13 | End: 2020-11-16

## 2020-11-13 RX ADMIN — LEVETIRACETAM 1500 MG: 15 INJECTION INTRAVENOUS at 23:42

## 2020-11-13 RX ADMIN — PIPERACILLIN AND TAZOBACTAM 3.38 G: 3; .375 INJECTION, POWDER, LYOPHILIZED, FOR SOLUTION INTRAVENOUS at 05:57

## 2020-11-13 RX ADMIN — ENOXAPARIN SODIUM 40 MG: 40 INJECTION SUBCUTANEOUS at 08:41

## 2020-11-13 RX ADMIN — SODIUM CHLORIDE 50 MG: 0.9 INJECTION INTRAVENOUS at 09:55

## 2020-11-13 RX ADMIN — POTASSIUM CHLORIDE AND SODIUM CHLORIDE: 450; 150 INJECTION, SOLUTION INTRAVENOUS at 05:51

## 2020-11-13 RX ADMIN — VANCOMYCIN HYDROCHLORIDE 750 MG: 750 INJECTION, POWDER, LYOPHILIZED, FOR SOLUTION INTRAVENOUS at 13:34

## 2020-11-13 RX ADMIN — PIPERACILLIN AND TAZOBACTAM 3.38 G: 3; .375 INJECTION, POWDER, LYOPHILIZED, FOR SOLUTION INTRAVENOUS at 13:36

## 2020-11-13 RX ADMIN — ACETAMINOPHEN 650 MG: 325 TABLET, FILM COATED ORAL at 02:39

## 2020-11-13 RX ADMIN — POTASSIUM CHLORIDE 20 MEQ: 1.5 FOR SOLUTION ORAL at 08:41

## 2020-11-13 RX ADMIN — TOPIRAMATE 400 MG: 100 TABLET, FILM COATED ORAL at 08:41

## 2020-11-13 RX ADMIN — VANCOMYCIN HYDROCHLORIDE 750 MG: 750 INJECTION, POWDER, LYOPHILIZED, FOR SOLUTION INTRAVENOUS at 05:52

## 2020-11-13 RX ADMIN — LEVETIRACETAM 1500 MG: 15 INJECTION INTRAVENOUS at 11:41

## 2020-11-13 RX ADMIN — POTASSIUM CHLORIDE 40 MEQ: 1.5 FOR SOLUTION ORAL at 11:48

## 2020-11-13 RX ADMIN — SODIUM CHLORIDE 50 MG: 0.9 INJECTION INTRAVENOUS at 21:33

## 2020-11-13 RX ADMIN — DEXTROSE MONOHYDRATE 100 ML/HR: 50 INJECTION, SOLUTION INTRAVENOUS at 11:42

## 2020-11-13 NOTE — PROGRESS NOTES
General Daily Progress Note          Patient Name:   Cristiano Robbins       YOB: 1990       Age:  35 y.o. Admit Date: 11/9/2020      Subjective:         No further seizures  Patient getting NG tube feeding  Open eyes  Labs pending for today             Objective:     Visit Vitals  BP (!) 128/93 (BP 1 Location: Right arm, BP Patient Position: At rest)   Pulse 90   Temp 98.7 °F (37.1 °C)   Resp 22   Ht 5' (1.524 m)   Wt 51.7 kg (114 lb)   SpO2 95%   Breastfeeding No   BMI 22.26 kg/m²        Recent Results (from the past 24 hour(s))   VANCOMYCIN, TROUGH    Collection Time: 11/12/20  9:20 AM   Result Value Ref Range    Vancomycin,trough 2.7 (L) 5.0 - 10.0 ug/mL    Reported dose date Not provided      Reported dose: Not provided Units     [unfilled]      Review of Systems    Nonverbal      Physical Exam:      Constitutional not in distress   head: Normocephalic and atraumatic. Eyes: Pupils are equal, round, and reactive to light. EOM are normal.   Cardiovascular: Normal rate, regular rhythm and normal heart sounds. Pulmonary/Chest: Breath sounds normal. No wheezes. No rales. Exhibits no tenderness. Abdominal: Soft. Bowel sounds are normal. There is no abdominal tenderness. There is no rebound and no guarding. Musculoskeletal: Normal range of motion. Neurological: Moves all extremities  XR CHEST PORT   Final Result      XR CHEST PORT   Final Result      XR CHEST PORT   Final Result   IMPRESSION: Underexpanded lungs. No focal infiltrate. No overt edema.            Recent Results (from the past 24 hour(s))   VANCOMYCIN, TROUGH    Collection Time: 11/12/20  9:20 AM   Result Value Ref Range    Vancomycin,trough 2.7 (L) 5.0 - 10.0 ug/mL    Reported dose date Not provided      Reported dose: Not provided Units       Results     Procedure Component Value Units Date/Time    CULTURE, URINE [[id number]] Collected:  11/11/20 1600    Order Status:  Completed Specimen:  Urine Updated:  11/13/20 6524 Special Requests: No Special Requests        Culture result: No Growth (<1000 cfu/mL)       MRSA SCREEN - PCR (NASAL) [[id number]] Collected:  11/11/20 0530    Order Status:  Completed Specimen:  Swab Updated:  11/11/20 0831     MRSA by PCR, Nasal Not Detected       CULTURE, BLOOD #1 [[id number]] Collected:  11/09/20 1745    Order Status:  Canceled Specimen:  Blood     CULTURE, BLOOD #2 [[id number]] Collected:  11/09/20 1745    Order Status:  Canceled Specimen:  Blood     CULTURE, BLOOD, PAIRED [[id number]] Collected:  11/09/20 0900    Order Status:  Completed Specimen:  Blood Updated:  11/12/20 0927     Special Requests: No Special Requests        Culture result: No growth 3 days              Labs:     Recent Labs     11/12/20 0430 11/11/20  0645   WBC 8.8 11.9*   HGB 11.1* 11.7   HCT 35.7 38.7    181     Recent Labs     11/12/20 0430 11/11/20 0645 11/10/20  2245   * 160* 160*   K 3.2* 3.3* 3.4*   * 128* 127*   CO2 26 20* 17*   BUN 19 23* 25*   CREA 0.89 1.39* 1.38*   * 69 49*   CA 8.8 8.4* 9.0   MG 2.2  --   --      Recent Labs     11/12/20 0430 11/11/20  0645   ALT 63 56   AP 36* 35*   TBILI 0.5 0.5   TP 6.0* 6.3*   ALB 2.6* 2.8*   GLOB 3.4 3.5     No results for input(s): INR, PTP, APTT, INREXT, INREXT in the last 72 hours. No results for input(s): FE, TIBC, PSAT, FERR in the last 72 hours. No results found for: FOL, RBCF   No results for input(s): PH, PCO2, PO2 in the last 72 hours. No results for input(s): CPK, CKNDX, TROIQ in the last 72 hours.     No lab exists for component: CPKMB  No results found for: CHOL, CHOLX, CHLST, CHOLV, HDL, HDLP, LDL, LDLC, DLDLP, TGLX, TRIGL, TRIGP, CHHD, CHHDX  Lab Results   Component Value Date/Time    Glucose (POC) 84 11/11/2020 05:34 PM    Glucose (POC) 78 11/11/2020 07:51 AM    Glucose (POC) 60 (L) 11/11/2020 04:05 AM     Lab Results   Component Value Date/Time    Color Yellow/Straw 11/11/2020 06:00 PM    Appearance Clear 11/11/2020 06:00 PM Specific gravity 1.028 11/11/2020 06:00 PM    pH (UA) 5.0 11/11/2020 06:00 PM    Protein 30 (A) 11/11/2020 06:00 PM    Glucose Negative 11/11/2020 06:00 PM    Ketone 80 (A) 11/11/2020 06:00 PM    Bilirubin Negative 11/11/2020 06:00 PM    Urobilinogen 0.1 11/11/2020 06:00 PM    Nitrites Negative 11/11/2020 06:00 PM    Leukocyte Esterase Small (A) 11/11/2020 06:00 PM    Bacteria Negative 11/11/2020 06:00 PM    WBC 20-50 11/11/2020 06:00 PM    RBC 5-10 11/11/2020 06:00 PM         Assessment:   Recurrent seizures  Sepsis  Complicated UTI  History of Seizures  Angelman Syndrome  Lactic acidosis  Metabolic acidosis  Hypernatremia  NG-tube in place  Hypokalemia      Plan:     Continue Zosyn/vancomycin  Continue Topamax  Continue Microgestin  Continue Keppra 1500 twice daily  Seen by the neurology started on Vimpat    Follow-up sodium level    Medication was reviewed with patient's father by the nurse  Follow-up with the neurology  Follow-up the cultures  Rep;ace  Potassium  Half-normal saline with 20 of KCl 75 cc/h as per nephrology  Continue NG tube feeding    Today labs are pending  Discussed with the patient's sister Idalia Engle  Her phone number is [phone]          Current Facility-Administered Medications:     vancomycin (VANCOCIN) 750 mg in 0.9% sodium chloride 250 mL (VIAL-MATE), 750 mg, IntraVENous, Q8H, Danelle Kauffman MD, 750 mg at 11/13/20 0552    potassium chloride (KLOR-CON) packet for solution 20 mEq, 20 mEq, Per NG tube, BID, Manju Phillips MD, 20 mEq at 11/13/20 0841    0.45% sodium chloride with KCl 20 mEq/L infusion, , IntraVENous, CONTINUOUS, Manju Phillips MD, Last Rate: 75 mL/hr at 11/13/20 0551    lacosamide (VIMPAT) 50 mg in 0.9% sodium chloride 100 mL IVPB, 50 mg, IntraVENous, BID, Sandie Tidwell MD, 50 mg at 11/12/20 2209    acetaminophen (TYLENOL) tablet 650 mg, 650 mg, Oral, Q4H PRN, 650 mg at 11/13/20 0239 **OR** acetaminophen (TYLENOL) suppository 650 mg, 650 mg, Rectal, Q4H PRN, Yossi Lubin MD, 650 mg at 11/11/20 1254    piperacillin-tazobactam (ZOSYN) 3.375 g in 0.9% sodium chloride (MBP/ADV) 100 mL MBP, 3.375 g, IntraVENous, Q8H, Unique Carpio MD, Last Rate: 25 mL/hr at 11/13/20 0557, 3.375 g at 11/13/20 0557    Vancomycin RX Dosing information, , Other, PRN, Nithya Ocampo MD    norethindrone-ethinyl estradiol (MICROGESTIN 1/20) 1-20 mg-mcg tablet 1 Tab (Patient Supplied), 1 Tab, Oral, DAILY, Axel Carpio MD, Stopped at 11/10/20 1400    LORazepam (ATIVAN) injection 1 mg, 1 mg, IntraVENous, Q2H PRN, Unique Carpio MD, 1 mg at 11/12/20 9156    polyethylene glycol (MIRALAX) packet 17 g, 17 g, Oral, DAILY PRN, Axel Carpio MD    ondansetron (ZOFRAN ODT) tablet 4 mg, 4 mg, Oral, Q8H PRN **OR** ondansetron (ZOFRAN) injection 4 mg, 4 mg, IntraVENous, Q6H PRN, Unique Carpio MD    enoxaparin (LOVENOX) injection 40 mg, 40 mg, SubCUTAneous, DAILY, Unique Carpio MD, 40 mg at 11/13/20 0841    [Held by provider] levETIRAcetam (KEPPRA) tablet 1,500 mg, 1,500 mg, Oral, BID, Unique Carpio MD, Stopped at 11/10/20 2100    topiramate (TOPAMAX) tablet 400 mg, 400 mg, Oral, DAILY WITH BREAKFAST, Unique Carpio MD, 400 mg at 11/13/20 0841    topiramate (TOPAMAX) tablet 200 mg, 200 mg, Oral, QHS, Unique Carpio MD, 200 mg at 11/12/20 2209    levETIRAcetam (KEPPRA) 1500 mg in saline (iso-osm) 100 ml IVPB, 1,500 mg, IntraVENous, Q12H, Unique Carpio MD, 1,500 mg at 11/12/20 0711

## 2020-11-13 NOTE — PROCEDURES
[facility]  EEG    Name:  Carmita Alfaro  MR#:  [id number]  :  1990  ACCOUNT #:  [de-identified]  DATE OF SERVICE:  11/10/2020    DIAGNOSIS:  Seizures. The patient also has Angelman syndrome. DESCRIPTION:  Recording is done digitally on computer. Electrodes are placed in a 10-20 international system. Initial coordinates recommend calibration, followed by biocalibration. Initial montages are bipolar montage. TECHNIQUE:  Tracings started with the patient having fast beta activity throughout. As the recording continues, she is hooked up to an EKG machine that shows a heart rate of 108. The fast beta activity persists throughout the recording. Photic stimulation and hyperventilation are not done. IMPRESSION:  This is an EEG showing fast beta activity secondary to exposure to CNS acting drugs. There is no seizure activity.       Alex Claudio MD      [email or web address]  D:  2020 11:02  T:  2020 12:30  JOB #:  [id number]

## 2020-11-13 NOTE — PROGRESS NOTES
NEURO PROGRESS NOTE        SUBJECTIVE:   Angelman syndrome, seizures, UTI, metabolic abnormalities      EXAM:  Awake, being attended to by nurses. No seizures reported. Patient appears to be in a good mood.       ASSESSMENT/PLAN:  Current seizure medication continues  ALLERGIES:    No Known Allergies    MEDS:      Current Facility-Administered Medications:     vancomycin (VANCOCIN) 750 mg in 0.9% sodium chloride 250 mL (VIAL-MATE), 750 mg, IntraVENous, Q8H, Seema Raza MD, 750 mg at 11/13/20 0552    potassium chloride (KLOR-CON) packet for solution 20 mEq, 20 mEq, Per NG tube, BID, Sofia Steinberg MD, 20 mEq at 11/13/20 0841    0.45% sodium chloride with KCl 20 mEq/L infusion, , IntraVENous, CONTINUOUS, Sofia Steinberg MD, Last Rate: 75 mL/hr at 11/13/20 0551    lacosamide (VIMPAT) 50 mg in 0.9% sodium chloride 100 mL IVPB, 50 mg, IntraVENous, BID, Geraldine Prado MD, 50 mg at 11/13/20 0955    acetaminophen (TYLENOL) tablet 650 mg, 650 mg, Oral, Q4H PRN, 650 mg at 11/13/20 0239 **OR** acetaminophen (TYLENOL) suppository 650 mg, 650 mg, Rectal, Q4H PRN, Unique Carpio MD, 650 mg at 11/11/20 1254    piperacillin-tazobactam (ZOSYN) 3.375 g in 0.9% sodium chloride (MBP/ADV) 100 mL MBP, 3.375 g, IntraVENous, Q8H, Unique Carpio MD, Last Rate: 25 mL/hr at 11/13/20 0557, 3.375 g at 11/13/20 0557    Vancomycin RX Dosing information, , Other, PRN, Seema Raza MD    norethindrone-ethinyl estradiol (MICROGESTIN 1/20) 1-20 mg-mcg tablet 1 Tab (Patient Supplied), 1 Tab, Oral, DAILY, Unique Carpio MD, Stopped at 11/10/20 1400    LORazepam (ATIVAN) injection 1 mg, 1 mg, IntraVENous, Q2H PRN, Unique Carpio MD, 1 mg at 11/12/20 3100    polyethylene glycol (MIRALAX) packet 17 g, 17 g, Oral, DAILY PRN, Kimberlyn Carpio MD    ondansetron (ZOFRAN ODT) tablet 4 mg, 4 mg, Oral, Q8H PRN **OR** ondansetron (ZOFRAN) injection 4 mg, 4 mg, IntraVENous, Q6H PRN, Kimberlyn Carpio MD    enoxaparin (LOVENOX) injection 40 mg, 40 mg, SubCUTAneous, DAILY, Unique Carpio MD, 40 mg at 11/13/20 0841    [Held by provider] levETIRAcetam (KEPPRA) tablet 1,500 mg, 1,500 mg, Oral, BID, Unique Carpio MD, Stopped at 11/10/20 2100    topiramate (TOPAMAX) tablet 400 mg, 400 mg, Oral, DAILY WITH BREAKFAST, Unique Carpio MD, 400 mg at 11/13/20 0841    topiramate (TOPAMAX) tablet 200 mg, 200 mg, Oral, QHS, Unique Carpio MD, 200 mg at 11/12/20 2209    levETIRAcetam (KEPPRA) 1500 mg in saline (iso-osm) 100 ml IVPB, 1,500 mg, IntraVENous, Q12H, Unique Carpio MD, 1,500 mg at 11/12/20 2223    LABS:  No results found for this or any previous visit (from the past 24 hour(s)). Visit Vitals  BP (!) 128/93 (BP 1 Location: Right arm, BP Patient Position: At rest)   Pulse 90   Temp 98.7 °F (37.1 °C)   Resp 22   Ht 5' (1.524 m)   Wt 51.7 kg (114 lb)   SpO2 95%   Breastfeeding No   BMI 22.26 kg/m²       Imaging:  XR CHEST PORT   Final Result      XR CHEST PORT   Final Result      XR CHEST PORT   Final Result   IMPRESSION: Underexpanded lungs. No focal infiltrate. No overt edema.

## 2020-11-13 NOTE — PROGRESS NOTES
[facility] [place] [facility]     Renal Daily Progress Note:     Admission Date: 2020     Subjective: eyes open, not talking,  and K 3.7  Review of Systems  Review of systems not obtained due to patient factors.     Objective:     Visit Vitals  /78 (BP 1 Location: Right arm, BP Patient Position: At rest)   Pulse 70   Temp 98.2 °F (36.8 °C)   Resp 20   Ht 5' (1.524 m)   Wt 51.7 kg (114 lb)   SpO2 96%   Breastfeeding No   BMI 22.26 kg/m²     Temp (24hrs), Av.3 °F (37.4 °C), Min:98.2 °F (36.8 °C), Max:100.9 °F (38.3 °C)        Intake/Output Summary (Last 24 hours) at 2020 1350  Last data filed at 2020 1143  Gross per 24 hour   Intake 1252 ml   Output 2025 ml   Net -773 ml     Current Facility-Administered Medications   Medication Dose Route Frequency    potassium chloride (KLOR-CON) packet for solution 40 mEq  40 mEq Per NG tube DAILY    vancomycin (VANCOCIN) 750 mg in 0.9% sodium chloride 250 mL (VIAL-MATE)  750 mg IntraVENous Q8H    lacosamide (VIMPAT) 50 mg in 0.9% sodium chloride 100 mL IVPB  50 mg IntraVENous BID    acetaminophen (TYLENOL) tablet 650 mg  650 mg Oral Q4H PRN    Or    acetaminophen (TYLENOL) suppository 650 mg  650 mg Rectal Q4H PRN    piperacillin-tazobactam (ZOSYN) 3.375 g in 0.9% sodium chloride (MBP/ADV) 100 mL MBP  3.375 g IntraVENous Q8H    Vancomycin RX Dosing information   Other PRN    norethindrone-ethinyl estradiol (MICROGESTIN ) 1-20 mg-mcg tablet 1 Tab (Patient Supplied)  1 Tab Oral DAILY    LORazepam (ATIVAN) injection 1 mg  1 mg IntraVENous Q2H PRN    polyethylene glycol (MIRALAX) packet 17 g  17 g Oral DAILY PRN    ondansetron (ZOFRAN ODT) tablet 4 mg  4 mg Oral Q8H PRN    Or    ondansetron (ZOFRAN) injection 4 mg  4 mg IntraVENous Q6H PRN    enoxaparin (LOVENOX) injection 40 mg  40 mg SubCUTAneous DAILY    [Held by provider] levETIRAcetam (KEPPRA) tablet 1,500 mg  1,500 mg Oral BID    topiramate (TOPAMAX) tablet 400 mg  400 mg Oral DAILY WITH BREAKFAST    topiramate (TOPAMAX) tablet 200 mg  200 mg Oral QHS    levETIRAcetam (KEPPRA) 1500 mg in saline (iso-osm) 100 ml IVPB  1,500 mg IntraVENous Q12H       Physical Exam:General appearance: no distress, not interactive  Head: Normocephalic, without obvious abnormality, atraumatic  Neck: supple, symmetrical, trachea midline, no adenopathy and no JVD  Lungs: clear to auscultation bilaterally  Heart: regular rate and rhythm, no S3 or S4  Abdomen: soft, non-tender.  Bowel sounds normal. No masses,  no organomegaly  Extremities: extremities normal, atraumatic, no cyanosis or edema  Lymph nodes: Cervical, supraclavicular, and axillary nodes normal.  Neurologic: Mental status: alertness: obtunded  Motor:spastic feet/ ankles    Data Review:     LABS:  Recent Labs     11/13/20  1200 11/13/20  1115 11/12/20  0430 11/11/20  0645   NA  --  146* 153* 160*   K  --  3.7 3.2* 3.3*   CL  --  120* 123* 128*   CO2  --  22 26 20*   BUN  --  12 19 23*   CREA  --  0.67 0.89 1.39*   CA  --  8.3* 8.8 8.4*   ALB  --  2.4* 2.6* 2.8*   PHOS  --  2.6  --   --    MG 2.0  --  2.2  --      Recent Labs     11/13/20  1123 11/12/20  0430 11/11/20  0645   WBC 6.4 8.8 11.9*   HGB 11.1* 11.1* 11.7   HCT 35.3 35.7 38.7    188 181     Recent Labs     11/11/20  1715   WALLACE 77   KU 40   CREAU 189.00  190.00       Assessment:   Renal Specific Problems  Hypernatremia   Hypokalemia  Metabolic acidosis  STU resolved           Plan:     Obtain/ Order: labs/cultures/radiology/procedures:  renal panel in AM        Therapeutic:    Increase  ml q4hr  KCL 40 meq daily via NGT          Anne Bell MD

## 2020-11-13 NOTE — PROGRESS NOTES
Comprehensive Nutrition Assessment    Type and Reason for Visit: Reassess(interim)    Nutrition Recommendations/Plan:   Continue TF of Osmolite 1.2 at goal rate of 53ml/hr, continuous,      Flush with 100 ml water x 5 times/day      Providing 1526 kcal, 71 g pro, 1543 ml fluids (98%kcal, 114% pro, 99% fluid)     Document TF rate, water flushes, and GRVs in EMR    Obtain updated wt    Nutrition Assessment:  Presented to ED with seizures, vomiting, and fever. Admitted for UTI, STU. Noted pt father reported 4 episodes of black emesis over the night pta; ?possible GI bleed. CXR indicated under expanded lungs, no overt edema (11/11). Pt transferred to ICU early morning 11/11, transferred to med floor 11/12. Noted pt lethargic likely d/t recurrent seizures- no seizures noted over past 24 hours. Nephrology following 2/2 STU- now resolved. Noted D5 initiated 11/11 at 50ml/hr d/t low BG - d/c'd. Upon previous assessment RN reported pt with poor appetite consuming nothing. Noted MD ordered NG placement and TF s/p DI discussion with RN. TF initiated 11/11, goal reached 11/12, noted pt at goal with good tolerance. Labs: Na 146, , Alk Phos 36, ALT WNL. Meds: Lovenox, lacosamide, keppra, microgestin, zosyn, kcl, topamax, vancomycin. Malnutrition Assessment:  Malnutrition Status:  No malnutrition    Context:  Acute illness         Estimated Daily Nutrient Needs:  Energy (kcal): 1550kcal (30kcal/kg); Weight Used for Energy Requirements: Current  Protein (g): 62g (1.2g/kg); Weight Used for Protein Requirements: Current  Fluid (ml/day): 1550ml; Method Used for Fluid Requirements: 1 ml/kcal  Needs based on seizures and wt maintenance    Nutrition Related Findings:  NFPE not performed 2/2 precautions. Pt appeared nourished upon visual assessment. Noted NG placed on 11/11 post DI recs. No N/V/C/D per EMR. Last BM 11/12. Noted n/v/d pta. No hx of dysphagia document. No edema.          Wounds:    None       Current Nutrition Therapies:  DIET TUBE FEEDING Osmolite 1.2  Current Tube Feeding (TF) Orders:   · Feeding Route: Nasogastric  · Formula: Osmolite 1.2  · Schedule:Continuous    · Regimen: 53ml/hr  · Additives/Modulars:   None  · Water Flushes: 100 ml water x 5 times/day  · Current TF & Flush Orders Provides: 1526 kcal, 71 g pro, 1543 ml fluids  · Goal TF & Flush Orders Provides: At goal      Anthropometric Measures:  · Height:  5' (152.4 cm)  · Current Body Wt:  51.7 kg (113 lb 15.7 oz)(11/09)   · Admission Body Wt:  113 lb 15.7 oz    · Usual Body Wt:  (BEATRIZ)     · Ideal Body Wt:  100 lbs:  114 %   · BMI Category:  Normal weight (BMI 18.5-24. 9)     Wt hx: BEATRIZ     Nutrition Diagnosis:   · Inadequate protein-energy intake related to cognitive or neurological impairment as evidenced by intake 0-25%, poor intake prior to admission(AMS, seizures)      Nutrition Interventions:   Food and/or Nutrient Delivery: Continue tube feeding  Nutrition Education and Counseling: No recommendations at this time  Coordination of Nutrition Care: Continue to monitor while inpatient    Goals:  Intake > 75% EENs > 7 days, (progressing)   wt maintenance +/- 0.5kg/wk, (BEATRIZ)   Lytes WNL (progressing)  GI status  (progressing)    Nutrition Monitoring and Evaluation:   Behavioral-Environmental Outcomes: None identified  Food/Nutrient Intake Outcomes: Enteral nutrition intake/tolerance  Physical Signs/Symptoms Outcomes: Weight, Nausea/vomiting, Biochemical data    Discharge Planning:     Too soon to determine     Electronically signed by Leigh Ann Krause on 11/13/2020 at 2:08 PM    Contact:

## 2020-11-13 NOTE — PROGRESS NOTES
Problem: Falls - Risk of  Goal: *Absence of Falls  Description: Document Jayant Dalton Fall Risk and appropriate interventions in the flowsheet. Outcome: Progressing Towards Goal  Note: Fall Risk Interventions:       Mentation Interventions: Bed/chair exit alarm    Medication Interventions: Bed/chair exit alarm    Elimination Interventions:  Toilet paper/wipes in reach    History of Falls Interventions: Bed/chair exit alarm, Room close to nurse's station

## 2020-11-13 NOTE — PROGRESS NOTES
Progress Note    Patient: Clyde Kingston MRN: [id number]  SSN: [id number]    YOB: 1990  Age: 35 y.o. Sex: female      Admit Date: 11/9/2020    LOS: 4 days     Subjective:   Patient followed for sepsis with suspected UTI and aspiration pneumonia. Overnight she remained febrile but WBC is now normal.  Procalcitonin and CRP are elevated. Repeat CXR still showing clear lungs. She is on Vancomycin and Zosyn. She has been transferred out of the ICU. She is somnolent at this time but appears to be resting comfortably. Objective:     Vitals:    11/12/20 2103 11/13/20 0238 11/13/20 0558 11/13/20 0845   BP: 116/75 116/73  (!) 128/93   Pulse: 87 96  90   Resp: 22 24  22   Temp: 99.8 °F (37.7 °C) (!) 100.9 °F (38.3 °C) 98.9 °F (37.2 °C) 98.7 °F (37.1 °C)   SpO2: 95% 95%  95%   Weight:       Height:            Intake and Output:  Current Shift: No intake/output data recorded. Last three shifts: 11/11 1901 - 11/13 0700  In: 2852 [I.V.:1610]  Out: 1925 [Urine:1925]    Physical Exam:    Constitutional:       General: She is not in acute distress. Appearance: She is ill-appearing. She is not diaphoretic. HENT:      Head: Normocephalic and atraumatic. Nose: Nose normal.      Mouth/Throat:      Pharynx: Oropharynx is clear. Eyes:      Pupils: Pupils are equal, round, and reactive to light. Neck:      Musculoskeletal: Neck supple. Cardiovascular:      Rate and Rhythm: Regular rhythm. Tachycardia present. Heart sounds: No murmur. Pulmonary:      Breath sounds: Normal breath sounds. Abdominal:      Palpations: Abdomen is soft. Tenderness: There is no abdominal tenderness. Genitourinary:     Comments: Irvin catheter  Musculoskeletal:      Right lower leg: No edema. Left lower leg: No edema. Skin:     Findings: No erythema or rash.    Neurological:      Comments: Unable to assess  No seizure activity   Psychiatric:      Comments: Unable to assess     Lab/Data Review:    WBC 6,400  Procalcitonin 1.04  CRP 1.43 <3.73    Blood cultures (11/9) No growth at 4 days  Urine culture (11/11) No growth FINAL    CXR (11/12) Lungs clear; no interstitial or alveolar pulmonary edema. Stable NG tube. Cardiac silhouette enlargement. No pneumothorax or pleural effusion. Thoracic scoliosis. Assessment:     Active Problems:    UTI (urinary tract infection) (11/9/2020)      Sepsis ([address] (11/9/2020)      AMS (altered mental status) (11/9/2020)    1. Sepsis with fever, elevated lactic acid, leukocytosis, elevated procalcitonin and CRP. 2. Possible UTI with  pyuria, urine culture negative  3. At risk for aspiration pneumonia  4. Seizure disorder, active    Comment:  Patient remains febrile but no clear source of infection with negative urine culture and unremarkable CXR. Blood cultures still pending. Plan:   1. Continue Vancomycin and Zosyn pending blood culture results  2. Follow-up blood cultures  3.  On Monday, repeat CBC, CRP and procalcitonin, done       Signed By: Chantelle Vitale MD     November 13, 2020

## 2020-11-14 LAB
ALBUMIN SERPL-MCNC: 2.7 G/DL (ref 3.5–5)
ALBUMIN SERPL-MCNC: 2.7 G/DL (ref 3.5–5)
ALBUMIN/GLOB SERPL: 0.7 {RATIO} (ref 1.1–2.2)
ALP SERPL-CCNC: 47 U/L (ref 45–117)
ALT SERPL-CCNC: 93 U/L (ref 12–78)
ANION GAP SERPL CALC-SCNC: 5 MMOL/L (ref 5–15)
ANION GAP SERPL CALC-SCNC: 9 MMOL/L (ref 5–15)
AST SERPL W P-5'-P-CCNC: 256 U/L (ref 15–37)
BASOPHILS # BLD: 0 K/UL (ref 0–0.1)
BASOPHILS NFR BLD: 0 % (ref 0–1)
BILIRUB SERPL-MCNC: 0.4 MG/DL (ref 0.2–1)
BUN SERPL-MCNC: 10 MG/DL (ref 6–20)
BUN SERPL-MCNC: 10 MG/DL (ref 6–20)
BUN/CREAT SERPL: 14 (ref 12–20)
BUN/CREAT SERPL: 14 (ref 12–20)
CA-I BLD-MCNC: 8.7 MG/DL (ref 8.5–10.1)
CA-I BLD-MCNC: 8.8 MG/DL (ref 8.5–10.1)
CHLORIDE SERPL-SCNC: 117 MMOL/L (ref 97–108)
CHLORIDE SERPL-SCNC: 118 MMOL/L (ref 97–108)
CO2 SERPL-SCNC: 19 MMOL/L (ref 21–32)
CO2 SERPL-SCNC: 20 MMOL/L (ref 21–32)
CREAT SERPL-MCNC: 0.69 MG/DL (ref 0.55–1.02)
CREAT SERPL-MCNC: 0.73 MG/DL (ref 0.55–1.02)
DIFFERENTIAL METHOD BLD: ABNORMAL
EOSINOPHIL # BLD: 0.1 K/UL (ref 0–0.4)
EOSINOPHIL NFR BLD: 1 % (ref 0–7)
ERYTHROCYTE [DISTWIDTH] IN BLOOD BY AUTOMATED COUNT: 13.4 % (ref 11.5–14.5)
GLOBULIN SER CALC-MCNC: 3.7 G/DL (ref 2–4)
GLUCOSE BLD STRIP.AUTO-MCNC: 102 MG/DL (ref 65–100)
GLUCOSE BLD STRIP.AUTO-MCNC: 105 MG/DL (ref 65–100)
GLUCOSE BLD STRIP.AUTO-MCNC: 108 MG/DL (ref 65–100)
GLUCOSE BLD STRIP.AUTO-MCNC: 121 MG/DL (ref 65–100)
GLUCOSE SERPL-MCNC: 109 MG/DL (ref 65–100)
GLUCOSE SERPL-MCNC: 112 MG/DL (ref 65–100)
HCT VFR BLD AUTO: 36.7 % (ref 35–47)
HGB BLD-MCNC: 11.5 G/DL (ref 11.5–16)
IMM GRANULOCYTES # BLD AUTO: 0 K/UL (ref 0–0.04)
IMM GRANULOCYTES NFR BLD AUTO: 0 % (ref 0–0.5)
LYMPHOCYTES # BLD: 1.5 K/UL (ref 0.8–3.5)
LYMPHOCYTES NFR BLD: 16 % (ref 12–49)
Lab: NORMAL
MCH RBC QN AUTO: 29.9 PG (ref 26–34)
MCHC RBC AUTO-ENTMCNC: 31.3 G/DL (ref 30–36.5)
MCV RBC AUTO: 95.6 FL (ref 80–99)
MONOCYTES # BLD: 0.5 K/UL (ref 0–1)
MONOCYTES NFR BLD: 5 % (ref 5–13)
NEUTS SEG # BLD: 7.5 K/UL (ref 1.8–8)
NEUTS SEG NFR BLD: 78 % (ref 32–75)
PERFORMED BY, TECHID: ABNORMAL
PHOSPHATE SERPL-MCNC: 2.7 MG/DL (ref 2.6–4.7)
PLATELET # BLD AUTO: 174 K/UL (ref 150–400)
PMV BLD AUTO: 11.8 FL (ref 8.9–12.9)
POTASSIUM SERPL-SCNC: 4.4 MMOL/L (ref 3.5–5.1)
POTASSIUM SERPL-SCNC: 4.5 MMOL/L (ref 3.5–5.1)
PROT SERPL-MCNC: 6.4 G/DL (ref 6.4–8.2)
RBC # BLD AUTO: 3.84 M/UL (ref 3.8–5.2)
SODIUM SERPL-SCNC: 142 MMOL/L (ref 136–145)
SODIUM SERPL-SCNC: 146 MMOL/L (ref 136–145)
WBC # BLD AUTO: 9.6 K/UL (ref 3.6–11)

## 2020-11-14 PROCEDURE — 85025 COMPLETE CBC W/AUTO DIFF WBC: CPT

## 2020-11-14 PROCEDURE — 74011250637 HC RX REV CODE- 250/637: Performed by: FAMILY MEDICINE

## 2020-11-14 PROCEDURE — 74011250637 HC RX REV CODE- 250/637: Performed by: INTERNAL MEDICINE

## 2020-11-14 PROCEDURE — 80053 COMPREHEN METABOLIC PANEL: CPT

## 2020-11-14 PROCEDURE — 74011250636 HC RX REV CODE- 250/636: Performed by: INTERNAL MEDICINE

## 2020-11-14 PROCEDURE — 65270000029 HC RM PRIVATE

## 2020-11-14 PROCEDURE — 74011000258 HC RX REV CODE- 258: Performed by: FAMILY MEDICINE

## 2020-11-14 PROCEDURE — 36415 COLL VENOUS BLD VENIPUNCTURE: CPT

## 2020-11-14 PROCEDURE — 82962 GLUCOSE BLOOD TEST: CPT

## 2020-11-14 PROCEDURE — 80069 RENAL FUNCTION PANEL: CPT

## 2020-11-14 PROCEDURE — 74011250636 HC RX REV CODE- 250/636: Performed by: FAMILY MEDICINE

## 2020-11-14 PROCEDURE — 99232 SBSQ HOSP IP/OBS MODERATE 35: CPT | Performed by: INTERNAL MEDICINE

## 2020-11-14 PROCEDURE — 74011250637 HC RX REV CODE- 250/637: Performed by: PSYCHIATRY & NEUROLOGY

## 2020-11-14 RX ORDER — LACOSAMIDE 10 MG/ML
50 SOLUTION ORAL 2 TIMES DAILY
Status: DISCONTINUED | OUTPATIENT
Start: 2020-11-14 | End: 2020-11-17 | Stop reason: HOSPADM

## 2020-11-14 RX ORDER — METOCLOPRAMIDE HYDROCHLORIDE 5 MG/ML
5 INJECTION INTRAMUSCULAR; INTRAVENOUS EVERY 6 HOURS
Status: DISCONTINUED | OUTPATIENT
Start: 2020-11-14 | End: 2020-11-17 | Stop reason: HOSPADM

## 2020-11-14 RX ORDER — DEXTROSE MONOHYDRATE 50 MG/ML
75 INJECTION, SOLUTION INTRAVENOUS CONTINUOUS
Status: DISCONTINUED | OUTPATIENT
Start: 2020-11-14 | End: 2020-11-15

## 2020-11-14 RX ADMIN — DEXTROSE MONOHYDRATE 75 ML/HR: 50 INJECTION, SOLUTION INTRAVENOUS at 09:02

## 2020-11-14 RX ADMIN — POTASSIUM CHLORIDE 40 MEQ: 1.5 FOR SOLUTION ORAL at 08:52

## 2020-11-14 RX ADMIN — METOCLOPRAMIDE HYDROCHLORIDE 5 MG: 5 INJECTION INTRAMUSCULAR; INTRAVENOUS at 09:01

## 2020-11-14 RX ADMIN — METOCLOPRAMIDE HYDROCHLORIDE 5 MG: 5 INJECTION INTRAMUSCULAR; INTRAVENOUS at 15:56

## 2020-11-14 RX ADMIN — LEVETIRACETAM 1500 MG: 500 SOLUTION ORAL at 20:33

## 2020-11-14 RX ADMIN — TOPIRAMATE 200 MG: 100 TABLET, FILM COATED ORAL at 00:03

## 2020-11-14 RX ADMIN — VANCOMYCIN HYDROCHLORIDE 750 MG: 750 INJECTION, POWDER, LYOPHILIZED, FOR SOLUTION INTRAVENOUS at 15:56

## 2020-11-14 RX ADMIN — PIPERACILLIN AND TAZOBACTAM 3.38 G: 3; .375 INJECTION, POWDER, LYOPHILIZED, FOR SOLUTION INTRAVENOUS at 08:51

## 2020-11-14 RX ADMIN — DEXTROSE MONOHYDRATE 75 ML/HR: 50 INJECTION, SOLUTION INTRAVENOUS at 03:47

## 2020-11-14 RX ADMIN — VANCOMYCIN HYDROCHLORIDE 750 MG: 750 INJECTION, POWDER, LYOPHILIZED, FOR SOLUTION INTRAVENOUS at 00:03

## 2020-11-14 RX ADMIN — LACOSAMIDE 50 MG: 10 SOLUTION ORAL at 11:52

## 2020-11-14 RX ADMIN — ONDANSETRON 4 MG: 2 INJECTION INTRAMUSCULAR; INTRAVENOUS at 10:26

## 2020-11-14 RX ADMIN — VANCOMYCIN HYDROCHLORIDE 750 MG: 750 INJECTION, POWDER, LYOPHILIZED, FOR SOLUTION INTRAVENOUS at 08:51

## 2020-11-14 RX ADMIN — PIPERACILLIN AND TAZOBACTAM 3.38 G: 3; .375 INJECTION, POWDER, LYOPHILIZED, FOR SOLUTION INTRAVENOUS at 15:56

## 2020-11-14 RX ADMIN — PIPERACILLIN AND TAZOBACTAM 3.38 G: 3; .375 INJECTION, POWDER, LYOPHILIZED, FOR SOLUTION INTRAVENOUS at 00:03

## 2020-11-14 RX ADMIN — METOCLOPRAMIDE HYDROCHLORIDE 5 MG: 5 INJECTION INTRAMUSCULAR; INTRAVENOUS at 03:46

## 2020-11-14 RX ADMIN — LEVETIRACETAM 1500 MG: 500 SOLUTION ORAL at 10:26

## 2020-11-14 RX ADMIN — METOCLOPRAMIDE HYDROCHLORIDE 5 MG: 5 INJECTION INTRAMUSCULAR; INTRAVENOUS at 21:00

## 2020-11-14 RX ADMIN — ENOXAPARIN SODIUM 40 MG: 40 INJECTION SUBCUTANEOUS at 08:52

## 2020-11-14 RX ADMIN — ACETAMINOPHEN 650 MG: 325 TABLET, FILM COATED ORAL at 20:32

## 2020-11-14 RX ADMIN — TOPIRAMATE 200 MG: 100 TABLET, FILM COATED ORAL at 20:33

## 2020-11-14 RX ADMIN — ACETAMINOPHEN 650 MG: 325 TABLET, FILM COATED ORAL at 00:10

## 2020-11-14 RX ADMIN — TOPIRAMATE 400 MG: 100 TABLET, FILM COATED ORAL at 08:52

## 2020-11-14 RX ADMIN — LACOSAMIDE 50 MG: 10 SOLUTION ORAL at 20:33

## 2020-11-14 NOTE — PROGRESS NOTES
General Daily Progress Note          Patient Name:   Sudhakar Ring       YOB: 1990       Age:  35 y.o. Admit Date: 11/9/2020      Subjective:         No further seizures  Patient getting NG tube feeding  Open eyes               Objective:     Visit Vitals  /77 (BP 1 Location: Left arm, BP Patient Position: At rest)   Pulse 75   Temp 98.8 °F (37.1 °C)   Resp 16   Ht 5' (1.524 m)   Wt 51.7 kg (114 lb)   SpO2 94%   Breastfeeding No   BMI 22.26 kg/m²        Recent Results (from the past 24 hour(s))   VANCOMYCIN, TROUGH    Collection Time: 11/13/20  8:21 PM   Result Value Ref Range    Vancomycin,trough 14.6 (H) 5.0 - 10.0 ug/mL   GLUCOSE, POC    Collection Time: 11/14/20  4:10 AM   Result Value Ref Range    Glucose (POC) 102 (H) 65 - 100 mg/dL    Performed by [employer]    GLUCOSE, POC    Collection Time: 11/14/20  7:42 AM   Result Value Ref Range    Glucose (POC) 105 (H) 65 - 100 mg/dL    Performed by [employer]    CBC WITH AUTOMATED DIFF    Collection Time: 11/14/20 11:12 AM   Result Value Ref Range    WBC 9.6 3.6 - 11.0 K/uL    RBC 3.84 3.80 - 5.20 M/uL    HGB 11.5 11.5 - 16.0 g/dL    HCT 36.7 35.0 - 47.0 %    MCV 95.6 80.0 - 99.0 FL    MCH 29.9 26.0 - 34.0 PG    MCHC 31.3 30.0 - 36.5 g/dL    RDW 13.4 11.5 - 14.5 %    PLATELET 022 999 - 112 K/uL    MPV 11.8 8.9 - 12.9 FL    NEUTROPHILS 78 (H) 32 - 75 %    LYMPHOCYTES 16 12 - 49 %    MONOCYTES 5 5 - 13 %    EOSINOPHILS 1 0 - 7 %    BASOPHILS 0 0 - 1 %    IMMATURE GRANULOCYTES 0 0.0 - 0.5 %    ABS. NEUTROPHILS 7.5 1.8 - 8.0 K/UL    ABS. LYMPHOCYTES 1.5 0.8 - 3.5 K/UL    ABS. MONOCYTES 0.5 0.0 - 1.0 K/UL    ABS. EOSINOPHILS 0.1 0.0 - 0.4 K/UL    ABS. BASOPHILS 0.0 0.0 - 0.1 K/UL    ABS. IMM.  GRANS. 0.0 0.00 - 0.04 K/UL    DF AUTOMATED     METABOLIC PANEL, COMPREHENSIVE    Collection Time: 11/14/20 11:12 AM   Result Value Ref Range    Sodium 142 136 - 145 mmol/L    Potassium 4.4 3.5 - 5.1 mmol/L    Chloride 117 (H) 97 - 108 mmol/L CO2 20 (L) 21 - 32 mmol/L    Anion gap 5 5 - 15 mmol/L    Glucose 112 (H) 65 - 100 mg/dL    BUN 10 6 - 20 mg/dL    Creatinine 0.69 0.55 - 1.02 mg/dL    BUN/Creatinine ratio 14 12 - 20      GFR est AA >60 >60 ml/min/1.73m2    GFR est non-AA >60 >60 ml/min/1.73m2    Calcium 8.8 8.5 - 10.1 mg/dL    Bilirubin, total 0.4 0.2 - 1.0 mg/dL    AST (SGOT) 256 (H) 15 - 37 U/L    ALT (SGPT) 93 (H) 12 - 78 U/L    Alk. phosphatase 47 45 - 117 U/L    Protein, total 6.4 6.4 - 8.2 g/dL    Albumin 2.7 (L) 3.5 - 5.0 g/dL    Globulin 3.7 2.0 - 4.0 g/dL    A-G Ratio 0.7 (L) 1.1 - 2.2     RENAL FUNCTION PANEL    Collection Time: 11/14/20 11:12 AM   Result Value Ref Range    Sodium 146 (H) 136 - 145 mmol/L    Potassium 4.5 3.5 - 5.1 mmol/L    Chloride 118 (H) 97 - 108 mmol/L    CO2 19 (L) 21 - 32 mmol/L    Anion gap 9 5 - 15 mmol/L    Glucose 109 (H) 65 - 100 mg/dL    BUN 10 6 - 20 mg/dL    Creatinine 0.73 0.55 - 1.02 mg/dL    BUN/Creatinine ratio 14 12 - 20      GFR est AA >60 >60 ml/min/1.73m2    GFR est non-AA >60 >60 ml/min/1.73m2    Calcium 8.7 8.5 - 10.1 mg/dL    Phosphorus 2.7 2.6 - 4.7 mg/dL    Albumin 2.7 (L) 3.5 - 5.0 g/dL   GLUCOSE, POC    Collection Time: 11/14/20 12:06 PM   Result Value Ref Range    Glucose (POC) 121 (H) 65 - 100 mg/dL    Performed by Author Tamiko      [unfilled]      Review of Systems    Nonverbal      Physical Exam:      Constitutional not in distress   head: Normocephalic and atraumatic. Eyes: Pupils are equal, round, and reactive to light. EOM are normal.   Cardiovascular: Normal rate, regular rhythm and normal heart sounds. Pulmonary/Chest: Breath sounds normal. No wheezes. No rales. Exhibits no tenderness. Abdominal: Soft. Bowel sounds are normal. There is no abdominal tenderness. There is no rebound and no guarding. Musculoskeletal: Normal range of motion.    Neurological: Moves all extremities  XR CHEST PORT   Final Result      XR CHEST PORT   Final Result      XR CHEST PORT   Final Result   IMPRESSION: Underexpanded lungs. No focal infiltrate. No overt edema. Recent Results (from the past 24 hour(s))   VANCOMYCIN, TROUGH    Collection Time: 11/13/20  8:21 PM   Result Value Ref Range    Vancomycin,trough 14.6 (H) 5.0 - 10.0 ug/mL   GLUCOSE, POC    Collection Time: 11/14/20  4:10 AM   Result Value Ref Range    Glucose (POC) 102 (H) 65 - 100 mg/dL    Performed by Jovan August, POC    Collection Time: 11/14/20  7:42 AM   Result Value Ref Range    Glucose (POC) 105 (H) 65 - 100 mg/dL    Performed by Luis Tapia    CBC WITH AUTOMATED DIFF    Collection Time: 11/14/20 11:12 AM   Result Value Ref Range    WBC 9.6 3.6 - 11.0 K/uL    RBC 3.84 3.80 - 5.20 M/uL    HGB 11.5 11.5 - 16.0 g/dL    HCT 36.7 35.0 - 47.0 %    MCV 95.6 80.0 - 99.0 FL    MCH 29.9 26.0 - 34.0 PG    MCHC 31.3 30.0 - 36.5 g/dL    RDW 13.4 11.5 - 14.5 %    PLATELET 051 710 - 565 K/uL    MPV 11.8 8.9 - 12.9 FL    NEUTROPHILS 78 (H) 32 - 75 %    LYMPHOCYTES 16 12 - 49 %    MONOCYTES 5 5 - 13 %    EOSINOPHILS 1 0 - 7 %    BASOPHILS 0 0 - 1 %    IMMATURE GRANULOCYTES 0 0.0 - 0.5 %    ABS. NEUTROPHILS 7.5 1.8 - 8.0 K/UL    ABS. LYMPHOCYTES 1.5 0.8 - 3.5 K/UL    ABS. MONOCYTES 0.5 0.0 - 1.0 K/UL    ABS. EOSINOPHILS 0.1 0.0 - 0.4 K/UL    ABS. BASOPHILS 0.0 0.0 - 0.1 K/UL    ABS. IMM.  GRANS. 0.0 0.00 - 0.04 K/UL    DF AUTOMATED     METABOLIC PANEL, COMPREHENSIVE    Collection Time: 11/14/20 11:12 AM   Result Value Ref Range    Sodium 142 136 - 145 mmol/L    Potassium 4.4 3.5 - 5.1 mmol/L    Chloride 117 (H) 97 - 108 mmol/L    CO2 20 (L) 21 - 32 mmol/L    Anion gap 5 5 - 15 mmol/L    Glucose 112 (H) 65 - 100 mg/dL    BUN 10 6 - 20 mg/dL    Creatinine 0.69 0.55 - 1.02 mg/dL    BUN/Creatinine ratio 14 12 - 20      GFR est AA >60 >60 ml/min/1.73m2    GFR est non-AA >60 >60 ml/min/1.73m2    Calcium 8.8 8.5 - 10.1 mg/dL    Bilirubin, total 0.4 0.2 - 1.0 mg/dL    AST (SGOT) 256 (H) 15 - 37 U/L    ALT (SGPT) 93 (H) 12 - 78 U/L    Alk.  phosphatase 47 45 - 117 U/L    Protein, total 6.4 6.4 - 8.2 g/dL    Albumin 2.7 (L) 3.5 - 5.0 g/dL    Globulin 3.7 2.0 - 4.0 g/dL    A-G Ratio 0.7 (L) 1.1 - 2.2     RENAL FUNCTION PANEL    Collection Time: 11/14/20 11:12 AM   Result Value Ref Range    Sodium 146 (H) 136 - 145 mmol/L    Potassium 4.5 3.5 - 5.1 mmol/L    Chloride 118 (H) 97 - 108 mmol/L    CO2 19 (L) 21 - 32 mmol/L    Anion gap 9 5 - 15 mmol/L    Glucose 109 (H) 65 - 100 mg/dL    BUN 10 6 - 20 mg/dL    Creatinine 0.73 0.55 - 1.02 mg/dL    BUN/Creatinine ratio 14 12 - 20      GFR est AA >60 >60 ml/min/1.73m2    GFR est non-AA >60 >60 ml/min/1.73m2    Calcium 8.7 8.5 - 10.1 mg/dL    Phosphorus 2.7 2.6 - 4.7 mg/dL    Albumin 2.7 (L) 3.5 - 5.0 g/dL   GLUCOSE, POC    Collection Time: 11/14/20 12:06 PM   Result Value Ref Range    Glucose (POC) 121 (H) 65 - 100 mg/dL    Performed by Alena Bell        Results     Procedure Component Value Units Date/Time    CULTURE, URINE [[id number]] Collected:  11/11/20 1600    Order Status:  Completed Specimen:  Urine Updated:  11/13/20 0856     Special Requests: No Special Requests        Culture result: No Growth (<1000 cfu/mL)       MRSA SCREEN - PCR (NASAL) [[id number]] Collected:  11/11/20 0530    Order Status:  Completed Specimen:  Swab Updated:  11/11/20 0831     MRSA by PCR, Nasal Not Detected       CULTURE, BLOOD #1 [[id number]] Collected:  11/09/20 1745    Order Status:  Canceled Specimen:  Blood     CULTURE, BLOOD #2 [[id number]] Collected:  11/09/20 1745    Order Status:  Canceled Specimen:  Blood     CULTURE, BLOOD, PAIRED [[id number]] Collected:  11/09/20 0900    Order Status:  Completed Specimen:  Blood Updated:  11/13/20 0948     Special Requests: No Special Requests        Culture result: No growth 4 days              Labs:     Recent Labs     11/14/20  1112 11/13/20  1123   WBC 9.6 6.4   HGB 11.5 11.1*   HCT 36.7 35.3    174     Recent Labs     11/14/20  1112 11/13/20  1200 11/13/20  1115 11/12/20  0430   *  142  --  146* 153*   K 4.5  4.4  --  3.7 3.2*   *  117*  --  120* 123*   CO2 19*  20*  --  22 26   BUN 10  10  --  12 19   CREA 0.73  0.69  --  0.67 0.89   *  112*  --  136* 118*   CA 8.7  8.8  --  8.3* 8.8   MG  --  2.0  --  2.2   PHOS 2.7  --  2.6  --      Recent Labs     11/14/20  1112 11/13/20 1115 11/12/20  0430   ALT 93*  --  63   AP 47  --  36*   TBILI 0.4  --  0.5   TP 6.4  --  6.0*   ALB 2.7*  2.7* 2.4* 2.6*   GLOB 3.7  --  3.4     No results for input(s): INR, PTP, APTT, INREXT, INREXT in the last 72 hours. No results for input(s): FE, TIBC, PSAT, FERR in the last 72 hours. No results found for: FOL, RBCF   No results for input(s): PH, PCO2, PO2 in the last 72 hours. No results for input(s): CPK, CKNDX, TROIQ in the last 72 hours.     No lab exists for component: CPKMB  No results found for: CHOL, CHOLX, CHLST, CHOLV, HDL, HDLP, LDL, LDLC, DLDLP, TGLX, TRIGL, TRIGP, CHHD, CHHDX  Lab Results   Component Value Date/Time    Glucose (POC) 121 (H) 11/14/2020 12:06 PM    Glucose (POC) 105 (H) 11/14/2020 07:42 AM    Glucose (POC) 102 (H) 11/14/2020 04:10 AM    Glucose (POC) 84 11/11/2020 05:34 PM    Glucose (POC) 78 11/11/2020 07:51 AM     Lab Results   Component Value Date/Time    Color Yellow/Straw 11/11/2020 06:00 PM    Appearance Clear 11/11/2020 06:00 PM    Specific gravity 1.028 11/11/2020 06:00 PM    pH (UA) 5.0 11/11/2020 06:00 PM    Protein 30 (A) 11/11/2020 06:00 PM    Glucose Negative 11/11/2020 06:00 PM    Ketone 80 (A) 11/11/2020 06:00 PM    Bilirubin Negative 11/11/2020 06:00 PM    Urobilinogen 0.1 11/11/2020 06:00 PM    Nitrites Negative 11/11/2020 06:00 PM    Leukocyte Esterase Small (A) 11/11/2020 06:00 PM    Bacteria Negative 11/11/2020 06:00 PM    WBC 20-50 11/11/2020 06:00 PM    RBC 5-10 11/11/2020 06:00 PM         Assessment:   Recurrent seizures  Sepsis  Complicated UTI  History of Seizures  Angelman Syndrome  Lactic acidosis  Metabolic acidosis  Hypernatremia  NG-tube in place  Hypokalemia      Plan:     Continue Zosyn/vancomycin  Continue Topamax  Continue Microgestin  Continue Keppra 1500 twice daily  Seen by the neurology started on Vimpat    Follow-up sodium level    Medication was reviewed with patient's father by the nurse  Follow-up with the neurology  Follow-up the cultures  Rep;ace  Potassium  Half-normal saline with 20 of KCl 75 cc/h as per nephrology  Continue NG tube feeding    Today labs are pending  Discussed with the patient's sister Catherine Isbell  Her phone number is [phone]          Current Facility-Administered Medications:     dextrose 5% infusion, 75 mL/hr, IntraVENous, CONTINUOUS, Rosalio Chi MD, Last Rate: 75 mL/hr at 11/14/20 0902, 75 mL/hr at 11/14/20 0902    metoclopramide HCl (REGLAN) injection 5 mg, 5 mg, IntraVENous, Q6H, Rosalio Chi MD, 5 mg at 11/14/20 1556    lacosamide (VIMPAT) oral solution 50 mg, 50 mg, Per G Tube, BID, Donta Rojas MD, 50 mg at 11/14/20 1152    levETIRAcetam (KEPPRA) oral solution 1,500 mg, 1,500 mg, Per NG tube, BID, Donta Rojas MD, 1,500 mg at 11/14/20 1026    potassium chloride (KLOR-CON) packet for solution 40 mEq, 40 mEq, Per NG tube, DAILY, Allen Pope MD, 40 mEq at 11/14/20 0852    [START ON 11/15/2020] Vancomycin Trough 11/15 at 1200, , Other, PRN, Vitalyi Brannon MD    vancomycin (VANCOCIN) 750 mg in 0.9% sodium chloride 250 mL (VIAL-MATE), 750 mg, IntraVENous, Q8H, Karel Alvarez MD, 750 mg at 11/14/20 1556    acetaminophen (TYLENOL) tablet 650 mg, 650 mg, Oral, Q4H PRN, 650 mg at 11/14/20 0010 **OR** acetaminophen (TYLENOL) suppository 650 mg, 650 mg, Rectal, Q4H PRN, Unique Carpio MD, 650 mg at 11/11/20 1254    piperacillin-tazobactam (ZOSYN) 3.375 g in 0.9% sodium chloride (MBP/ADV) 100 mL MBP, 3.375 g, IntraVENous, Q8H, Unique Carpio MD, Last Rate: 25 mL/hr at 11/14/20 1556, 3.375 g at 11/14/20 1556    Vancomycin RX Dosing information, , Other, PRN, Endelon Alvarez MD    norethindrone-ethinyl estradiol (MICROGESTIN 1/20) 1-20 mg-mcg tablet 1 Tab (Patient Supplied), 1 Tab, Oral, DAILY, Vitaliy Barnnon MD, Stopped at 11/10/20 1400    LORazepam (ATIVAN) injection 1 mg, 1 mg, IntraVENous, Q2H PRN, Unique Carpio MD, 1 mg at 11/12/20 3964    polyethylene glycol (MIRALAX) packet 17 g, 17 g, Oral, DAILY PRN, Marilyn Carpio MD    ondansetron (ZOFRAN ODT) tablet 4 mg, 4 mg, Oral, Q8H PRN **OR** ondansetron (ZOFRAN) injection 4 mg, 4 mg, IntraVENous, Q6H PRN, Unique Carpio MD, 4 mg at 11/14/20 1026    enoxaparin (LOVENOX) injection 40 mg, 40 mg, SubCUTAneous, DAILY, Unique Carpio MD, 40 mg at 11/14/20 1528    topiramate (TOPAMAX) tablet 400 mg, 400 mg, Oral, DAILY WITH BREAKFAST, Vitaliy Brannon MD, 400 mg at 11/14/20 9976    topiramate (TOPAMAX) tablet 200 mg, 200 mg, Oral, QHS, Unique Carpio MD, 200 mg at 11/14/20 0003

## 2020-11-14 NOTE — PROGRESS NOTES
Problem: Pressure Injury - Risk of  Goal: *Prevention of pressure injury  Description: Document Rajat Scale and appropriate interventions in the flowsheet.   Outcome: Progressing Towards Goal  Note: Pressure Injury Interventions:  Sensory Interventions: Minimize linen layers, Keep linens dry and wrinkle-free, Monitor skin under medical devices    Moisture Interventions: Absorbent underpads, Internal/External urinary devices    Activity Interventions: PT/OT evaluation    Mobility Interventions: HOB 30 degrees or less    Nutrition Interventions: Document food/fluid/supplement intake    Friction and Shear Interventions: Foam dressings/transparent film/skin sealants, Minimize layers

## 2020-11-14 NOTE — PROGRESS NOTES
Infectious Disease Progress Note               Subjective:   Pt seen on f/u for Dr Byron Velasquez. She is being followed by ID for sepsis due to suspected UTI and aspiration PNA. She is non-verbal and unable to provide any subjective history. Pt RN no acute events in the past 24 hours. She is afebrile, hemodynamically stable and maintaining O2 sats on RA. Objective:   Physical Exam:     Visit Vitals  /77 (BP 1 Location: Left arm, BP Patient Position: At rest)   Pulse 75   Temp 98.8 °F (37.1 °C)   Resp 16   Ht 5' (1.524 m)   Wt 51.7 kg (114 lb)   SpO2 94%   Breastfeeding No   BMI 22.26 kg/m²    O2 Flow Rate (L/min): 1.5 l/min O2 Device: Room air    Temp (24hrs), Av.8 °F (37.1 °C), Min:97.8 °F (36.6 °C), Max:99.2 °F (37.3 °C)    701 - 1900  In: -   Out:  [Urine:]   1901 - 700  In: 8180   Out: 5975 [Urine:2600]    General: NAD, alert non-verbal   HEENT: SANJAY, Moist mucosa, + NGT, blank stare   Lungs: Decreased at the bases, no wheeze/rhonchi   Heart: S1S2+, RRR, no murmur  Abdo: Soft, NT, ND, +BS   Exts: No edema, + pulses b/l   Skin: No obvious infected wounds or ulcers       Data Review:       Recent Days:  Recent Labs     20  11120  1123 20  0430   WBC 9.6 6.4 8.8   HGB 11.5 11.1* 11.1*   HCT 36.7 35.3 35.7    174 188     Recent Labs     20  1112 20  1115 20  0430   BUN 10  10 12 19   CREA 0.73  0.69 0.67 0.89       Lab Results   Component Value Date/Time    C-Reactive protein 1.43 (H) 2020 11:23 AM      Microbiology     - Blood Cx : No growth x 4 days   - Urine Cx : No growth   - MRSA screen : Neg     Diagnostics   CXR Results  (Last 48 hours)    None          Assessment/Plan     1.  Sepsis due to suspected UTI and aspiration PNA      Febrile w mild leukocytosis and elevated inflammatory markers on admission       Currently afebrile w a normal WBC on routine labs       Blood and urine Cxs done on current admit at neg. MRSA screen neg       Continue on Empiric Zosyn, will d/c Vanc. Routine labs in the morning     2. Suspected UTI: abnormal UA, Cx neg     3. Pt at risk for aspiration: NGT in place, ? Need for peg     4.  H/o seizure d/o: Controlled, witnessed break through seizure since admission     Srini Gates MD    11/14/2020

## 2020-11-14 NOTE — PROGRESS NOTES
Consult for Vancomycin Dosing by Pharmacy by Dr. Roopa Whitt provided for this 35y.o. year old female , for indication of UTI    Day of Therapy: 4  Goal of Level(s): 15-20mcg/dL      Significant Cultures:       Date                             Culture                                       Organism                          Blood x1                                     Pending/NEG           Serum Creatinine Creatinine   Date Value Ref Range Status   11/13/2020 0.67 0.55 - 1.02 mg/dL Final   11/12/2020 0.89 0.55 - 1.02 mg/dL Final   11/11/2020 1.39 (H) 0.55 - 1.02 mg/dL Final      Creatinine Clearance Estimated Creatinine Clearance: 85.2 mL/min (by C-G formula based on SCr of 0.67 mg/dL). BUN Lab Results   Component Value Date/Time    BUN 12 11/13/2020 11:15 AM      WBC Lab Results   Component Value Date/Time    WBC 6.4 11/13/2020 11:23 AM      Temp 99.1 °F (37.3 °C)     Last Level:   Vancomycin,trough   Date Value Ref Range Status   11/13/2020 14.6 (H) 5.0 - 10.0 ug/mL Final     Comment:        Trough levels of 15-20 ug/mL should be targeted for patients with coagulase negative Staphylococcus and MRSA pneumonia, endocarditis,osteomyelitis, meningitis, and bacteremia, as well as patients not responding to lower levels. Trough levels of 10-15 ug/mL for infections from other sources (e.g. urinary tract, cellulitis) are appropriate. All patients receiving concomitant nephrotoxic therapies should have their function closely monitored regardless of peak or trough levels. No results found for: VANCR    Ht Readings from Last 1 Encounters:   11/09/20 152.4 cm (60\")        Wt Readings from Last 1 Encounters:   11/09/20 51.7 kg (114 lb)     Ideal body weight: 45.5 kg (100 lb 4.9 oz)  Adjusted ideal body weight: 48 kg (105 lb 12.6 oz)     Previous Regimen: 750 mg IV q8h     Regimen:   Continue Vancomycin 750 mg IV q8h. Draw Vancomycin trough level 11/15 at 1200.        Pharmacy to follow daily and will make changes to dose and/or frequency based on clinical status.     _________________________________     Pharmacist Alina Kern PHARMD

## 2020-11-14 NOTE — PROGRESS NOTES
Pt noted with a high gastric residual of 120cc- MD notified with orders below  Dextrose 5% @75  Reglan 5mg q6  Pause feeds overnight-

## 2020-11-14 NOTE — PROGRESS NOTES
Non blancheable  Redness noted to patient's coccyx- Scar noted to chin- No other skin issues noted on exam.  Primary Nurse Karol Tinajero RN and Brennan Gilliland RN performed a dual skin assessment on this patient -  Preventative plan ongoing- Will also order a p500-

## 2020-11-15 PROCEDURE — 65270000029 HC RM PRIVATE

## 2020-11-15 PROCEDURE — 74011250636 HC RX REV CODE- 250/636: Performed by: FAMILY MEDICINE

## 2020-11-15 PROCEDURE — 74011250637 HC RX REV CODE- 250/637: Performed by: FAMILY MEDICINE

## 2020-11-15 PROCEDURE — 99232 SBSQ HOSP IP/OBS MODERATE 35: CPT | Performed by: INTERNAL MEDICINE

## 2020-11-15 PROCEDURE — 74011000258 HC RX REV CODE- 258: Performed by: FAMILY MEDICINE

## 2020-11-15 PROCEDURE — 74011250637 HC RX REV CODE- 250/637: Performed by: INTERNAL MEDICINE

## 2020-11-15 PROCEDURE — 74011250636 HC RX REV CODE- 250/636: Performed by: INTERNAL MEDICINE

## 2020-11-15 PROCEDURE — 74011250637 HC RX REV CODE- 250/637: Performed by: PSYCHIATRY & NEUROLOGY

## 2020-11-15 RX ADMIN — LACOSAMIDE 50 MG: 10 SOLUTION ORAL at 08:34

## 2020-11-15 RX ADMIN — LEVETIRACETAM 1500 MG: 500 SOLUTION ORAL at 20:07

## 2020-11-15 RX ADMIN — TOPIRAMATE 200 MG: 100 TABLET, FILM COATED ORAL at 20:07

## 2020-11-15 RX ADMIN — METOCLOPRAMIDE HYDROCHLORIDE 5 MG: 5 INJECTION INTRAMUSCULAR; INTRAVENOUS at 16:05

## 2020-11-15 RX ADMIN — METOCLOPRAMIDE HYDROCHLORIDE 5 MG: 5 INJECTION INTRAMUSCULAR; INTRAVENOUS at 03:38

## 2020-11-15 RX ADMIN — PIPERACILLIN AND TAZOBACTAM 3.38 G: 3; .375 INJECTION, POWDER, LYOPHILIZED, FOR SOLUTION INTRAVENOUS at 08:33

## 2020-11-15 RX ADMIN — TOPIRAMATE 400 MG: 100 TABLET, FILM COATED ORAL at 08:33

## 2020-11-15 RX ADMIN — ENOXAPARIN SODIUM 40 MG: 40 INJECTION SUBCUTANEOUS at 08:33

## 2020-11-15 RX ADMIN — LACOSAMIDE 50 MG: 10 SOLUTION ORAL at 20:07

## 2020-11-15 RX ADMIN — POTASSIUM CHLORIDE 40 MEQ: 1.5 FOR SOLUTION ORAL at 08:34

## 2020-11-15 RX ADMIN — PIPERACILLIN AND TAZOBACTAM 3.38 G: 3; .375 INJECTION, POWDER, LYOPHILIZED, FOR SOLUTION INTRAVENOUS at 00:46

## 2020-11-15 RX ADMIN — PIPERACILLIN AND TAZOBACTAM 3.38 G: 3; .375 INJECTION, POWDER, LYOPHILIZED, FOR SOLUTION INTRAVENOUS at 16:05

## 2020-11-15 RX ADMIN — LEVETIRACETAM 1500 MG: 500 SOLUTION ORAL at 08:33

## 2020-11-15 RX ADMIN — METOCLOPRAMIDE HYDROCHLORIDE 5 MG: 5 INJECTION INTRAMUSCULAR; INTRAVENOUS at 08:33

## 2020-11-15 RX ADMIN — METOCLOPRAMIDE HYDROCHLORIDE 5 MG: 5 INJECTION INTRAMUSCULAR; INTRAVENOUS at 21:00

## 2020-11-15 NOTE — PROGRESS NOTES
General Daily Progress Note          Patient Name:   Inga Narvaez       YOB: 1990       Age:  35 y.o. Admit Date: 11/9/2020      Subjective:         No further seizures  Patient getting NG tube feeding  Open eyes               Objective:     Visit Vitals  /78 (BP 1 Location: Left arm, BP Patient Position: At rest)   Pulse 90   Temp 97.9 °F (36.6 °C)   Resp 18   Ht 5' (1.524 m)   Wt 51.7 kg (114 lb)   SpO2 100%   Breastfeeding No   BMI 22.26 kg/m²        Recent Results (from the past 24 hour(s))   GLUCOSE, POC    Collection Time: 11/14/20  8:54 PM   Result Value Ref Range    Glucose (POC) 108 (H) 65 - 100 mg/dL    Performed by [facility]      [unfilled]      Review of Systems    Nonverbal      Physical Exam:      Constitutional not in distress   head: Normocephalic and atraumatic. Eyes: Pupils are equal, round, and reactive to light. EOM are normal.   Cardiovascular: Normal rate, regular rhythm and normal heart sounds. Pulmonary/Chest: Breath sounds normal. No wheezes. No rales. Exhibits no tenderness. Abdominal: Soft. Bowel sounds are normal. There is no abdominal tenderness. There is no rebound and no guarding. Musculoskeletal: Normal range of motion. Neurological: Moves all extremities  XR CHEST PORT   Final Result      XR CHEST PORT   Final Result      XR CHEST PORT   Final Result   IMPRESSION: Underexpanded lungs. No focal infiltrate. No overt edema.            Recent Results (from the past 24 hour(s))   GLUCOSE, POC    Collection Time: 11/14/20  8:54 PM   Result Value Ref Range    Glucose (POC) 108 (H) 65 - 100 mg/dL    Performed by Dorothy Dumas        Results     Procedure Component Value Units Date/Time    CULTURE, URINE [[id number]] Collected:  11/11/20 1600    Order Status:  Completed Specimen:  Urine Updated:  11/13/20 0856     Special Requests: No Special Requests        Culture result: No Growth (<1000 cfu/mL)       MRSA SCREEN - PCR (NASAL) [[id number]] Collected: 11/11/20 0530    Order Status:  Completed Specimen:  Swab Updated:  11/11/20 0831     MRSA by PCR, Nasal Not Detected       CULTURE, BLOOD #1 [[id number]] Collected:  11/09/20 1745    Order Status:  Canceled Specimen:  Blood     CULTURE, BLOOD #2 [[id number]] Collected:  11/09/20 1745    Order Status:  Canceled Specimen:  Blood     CULTURE, BLOOD, PAIRED [[id number]] Collected:  11/09/20 0900    Order Status:  Completed Specimen:  Blood Updated:  11/13/20 0948     Special Requests: No Special Requests        Culture result: No growth 4 days              Labs:     Recent Labs     11/14/20  1112 11/13/20  1123   WBC 9.6 6.4   HGB 11.5 11.1*   HCT 36.7 35.3    174     Recent Labs     11/14/20  1112 11/13/20  1200 11/13/20  1115   *  142  --  146*   K 4.5  4.4  --  3.7   *  117*  --  120*   CO2 19*  20*  --  22   BUN 10  10  --  12   CREA 0.73  0.69  --  0.67   *  112*  --  136*   CA 8.7  8.8  --  8.3*   MG  --  2.0  --    PHOS 2.7  --  2.6     Recent Labs     11/14/20  1112 11/13/20  1115   ALT 93*  --    AP 47  --    TBILI 0.4  --    TP 6.4  --    ALB 2.7*  2.7* 2.4*   GLOB 3.7  --      No results for input(s): INR, PTP, APTT, INREXT, INREXT in the last 72 hours. No results for input(s): FE, TIBC, PSAT, FERR in the last 72 hours. No results found for: FOL, RBCF   No results for input(s): PH, PCO2, PO2 in the last 72 hours. No results for input(s): CPK, CKNDX, TROIQ in the last 72 hours.     No lab exists for component: CPKMB  No results found for: CHOL, CHOLX, CHLST, CHOLV, HDL, HDLP, LDL, LDLC, DLDLP, TGLX, TRIGL, TRIGP, CHHD, CHHDX  Lab Results   Component Value Date/Time    Glucose (POC) 108 (H) 11/14/2020 08:54 PM    Glucose (POC) 121 (H) 11/14/2020 12:06 PM    Glucose (POC) 105 (H) 11/14/2020 07:42 AM    Glucose (POC) 102 (H) 11/14/2020 04:10 AM    Glucose (POC) 84 11/11/2020 05:34 PM     Lab Results   Component Value Date/Time    Color Yellow/Straw 11/11/2020 06:00 PM Appearance Clear 11/11/2020 06:00 PM    Specific gravity 1.028 11/11/2020 06:00 PM    pH (UA) 5.0 11/11/2020 06:00 PM    Protein 30 (A) 11/11/2020 06:00 PM    Glucose Negative 11/11/2020 06:00 PM    Ketone 80 (A) 11/11/2020 06:00 PM    Bilirubin Negative 11/11/2020 06:00 PM    Urobilinogen 0.1 11/11/2020 06:00 PM    Nitrites Negative 11/11/2020 06:00 PM    Leukocyte Esterase Small (A) 11/11/2020 06:00 PM    Bacteria Negative 11/11/2020 06:00 PM    WBC 20-50 11/11/2020 06:00 PM    RBC 5-10 11/11/2020 06:00 PM         Assessment:   Recurrent seizures  Sepsis  Complicated UTI  History of Seizures  Angelman Syndrome  Lactic acidosis  Metabolic acidosis  Hypernatremia  NG-tube in place  Hypokalemia      Plan:     Continue Zosyn/vancomycin  Continue Topamax  Continue Microgestin  Continue Keppra 1500 twice daily  Seen by the neurology started on Vimpat    Follow-up sodium level    Medication was reviewed with patient's father by the nurse  Follow-up with the neurology  Follow-up the cultures  Rep;ace  Potassium  Half-normal saline with 20 of KCl 75 cc/h as per nephrology  Continue NG tube feeding    Today labs are pending  Discussed with the patient's sister Ayleen Ibarra  Her phone number is [phone]          Current Facility-Administered Medications:     metoclopramide HCl (REGLAN) injection 5 mg, 5 mg, IntraVENous, Q6H, Rosalio Chi MD, 5 mg at 11/15/20 1605    lacosamide (VIMPAT) oral solution 50 mg, 50 mg, Per G Tube, BID, Chelsea Echols MD, 50 mg at 11/15/20 0834    levETIRAcetam (KEPPRA) oral solution 1,500 mg, 1,500 mg, Per NG tube, BID, Melanie Martin MD, 1,500 mg at 11/15/20 0833    potassium chloride (KLOR-CON) packet for solution 40 mEq, 40 mEq, Per NG tube, DAILY, Allen Pope MD, 40 mEq at 11/15/20 0834    acetaminophen (TYLENOL) tablet 650 mg, 650 mg, Oral, Q4H PRN, 650 mg at 11/14/20 2032 **OR** acetaminophen (TYLENOL) suppository 650 mg, 650 mg, Rectal, Q4H PRN, Unique Carpio MD, 650 mg at 11/11/20 1254    piperacillin-tazobactam (ZOSYN) 3.375 g in 0.9% sodium chloride (MBP/ADV) 100 mL MBP, 3.375 g, IntraVENous, Q8H, Unique Carpio MD, Last Rate: 25 mL/hr at 11/15/20 1605, 3.375 g at 11/15/20 1605    norethindrone-ethinyl estradiol (MICROGESTIN 1/20) 1-20 mg-mcg tablet 1 Tab (Patient Supplied), 1 Tab, Oral, DAILY, Unique Carpio MD, Stopped at 11/10/20 1400    LORazepam (ATIVAN) injection 1 mg, 1 mg, IntraVENous, Q2H PRN, Unique Carpio MD, 1 mg at 11/12/20 4354    polyethylene glycol (MIRALAX) packet 17 g, 17 g, Oral, DAILY PRN, Erika Carpio MD    ondansetron (ZOFRAN ODT) tablet 4 mg, 4 mg, Oral, Q8H PRN **OR** ondansetron (ZOFRAN) injection 4 mg, 4 mg, IntraVENous, Q6H PRN, Unique Carpio MD, 4 mg at 11/14/20 1026    enoxaparin (LOVENOX) injection 40 mg, 40 mg, SubCUTAneous, DAILY, Unique Carpio MD, 40 mg at 11/15/20 4435    topiramate (TOPAMAX) tablet 400 mg, 400 mg, Oral, DAILY WITH BREAKFAST, Unique Carpio MD, 400 mg at 11/15/20 2116    topiramate (TOPAMAX) tablet 200 mg, 200 mg, Oral, QHS, Unique Carpio MD, 200 mg at 11/14/20 2033

## 2020-11-15 NOTE — PROGRESS NOTES
Pt residual relatively lower than prior @60cc- Md contacted with following orders-    Cont feeds @30cc an hour  Fluids @100cc q4  And d/c dextrose-

## 2020-11-15 NOTE — PROGRESS NOTES
Infectious Disease Progress Note               Subjective:   Pt seen and examined at bedside. Remains stable, no acute events since last seen. Objective:   Physical Exam:     Visit Vitals  /78 (BP 1 Location: Left arm, BP Patient Position: At rest)   Pulse 90   Temp 97.9 °F (36.6 °C)   Resp 18   Ht 5' (1.524 m)   Wt 51.7 kg (114 lb)   SpO2 100%   Breastfeeding No   BMI 22.26 kg/m²    O2 Flow Rate (L/min): 1.5 l/min O2 Device: Room air    Temp (24hrs), Av.5 °F (36.9 °C), Min:97.6 °F (36.4 °C), Max:99.3 °F (37.4 °C)    11/15 0701 - 11/15 1900  In: 160   Out: 2100 [Urine:2100]   1901 - 11/15 0700  In: 1010   Out: 7019 [Urine:4550]    General: NAD, alert non-verbal   HEENT: SANJAY, Moist mucosa, + NGT, blank stare   Lungs: Decreased at the bases, no wheeze/rhonchi   Heart: S1S2+, RRR, no murmur  Abdo: Soft, NT, ND, +BS   Exts: No edema, + pulses b/l   Skin: No obvious infected wounds or ulcers       Data Review:       Recent Days:  Recent Labs     20  1112 20  1123   WBC 9.6 6.4   HGB 11.5 11.1*   HCT 36.7 35.3    174     Recent Labs     20  1112 20  1115   BUN 10  10 12   CREA 0.73  0.69 0.67       Lab Results   Component Value Date/Time    C-Reactive protein 1.43 (H) 2020 11:23 AM      Microbiology     - Blood Cx : No growth x 4 days   - Urine Cx : No growth   - MRSA screen : Neg     Diagnostics   CXR Results  (Last 48 hours)    None          Assessment/Plan     1. Sepsis due to suspected UTI and aspiration PNA      Routine labs not done today, Tmax of 99.3F in the past 24 hours       Blood ()  and urine () Cxs are neg       Continue empiric Zosyn for now, Vanc discontinued on       CBC, CRP and procal in AM     2. Suspected UTI: abnormal UA, Cx from  is neg     3. Pt at risk for aspiration: NGT in place, ? Need for peg     4.  H/o seizure d/o: Controlled no witnessed seizure since admit     Lety Wilson, MD    11/15/2020

## 2020-11-16 LAB
BACTERIA SPEC CULT: NORMAL
BASOPHILS # BLD: 0 K/UL (ref 0–0.1)
BASOPHILS NFR BLD: 0 % (ref 0–1)
CRP SERPL-MCNC: 1.16 MG/DL (ref 0–0.6)
DIFFERENTIAL METHOD BLD: NORMAL
EOSINOPHIL # BLD: 0.2 K/UL (ref 0–0.4)
EOSINOPHIL NFR BLD: 2 % (ref 0–7)
ERYTHROCYTE [DISTWIDTH] IN BLOOD BY AUTOMATED COUNT: 13.5 % (ref 11.5–14.5)
GLUCOSE BLD STRIP.AUTO-MCNC: 100 MG/DL (ref 65–100)
GLUCOSE BLD STRIP.AUTO-MCNC: 84 MG/DL (ref 65–100)
GLUCOSE BLD STRIP.AUTO-MCNC: 88 MG/DL (ref 65–100)
HCT VFR BLD AUTO: 40.4 % (ref 35–47)
HGB BLD-MCNC: 12.9 G/DL (ref 11.5–16)
IMM GRANULOCYTES # BLD AUTO: 0 K/UL (ref 0–0.04)
IMM GRANULOCYTES NFR BLD AUTO: 0 % (ref 0–0.5)
LYMPHOCYTES # BLD: 1.3 K/UL (ref 0.8–3.5)
LYMPHOCYTES NFR BLD: 14 % (ref 12–49)
MCH RBC QN AUTO: 30.5 PG (ref 26–34)
MCHC RBC AUTO-ENTMCNC: 31.9 G/DL (ref 30–36.5)
MCV RBC AUTO: 95.5 FL (ref 80–99)
MONOCYTES # BLD: 0.8 K/UL (ref 0–1)
MONOCYTES NFR BLD: 9 % (ref 5–13)
NEUTS SEG # BLD: 6.7 K/UL (ref 1.8–8)
NEUTS SEG NFR BLD: 75 % (ref 32–75)
PERFORMED BY, TECHID: NORMAL
PLATELET # BLD AUTO: 339 K/UL (ref 150–400)
PMV BLD AUTO: 11.5 FL (ref 8.9–12.9)
PROCALCITONIN SERPL-MCNC: 0.1 NG/ML
RBC # BLD AUTO: 4.23 M/UL (ref 3.8–5.2)
SPECIAL REQUESTS,SREQ: NORMAL
WBC # BLD AUTO: 9 K/UL (ref 3.6–11)

## 2020-11-16 PROCEDURE — 74011250637 HC RX REV CODE- 250/637: Performed by: FAMILY MEDICINE

## 2020-11-16 PROCEDURE — 36415 COLL VENOUS BLD VENIPUNCTURE: CPT

## 2020-11-16 PROCEDURE — 65270000029 HC RM PRIVATE

## 2020-11-16 PROCEDURE — 74011250636 HC RX REV CODE- 250/636: Performed by: FAMILY MEDICINE

## 2020-11-16 PROCEDURE — 82962 GLUCOSE BLOOD TEST: CPT

## 2020-11-16 PROCEDURE — 74011250637 HC RX REV CODE- 250/637: Performed by: PSYCHIATRY & NEUROLOGY

## 2020-11-16 PROCEDURE — 85025 COMPLETE CBC W/AUTO DIFF WBC: CPT

## 2020-11-16 PROCEDURE — 74011000258 HC RX REV CODE- 258: Performed by: FAMILY MEDICINE

## 2020-11-16 PROCEDURE — 74011250637 HC RX REV CODE- 250/637: Performed by: INTERNAL MEDICINE

## 2020-11-16 PROCEDURE — 74011250636 HC RX REV CODE- 250/636: Performed by: INTERNAL MEDICINE

## 2020-11-16 PROCEDURE — 84145 PROCALCITONIN (PCT): CPT

## 2020-11-16 PROCEDURE — 99232 SBSQ HOSP IP/OBS MODERATE 35: CPT | Performed by: INTERNAL MEDICINE

## 2020-11-16 PROCEDURE — 86140 C-REACTIVE PROTEIN: CPT

## 2020-11-16 RX ADMIN — LEVETIRACETAM 1500 MG: 500 SOLUTION ORAL at 22:40

## 2020-11-16 RX ADMIN — ENOXAPARIN SODIUM 40 MG: 40 INJECTION SUBCUTANEOUS at 10:00

## 2020-11-16 RX ADMIN — NORETHINDRONE ACETATE AND ETHINYL ESTRADIOL 1 TABLET: 1; .02 TABLET ORAL at 11:00

## 2020-11-16 RX ADMIN — PIPERACILLIN AND TAZOBACTAM 3.38 G: 3; .375 INJECTION, POWDER, LYOPHILIZED, FOR SOLUTION INTRAVENOUS at 16:32

## 2020-11-16 RX ADMIN — METOCLOPRAMIDE HYDROCHLORIDE 5 MG: 5 INJECTION INTRAMUSCULAR; INTRAVENOUS at 22:41

## 2020-11-16 RX ADMIN — POTASSIUM CHLORIDE 40 MEQ: 1.5 FOR SOLUTION ORAL at 10:01

## 2020-11-16 RX ADMIN — METOCLOPRAMIDE HYDROCHLORIDE 5 MG: 5 INJECTION INTRAMUSCULAR; INTRAVENOUS at 03:14

## 2020-11-16 RX ADMIN — TOPIRAMATE 200 MG: 100 TABLET, FILM COATED ORAL at 22:45

## 2020-11-16 RX ADMIN — METOCLOPRAMIDE HYDROCHLORIDE 5 MG: 5 INJECTION INTRAMUSCULAR; INTRAVENOUS at 10:00

## 2020-11-16 RX ADMIN — LEVETIRACETAM 1500 MG: 500 SOLUTION ORAL at 10:00

## 2020-11-16 RX ADMIN — TOPIRAMATE 400 MG: 100 TABLET, FILM COATED ORAL at 10:00

## 2020-11-16 RX ADMIN — PIPERACILLIN AND TAZOBACTAM 3.38 G: 3; .375 INJECTION, POWDER, LYOPHILIZED, FOR SOLUTION INTRAVENOUS at 10:00

## 2020-11-16 RX ADMIN — LACOSAMIDE 50 MG: 10 SOLUTION ORAL at 09:00

## 2020-11-16 RX ADMIN — METOCLOPRAMIDE HYDROCHLORIDE 5 MG: 5 INJECTION INTRAMUSCULAR; INTRAVENOUS at 16:32

## 2020-11-16 RX ADMIN — LACOSAMIDE 50 MG: 10 SOLUTION ORAL at 22:44

## 2020-11-16 RX ADMIN — PIPERACILLIN AND TAZOBACTAM 3.38 G: 3; .375 INJECTION, POWDER, LYOPHILIZED, FOR SOLUTION INTRAVENOUS at 00:55

## 2020-11-16 NOTE — PROGRESS NOTES
Consult received and bedside sw eval attempted. Pt could not be adequately aroused to participate in bedside sw eval despite repositioning, verbal and tactile prompts. Nsg report pt tolerated puree/nectar diet last evening witih nsg. Nsg is aware not to feed pt unless awake and alert. Pt is typically on regular/thin diet at home prior to current admission. Will attempt eval at later time/date as pt able to participate. 1406: spoke with nsg, pt remains poorly arousable. nsg reports pt accepted only min amount of breakfast meal this date, tolerated meds without overt s/s aspiration. Will plan to f/u 11- and complete assessment if pt able to participate.

## 2020-11-16 NOTE — PROGRESS NOTES
General Daily Progress Note          Patient Name:   Richar Gilliland       YOB: 1990       Age:  35 y.o. Admit Date: 11/9/2020      Subjective:         Patient resting in the bed no seizure reported           Objective:     Visit Vitals  /66 (BP 1 Location: Left arm, BP Patient Position: At rest)   Pulse 72   Temp 97.7 °F (36.5 °C)   Resp 17   Ht 5' (1.524 m)   Wt 51.7 kg (114 lb)   SpO2 97%   Breastfeeding No   BMI 22.26 kg/m²        Recent Results (from the past 24 hour(s))   GLUCOSE, POC    Collection Time: 11/16/20  8:29 AM   Result Value Ref Range    Glucose (POC) 84 65 - 100 mg/dL    Performed by Malinda Rojas      [unfilled]      Review of Systems    Nonverbal      Physical Exam:      Constitutional not in distress   head: Normocephalic and atraumatic. Eyes: Pupils are equal, round, and reactive to light. EOM are normal.   Cardiovascular: Normal rate, regular rhythm and normal heart sounds. Pulmonary/Chest: Breath sounds normal. No wheezes. No rales. Exhibits no tenderness. Abdominal: Soft. Bowel sounds are normal. There is no abdominal tenderness. There is no rebound and no guarding. Musculoskeletal: Normal range of motion. Neurological: Moves all extremities  XR CHEST PORT   Final Result      XR CHEST PORT   Final Result      XR CHEST PORT   Final Result   IMPRESSION: Underexpanded lungs. No focal infiltrate. No overt edema.            Recent Results (from the past 24 hour(s))   GLUCOSE, POC    Collection Time: 11/16/20  8:29 AM   Result Value Ref Range    Glucose (POC) 84 65 - 100 mg/dL    Performed by Malinda Rojas        Results     Procedure Component Value Units Date/Time    CULTURE, URINE [[id number]] Collected:  11/11/20 1600    Order Status:  Completed Specimen:  Urine Updated:  11/13/20 0856     Special Requests: No Special Requests        Culture result: No Growth (<1000 cfu/mL)       MRSA SCREEN - PCR (NASAL) [[id number]] Collected:  11/11/20 0530    Order Status: Completed Specimen:  Swab Updated:  11/11/20 0831     MRSA by PCR, Nasal Not Detected       CULTURE, BLOOD #1 [[id number]] Collected:  11/09/20 1745    Order Status:  Canceled Specimen:  Blood     CULTURE, BLOOD #2 [[id number]] Collected:  11/09/20 1745    Order Status:  Canceled Specimen:  Blood     CULTURE, BLOOD, PAIRED [[id number]] Collected:  11/09/20 0900    Order Status:  Completed Specimen:  Blood Updated:  11/16/20 0817     Special Requests: No Special Requests        Culture result: No growth 7 days              Labs:     Recent Labs     11/14/20  1112 11/13/20  1123   WBC 9.6 6.4   HGB 11.5 11.1*   HCT 36.7 35.3    174     Recent Labs     11/14/20  1112 11/13/20  1200 11/13/20  1115   *  142  --  146*   K 4.5  4.4  --  3.7   *  117*  --  120*   CO2 19*  20*  --  22   BUN 10  10  --  12   CREA 0.73  0.69  --  0.67   *  112*  --  136*   CA 8.7  8.8  --  8.3*   MG  --  2.0  --    PHOS 2.7  --  2.6     Recent Labs     11/14/20  1112 11/13/20  1115   ALT 93*  --    AP 47  --    TBILI 0.4  --    TP 6.4  --    ALB 2.7*  2.7* 2.4*   GLOB 3.7  --      No results for input(s): INR, PTP, APTT, INREXT, INREXT in the last 72 hours. No results for input(s): FE, TIBC, PSAT, FERR in the last 72 hours. No results found for: FOL, RBCF   No results for input(s): PH, PCO2, PO2 in the last 72 hours. No results for input(s): CPK, CKNDX, TROIQ in the last 72 hours.     No lab exists for component: CPKMB  No results found for: CHOL, CHOLX, CHLST, CHOLV, HDL, HDLP, LDL, LDLC, DLDLP, TGLX, TRIGL, TRIGP, CHHD, CHHDX  Lab Results   Component Value Date/Time    Glucose (POC) 84 11/16/2020 08:29 AM    Glucose (POC) 108 (H) 11/14/2020 08:54 PM    Glucose (POC) 121 (H) 11/14/2020 12:06 PM    Glucose (POC) 105 (H) 11/14/2020 07:42 AM    Glucose (POC) 102 (H) 11/14/2020 04:10 AM     Lab Results   Component Value Date/Time    Color Yellow/Straw 11/11/2020 06:00 PM    Appearance Clear 11/11/2020 06:00 PM Specific gravity 1.028 11/11/2020 06:00 PM    pH (UA) 5.0 11/11/2020 06:00 PM    Protein 30 (A) 11/11/2020 06:00 PM    Glucose Negative 11/11/2020 06:00 PM    Ketone 80 (A) 11/11/2020 06:00 PM    Bilirubin Negative 11/11/2020 06:00 PM    Urobilinogen 0.1 11/11/2020 06:00 PM    Nitrites Negative 11/11/2020 06:00 PM    Leukocyte Esterase Small (A) 11/11/2020 06:00 PM    Bacteria Negative 11/11/2020 06:00 PM    WBC 20-50 11/11/2020 06:00 PM    RBC 5-10 11/11/2020 06:00 PM         Assessment:   Recurrent seizures  Sepsis  Complicated UTI  History of Seizures  Angelman Syndrome  Lactic acidosis  Metabolic acidosis  Hypernatremia  NG-tube in place  Hypokalemia      Plan:     Continue Zosyn/vancomycin  Continue Topamax  Continue Microgestin  Continue Keppra 1500 twice daily  Seen by the neurology started on Vimpat    Follow-up sodium level    Medication was reviewed with patient's father by the nurse  Follow-up with the neurology  Follow-up the cultures  Rep;ace  Potassium  Half-normal saline with 20 of KCl 75 cc/h as per nephrology  PT OT consult    Labs pending  Her phone number is [phone]          Current Facility-Administered Medications:     metoclopramide HCl (REGLAN) injection 5 mg, 5 mg, IntraVENous, Q6H, Rosalio Chi MD, 5 mg at 11/16/20 1000    lacosamide (VIMPAT) oral solution 50 mg, 50 mg, Per G Tube, BID, Baron Jennifer MD, 50 mg at 11/16/20 0900    levETIRAcetam (KEPPRA) oral solution 1,500 mg, 1,500 mg, Per NG tube, BID, Baron Jennifer MD, 1,500 mg at 11/16/20 1000    acetaminophen (TYLENOL) tablet 650 mg, 650 mg, Oral, Q4H PRN, 650 mg at 11/14/20 2032 **OR** acetaminophen (TYLENOL) suppository 650 mg, 650 mg, Rectal, Q4H PRN, Unique Carpio MD, 650 mg at 11/11/20 1254    piperacillin-tazobactam (ZOSYN) 3.375 g in 0.9% sodium chloride (MBP/ADV) 100 mL MBP, 3.375 g, IntraVENous, Q8H, Unique Carpio MD, Last Rate: 25 mL/hr at 11/16/20 1000, 3.375 g at 11/16/20 1000   norethindrone-ethinyl estradiol (MICROGESTIN 1/20) 1-20 mg-mcg tablet 1 Tab (Patient Supplied), 1 Tab, Oral, DAILY, Unique Carpio MD, Stopped at 11/10/20 1400    LORazepam (ATIVAN) injection 1 mg, 1 mg, IntraVENous, Q2H PRN, Unique Carpio MD, 1 mg at 11/12/20 9710    polyethylene glycol (MIRALAX) packet 17 g, 17 g, Oral, DAILY PRN, Yaquelin Carpio MD    ondansetron (ZOFRAN ODT) tablet 4 mg, 4 mg, Oral, Q8H PRN **OR** ondansetron (ZOFRAN) injection 4 mg, 4 mg, IntraVENous, Q6H PRN, Unique Carpio MD, 4 mg at 11/14/20 1026    enoxaparin (LOVENOX) injection 40 mg, 40 mg, SubCUTAneous, DAILY, Unique Carpio MD, 40 mg at 11/16/20 1000    topiramate (TOPAMAX) tablet 400 mg, 400 mg, Oral, DAILY WITH BREAKFAST, Unique Carpio MD, 400 mg at 11/16/20 1000    topiramate (TOPAMAX) tablet 200 mg, 200 mg, Oral, QHS, Unique Carpio MD, 200 mg at 11/15/20 2007

## 2020-11-16 NOTE — PROGRESS NOTES
NEURO PROGRESS NOTE        SUBJECTIVE:   Seizures, UTI, metabolic abnormalities,      EXAM:  Awake, reacts verbally  No seizures reported  No new focality      ASSESSMENT/PLAN:  Continue current antiepileptic medications    ALLERGIES:    No Known Allergies    MEDS:      Current Facility-Administered Medications:     metoclopramide HCl (REGLAN) injection 5 mg, 5 mg, IntraVENous, Q6H, Rosalio Chi MD, 5 mg at 11/16/20 0314    lacosamide (VIMPAT) oral solution 50 mg, 50 mg, Per G Tube, BID, Mk Byrne MD, 50 mg at 11/15/20 2007    levETIRAcetam (KEPPRA) oral solution 1,500 mg, 1,500 mg, Per NG tube, BID, Mk Byrne MD, 1,500 mg at 11/15/20 2007    potassium chloride (KLOR-CON) packet for solution 40 mEq, 40 mEq, Per NG tube, DAILY, Allen Pope MD, 40 mEq at 11/15/20 0834    acetaminophen (TYLENOL) tablet 650 mg, 650 mg, Oral, Q4H PRN, 650 mg at 11/14/20 2032 **OR** acetaminophen (TYLENOL) suppository 650 mg, 650 mg, Rectal, Q4H PRN, Unique Carpio MD, 650 mg at 11/11/20 1254    piperacillin-tazobactam (ZOSYN) 3.375 g in 0.9% sodium chloride (MBP/ADV) 100 mL MBP, 3.375 g, IntraVENous, Q8H, Unique Carpio MD, Last Rate: 25 mL/hr at 11/16/20 0055, 3.375 g at 11/16/20 0055    norethindrone-ethinyl estradiol (MICROGESTIN 1/20) 1-20 mg-mcg tablet 1 Tab (Patient Supplied), 1 Tab, Oral, DAILY, Unique Carpio MD, Stopped at 11/10/20 1400    LORazepam (ATIVAN) injection 1 mg, 1 mg, IntraVENous, Q2H PRN, Unique Carpio MD, 1 mg at 11/12/20 2537    polyethylene glycol (MIRALAX) packet 17 g, 17 g, Oral, DAILY PRN, Donnie Carpio MD    ondansetron (ZOFRAN ODT) tablet 4 mg, 4 mg, Oral, Q8H PRN **OR** ondansetron (ZOFRAN) injection 4 mg, 4 mg, IntraVENous, Q6H PRN, Unique Carpio MD, 4 mg at 11/14/20 1026    enoxaparin (LOVENOX) injection 40 mg, 40 mg, SubCUTAneous, DAILY, Unique Carpio MD, 40 mg at 11/15/20 6097    topiramate (TOPAMAX) tablet 400 mg, 400 mg, Oral, DAILY WITH Vazquez Espinoza MD, 400 mg at 11/15/20 9129    topiramate (TOPAMAX) tablet 200 mg, 200 mg, Oral, QHS, Unique Carpio MD, 200 mg at 11/15/20 2007    LABS:  Recent Results (from the past 24 hour(s))   GLUCOSE, POC    Collection Time: 11/16/20  8:29 AM   Result Value Ref Range    Glucose (POC) 84 65 - 100 mg/dL    Performed by Malinda Rojas        Visit Vitals  /66 (BP 1 Location: Left arm, BP Patient Position: At rest)   Pulse 72   Temp 97.7 °F (36.5 °C)   Resp 17   Ht 5' (1.524 m)   Wt 51.7 kg (114 lb)   SpO2 97%   Breastfeeding No   BMI 22.26 kg/m²       Imaging:  XR CHEST PORT   Final Result      XR CHEST PORT   Final Result      XR CHEST PORT   Final Result   IMPRESSION: Underexpanded lungs. No focal infiltrate. No overt edema.

## 2020-11-16 NOTE — ROUTINE PROCESS
Patient pulled out NG tube last night. Dr Ulloa Spotted notified and new orders to reinsert NG tube. I was about to reinsert NG tube but patients father refused the NG tube and stated \"I would rather take her back home than put the tube back in\". I did not reinsert NG tube and  was notified that the NG tube was not re-inserted.

## 2020-11-16 NOTE — PROGRESS NOTES
Progress Note    Patient: Kristina Demarco MRN: [id number]  SSN: [id number]    YOB: 1990  Age: 35 y.o. Sex: female      Admit Date: 11/9/2020    LOS: 7 days     Subjective:   Patient followed for sepsis with suspected UTI and aspiration pneumonia. Overnight she remained febrile but WBC is now normal.  Procalcitonin and CRP are elevated. Repeat CXR still showing clear lungs. She is on Vancomycin and Zosyn. She has been transferred out of the ICU. She is awake and appears to be resting comfortably. Objective:     Vitals:    11/16/20 0328 11/16/20 0758 11/16/20 1127 11/16/20 1445   BP: 118/73 111/66 128/74 127/73   Pulse: 69 72 93    Resp: 17 17 17 17   Temp: 97.4 °F (36.3 °C) 97.7 °F (36.5 °C) 97.7 °F (36.5 °C) 97.8 °F (36.6 °C)   SpO2: 98% 97% 98% 96%   Weight:       Height:            Intake and Output:  Current Shift: No intake/output data recorded. Last three shifts: 11/14 1901 - 11/16 0700  In: 670   Out: 4100 [Urine:4100]    Physical Exam:    Constitutional:       General: She is not in acute distress. Appearance: She is ill-appearing. She is not diaphoretic. HENT:      Head: Normocephalic and atraumatic. Nose: Nose normal.      Mouth/Throat:      Pharynx: Oropharynx is clear. Eyes:      Pupils: Pupils are equal, round, and reactive to light. Neck:      Musculoskeletal: Neck supple. Cardiovascular:      Rate and Rhythm: Regular rhythm. Tachycardia present. Heart sounds: No murmur. Pulmonary:      Breath sounds: Normal breath sounds. Abdominal:      Palpations: Abdomen is soft. Tenderness: There is no abdominal tenderness. Genitourinary:     Comments: Irvin catheter  Musculoskeletal:      Right lower leg: No edema. Left lower leg: No edema. Skin:     Findings: No erythema or rash.    Neurological:      Comments: Unable to assess  No seizure activity   Psychiatric:      Comments: Unable to assess     Lab/Data Review:    WBC 9,000  Procalcitonin  0.10 <1.04  CRP 1.16 <1.43 <3.73    Blood cultures (11/9) No growth FINAL  Urine culture (11/11) No growth FINAL    Assessment:     Active Problems:    UTI (urinary tract infection) (11/9/2020)      Sepsis ([address] (11/9/2020)      AMS (altered mental status) (11/9/2020)    1. Sepsis with fever, elevated lactic acid, leukocytosis, elevated procalcitonin and CRP, Day #7 IV Zosyn. 2. Possible UTI with  pyuria, urine culture negative  3. At risk for aspiration pneumonia  4. Seizure disorder, active    Comment:  No clear source of infection. Inflammatory markers resolving. Plan:   1. Continue Zosyn  2. In am,  repeat CBC, CRP and procalcitonin, done  3. If still resolving tomorrow, would favor discontinuing Zosyn.     Signed By: Michele Monteiro MD     November 16, 2020

## 2020-11-16 NOTE — PROGRESS NOTES
CM contacted patient father Macy Valverde [phone]); received no answer and left a voice message. CM contacted patient sister Teagan Rai [phone]); received no answer and left a voice message. CM will continue to follow the patient for discharge planning needs.

## 2020-11-16 NOTE — PROGRESS NOTES
Pt noted with NG out at shift change- MD contacted with the following orders -  Swallow test- Start on dysphagia diet         Pt tolerated small sips of  mildly thick fluids-  Of note dad stated pt was on regular food at home-  Speech consulted for a formal eval and recommendation-

## 2020-11-16 NOTE — PROGRESS NOTES
Patient is nonverbal and information was provided by sister Anaya Richey [phone]). Reason for admission: Seizure, fever, vomiting    RUR: 8%    DME: None    Falling: yes, patient is a fall risk. Father and sister try to keep patient seated as often as possible. Driving: No driving    PCP: Dr. Naren Alcaraz, [place], [place]  Last appointment: about three weeks ago    POA: Delbert Green (father) [phone]    AMD: Unknown    Rehab: No past PT or OT    Patient resides in a two story house with her father Delbert Green) and sister Anaya Richey). The family resides on the ground level of the home and does not use the upstairs. Patient uses no DME, is a fall risk, and does not drive. Patient requires total care by her sister. Patient is under the care of PCP Dr. Naren Alcaraz and had last appointment three months ago. Father is POA and sister is unsure of AMD.     CM will continue to follow the patient for discharge planning needs.

## 2020-11-17 VITALS
TEMPERATURE: 97.8 F | OXYGEN SATURATION: 97 % | WEIGHT: 114 LBS | HEIGHT: 60 IN | HEART RATE: 88 BPM | SYSTOLIC BLOOD PRESSURE: 113 MMHG | BODY MASS INDEX: 22.38 KG/M2 | DIASTOLIC BLOOD PRESSURE: 77 MMHG | RESPIRATION RATE: 18 BRPM

## 2020-11-17 LAB
GLUCOSE BLD STRIP.AUTO-MCNC: 109 MG/DL (ref 65–100)
GLUCOSE BLD STRIP.AUTO-MCNC: 91 MG/DL (ref 65–100)
PERFORMED BY, TECHID: ABNORMAL
PERFORMED BY, TECHID: NORMAL

## 2020-11-17 PROCEDURE — 82962 GLUCOSE BLOOD TEST: CPT

## 2020-11-17 PROCEDURE — 97530 THERAPEUTIC ACTIVITIES: CPT

## 2020-11-17 PROCEDURE — 74011000258 HC RX REV CODE- 258: Performed by: INTERNAL MEDICINE

## 2020-11-17 PROCEDURE — 74011250637 HC RX REV CODE- 250/637: Performed by: PSYCHIATRY & NEUROLOGY

## 2020-11-17 PROCEDURE — 74011250636 HC RX REV CODE- 250/636: Performed by: INTERNAL MEDICINE

## 2020-11-17 PROCEDURE — 74011250637 HC RX REV CODE- 250/637: Performed by: FAMILY MEDICINE

## 2020-11-17 PROCEDURE — 92610 EVALUATE SWALLOWING FUNCTION: CPT

## 2020-11-17 PROCEDURE — 97161 PT EVAL LOW COMPLEX 20 MIN: CPT

## 2020-11-17 PROCEDURE — 74011250636 HC RX REV CODE- 250/636: Performed by: FAMILY MEDICINE

## 2020-11-17 PROCEDURE — 99232 SBSQ HOSP IP/OBS MODERATE 35: CPT | Performed by: INTERNAL MEDICINE

## 2020-11-17 RX ORDER — LACOSAMIDE 10 MG/ML
50 SOLUTION ORAL 2 TIMES DAILY
Qty: 500 ML | Refills: 2 | Status: SHIPPED | OUTPATIENT
Start: 2020-11-17

## 2020-11-17 RX ADMIN — PIPERACILLIN AND TAZOBACTAM 3.38 G: 3; .375 INJECTION, POWDER, LYOPHILIZED, FOR SOLUTION INTRAVENOUS at 01:25

## 2020-11-17 RX ADMIN — LACOSAMIDE 50 MG: 10 SOLUTION ORAL at 08:26

## 2020-11-17 RX ADMIN — ENOXAPARIN SODIUM 40 MG: 40 INJECTION SUBCUTANEOUS at 08:28

## 2020-11-17 RX ADMIN — METOCLOPRAMIDE HYDROCHLORIDE 5 MG: 5 INJECTION INTRAMUSCULAR; INTRAVENOUS at 15:59

## 2020-11-17 RX ADMIN — LEVETIRACETAM 1500 MG: 500 SOLUTION ORAL at 08:28

## 2020-11-17 RX ADMIN — TOPIRAMATE 400 MG: 100 TABLET, FILM COATED ORAL at 08:26

## 2020-11-17 RX ADMIN — NORETHINDRONE ACETATE AND ETHINYL ESTRADIOL 1 TABLET: 1; .02 TABLET ORAL at 08:28

## 2020-11-17 RX ADMIN — METOCLOPRAMIDE HYDROCHLORIDE 5 MG: 5 INJECTION INTRAMUSCULAR; INTRAVENOUS at 04:57

## 2020-11-17 RX ADMIN — PIPERACILLIN AND TAZOBACTAM 3.38 G: 3; .375 INJECTION, POWDER, LYOPHILIZED, FOR SOLUTION INTRAVENOUS at 08:28

## 2020-11-17 RX ADMIN — PIPERACILLIN AND TAZOBACTAM 3.38 G: 3; .375 INJECTION, POWDER, LYOPHILIZED, FOR SOLUTION INTRAVENOUS at 16:00

## 2020-11-17 RX ADMIN — METOCLOPRAMIDE HYDROCHLORIDE 5 MG: 5 INJECTION INTRAMUSCULAR; INTRAVENOUS at 10:06

## 2020-11-17 NOTE — PROGRESS NOTES
SPEECH LANGUAGE PATHOLOGY BEDSIDE SWALLOW EVALUATIONS  Patient: Inga Narvaez  (35 y.o. )  Date: 11/17/2020  Primary Diagnosis: UTI (urinary tract infection), Sepsis ([facility] [address] AMS (altered mental status)  Precautions:  seizure, fall, and aspiration    ASSESSMENT :  Patient presents w/ mild oropharyngeal dysphagia. Per nsg, patient tolerating puree/NTL diet. Patient is alert, intermittently follows basic commands, and does not verbalize w/ clinician. Oral phase c/b reduced labial seal w/ anterior spillage most notable w/ thin liquids, reduced bolus formation and manipulation w/ absent lateralization, absent mastication, and reduced A-P transit. Pharyngeal phase c/b rapid reduced controlled swallow w/ thin trials this improves w/ NTL and puree, and HLE is wfl upon palpation. Atypical audible swallow w/ all thin trials and sequential straw sips of NTL. Increased WOB and RR w/ thin trials. .     Patient will benefit from skilled intervention to address the above impairments. Patients rehabilitation potential is considered to be Guarded     PLAN :  Recommendations and Planned Interventions:  Rec cont puree/NTL w/ strict asp/GERD precautions. Patient to be d/c'd today home, d/c orders in place. Discharge Recommendations: [address] for f/u and OP MBS. If patient is not d/c'd rec MBS as IP. SUBJECTIVE:   Patient w/ poor eye contact, does not verbalize or engage w/ clinicia.      OBJECTIVE:     Past Medical History:   Diagnosis Date    Angelman's syndrome     Seizure ([facility] [address]     Seizures ([facility] [address]        CXR Results  (Last 48 hours)    None          Diet prior to admission: unknown  Current Diet:  DIET TUBE FEEDING  DIET DYSPHAGIA PUREED (NDD1)      Cognitive and Communication Status:  Neurologic State: Alert  Orientation Level: Unable to verbalize  Cognition: Decreased attention/concentration, Decreased command following           Swallowing Evaluation:   Oral Assessment:  Oral Assessment  Labial: (does not particpate in oral motor exam)  Dentition: Intact  Oral Hygiene: wfl  Mandible: No impairment  P.O. Trials:  Patient Position: upright in chair  Vocal quality prior to P.O.: (did not verbalize)  Consistency Presented: Ice chips; Nectar thick liquid;Puree; Thin liquid  How Presented: SLP-fed/presented;Cup/sip;Spoon;Straw;Successive swallows     Bolus Acceptance: No impairment  Bolus Formation/Control: Impaired  Type of Impairment: Anterior;Delayed;Lip closure;Mastication;Piecemeal;Spillage  Propulsion: Delayed (# of seconds); Discoordination  Oral Residue: Less than 10% of bolus  Initiation of Swallow: Delayed (# of seconds)  Laryngeal Elevation: Functional  Aspiration Signs/Symptoms: Increase in RR     Oral Phase Severity: Mild  Pharyngeal Phase Severity : Mild  Voice:   Vocal Quality: (did not verbalize)    Pain:  Pain Scale 1: FLACC  Pain Intensity 1: 0         After treatment:   Patient left in no apparent distress in bed, Call bell within reach, Nursing notified and Bed / chair alarm activated    COMMUNICATION/EDUCATION:   Patient's cognitive status negatively impacts ability to comprehend and carryover edcuation. The patient's plan of care including recommendations, planned interventions, and recommended diet changes were discussed with: Registered nurse and Physician. Patient is unable to participate in goal setting and plan of care.     Thank you for this referral.  Deidre Nassar M.S., M.Ed., CCC-SLP  Time Calculation: 16 mins

## 2020-11-17 NOTE — DISCHARGE SUMMARY
Discharge Summary       PATIENT ID: Boy Rees  MRN: [id number]   YOB: 1990    DATE OF ADMISSION: 11/9/2020  8:26 AM    DATE OF DISCHARGE:   PRIMARY CARE PROVIDER: Carmen Tan MD     ATTENDING PHYSICIAN: Erika Carpio  DISCHARGING PROVIDER: Erika Carpio      CONSULTATIONS: IP CONSULT TO HOSPITALIST  IP CONSULT TO NEUROLOGY  IP CONSULT TO INFECTIOUS DISEASES  IP CONSULT TO NEPHROLOGY    PROCEDURES/SURGERIES: * No surgery found *    ADMITTING DIAGNOSES:    Patient Active Problem List    Diagnosis Date Noted    UTI (urinary tract infection) 11/09/2020    Sepsis ([address] 11/09/2020    AMS (altered mental status) 11/09/2020       DISCHARGE DIAGNOSES / PLAN:    Recurrent seizures  Sepsis  Complicated UTI  History of Seizures  Angelman Syndrome  Lactic acidosis  Metabolic acidosis  Hypernatremia  NG-tube in place  Hypokalemia       ADDITIONAL CARE RECOMMENDATIONS:         DISCHARGE MEDICATIONS:  Current Discharge Medication List      START taking these medications    Details   lacosamide (VIMPAT) 10 mg/mL soln oral solution Take 5 mL by mouth two (2) times a day. Max Daily Amount: 100 mg. Qty: 500 mL, Refills: 2    Associated Diagnoses: Seizure ([facility] [address]         CONTINUE these medications which have NOT CHANGED    Details   !! topiramate (Topamax) 200 mg tablet Take 200 mg by mouth daily. At night. !! topiramate (Topamax) 100 mg tablet Take 400 mg by mouth daily. In the morning. levETIRAcetam (Keppra) 500 mg tablet Take 1,500 mg by mouth two (2) times a day. !! - Potential duplicate medications found. Please discuss with provider. NOTIFY YOUR PHYSICIAN FOR ANY OF THE FOLLOWING:   Fever over 101 degrees for 24 hours. Chest pain, shortness of breath, fever, chills, nausea, vomiting, diarrhea, change in mentation, falling, weakness, bleeding. Severe pain or pain not relieved by medications. Or, any other signs or symptoms that you may have questions about.     DISPOSITION:  x Home With:   OT  PT  HH  RN       Long term SNF/Inpatient Rehab    Independent/assisted living    Hospice    Other:       PATIENT CONDITION AT DISCHARGE: Stable      PHYSICAL EXAMINATION AT DISCHARGE:  General:          Alert, cooperative, no distress, appears stated age. HEENT:           Atraumatic, anicteric sclerae, pink conjunctivae                          No oral ulcers, mucosa moist, throat clear, dentition fair  Neck:               Supple, symmetrical  Lungs:             Clear to auscultation bilaterally. No Wheezing or Rhonchi. No rales. Chest wall:      No tenderness  No Accessory muscle use. Heart:              Regular  rhythm,  No  murmur   No edema  Abdomen:        Soft, non-tender. Not distended. Bowel sounds normal  Extremities:     No cyanosis. No clubbing,                            Skin turgor normal, Capillary refill normal  Skin:                Not pale. Not Jaundiced  No rashes   Psych:             Not anxious or agitated. Neurologic:      Alert, moves all extremities, answers questions appropriately and responds to commands     XR CHEST PORT   Final Result      XR CHEST PORT   Final Result      XR CHEST PORT   Final Result   IMPRESSION: Underexpanded lungs. No focal infiltrate. No overt edema. Recent Results (from the past 24 hour(s))   CBC WITH AUTOMATED DIFF    Collection Time: 11/16/20 11:24 AM   Result Value Ref Range    WBC 9.0 3.6 - 11.0 K/uL    RBC 4.23 3.80 - 5.20 M/uL    HGB 12.9 11.5 - 16.0 g/dL    HCT 40.4 35.0 - 47.0 %    MCV 95.5 80.0 - 99.0 FL    MCH 30.5 26.0 - 34.0 PG    MCHC 31.9 30.0 - 36.5 g/dL    RDW 13.5 11.5 - 14.5 %    PLATELET 646 377 - 270 K/uL    MPV 11.5 8.9 - 12.9 FL    NEUTROPHILS 75 32 - 75 %    LYMPHOCYTES 14 12 - 49 %    MONOCYTES 9 5 - 13 %    EOSINOPHILS 2 0 - 7 %    BASOPHILS 0 0 - 1 %    IMMATURE GRANULOCYTES 0 0.0 - 0.5 %    ABS. NEUTROPHILS 6.7 1.8 - 8.0 K/UL    ABS. LYMPHOCYTES 1.3 0.8 - 3.5 K/UL    ABS.  MONOCYTES 0.8 0.0 - 1.0 K/UL ABS. EOSINOPHILS 0.2 0.0 - 0.4 K/UL    ABS. BASOPHILS 0.0 0.0 - 0.1 K/UL    ABS. IMM. GRANS. 0.0 0.00 - 0.04 K/UL    DF AUTOMATED     C REACTIVE PROTEIN, QT    Collection Time: 11/16/20 11:24 AM   Result Value Ref Range    C-Reactive protein 1.16 (H) 0.00 - 0.60 mg/dL   PROCALCITONIN    Collection Time: 11/16/20 11:24 AM   Result Value Ref Range    Procalcitonin 0.10 (H) 0 ng/mL   GLUCOSE, POC    Collection Time: 11/16/20  2:08 PM   Result Value Ref Range    Glucose (POC) 88 65 - 100 mg/dL    Performed by Marycarmen Smith [facility] POC    Collection Time: 11/16/20 11:01 PM   Result Value Ref Range    Glucose (POC) 100 65 - 100 mg/dL    Performed by STEPHY CLINE    GLUCOSE, POC    Collection Time: 11/17/20  8:32 AM   Result Value Ref Range    Glucose (POC) 91 65 - 100 mg/dL    Performed by Olena 46:       Patient is a 35y.o. year old female with a past medical history of Angelman syndrome and seizures presents to the ER with vomiting and fever. Symptoms started last night at 8 pm last night. The farther noticed that she was lethargic and not acting herself. She vomited 4 times, with black coloration. Father took her temperature last night at 101, then 104. She is not eating or drinking well.      She had a seizure in the ER and was treated with Ativan 2mg and Keppra 1g IV.      Family went to [facility] last week and he noticed diarrhea in her diaper. He also notes that they hired a new nanny 2 weeks ago, and thinks maybe she does not know how to properly clean his daughter.         Patient is nonverbal at baseline, bed bound, and has frequent seizures. History acquired from father     No note of coughing, SOB, or chest pain. Father says she has not complained of abdominal pain or flank pain.     Strep present illness refer to history and physical at the time of admission as the patient was admitted because of recurrent seizures complicated UTI while in the hospital patient have having recurrent seizures patient is having recurrent seizures in the hospital patient was transferred to ICU for close observation seen by the neurology patient was started on Vimpat with [address] since then patient has no seizures patient was not eating drinking well NG tube was placed for feeding patient pulled out NG tube now patient more alert awake patient is nonverbal ate some yesterday urine culture blood cultures are negative    Patient no fever no chills no cough no congestion discharge back home with family  Discussed with the neurology regarding discharge planning    Medication reconciliation done    Signed:   Tory Olmstead MD  11/17/2020  8:53 AM

## 2020-11-17 NOTE — PROGRESS NOTES
Patient is being discharged today to home self care. ADDENDUM  CM spoke to sister Linda Mendosa ([phone]) and informed sister that patient is being discharged today. CM contacted father/POA Meredith Jacinto [phone]; received no answer and left a voice message.

## 2020-11-17 NOTE — PROGRESS NOTES
NEURO PROGRESS NOTE        SUBJECTIVE:   Seizures, Angelman syndrome, aspiration pneumonia, UTI, other medical problems      EXAM:  Eyes open, react to voice, being attended to by nurses. No seizures reported  No new focality      ASSESSMENT/PLAN:  Depending on whether comorbidities, patient can be discharged home from a neurological perspective.     ALLERGIES:    No Known Allergies    MEDS:      Current Facility-Administered Medications:     metoclopramide HCl (REGLAN) injection 5 mg, 5 mg, IntraVENous, Q6H, Rosalio Chi MD, 5 mg at 11/17/20 0457    lacosamide (VIMPAT) oral solution 50 mg, 50 mg, Per G Tube, BID, Baljit Baltazar MD, 50 mg at 11/17/20 0826    levETIRAcetam (KEPPRA) oral solution 1,500 mg, 1,500 mg, Per NG tube, BID, Baljit Baltazar MD, 1,500 mg at 11/17/20 0828    acetaminophen (TYLENOL) tablet 650 mg, 650 mg, Oral, Q4H PRN, 650 mg at 11/14/20 2032 **OR** acetaminophen (TYLENOL) suppository 650 mg, 650 mg, Rectal, Q4H PRN, Unique Carpio MD, 650 mg at 11/11/20 1254    piperacillin-tazobactam (ZOSYN) 3.375 g in 0.9% sodium chloride (MBP/ADV) 100 mL MBP, 3.375 g, IntraVENous, Q8H, Cara Rankin MD, Last Rate: 25 mL/hr at 11/17/20 0828, 3.375 g at 11/17/20 0234    norethindrone-ethinyl estradiol (MICROGESTIN 1/20) 1-20 mg-mcg tablet 1 Tab (Patient Supplied), 1 Tab, Oral, DAILY, Marah Combs MD, 1 Tab at 11/17/20 6444    LORazepam (ATIVAN) injection 1 mg, 1 mg, IntraVENous, Q2H PRN, Unique Carpio MD, 1 mg at 11/12/20 5971    polyethylene glycol (MIRALAX) packet 17 g, 17 g, Oral, DAILY PRN, Teresita Carpio MD    ondansetron (ZOFRAN ODT) tablet 4 mg, 4 mg, Oral, Q8H PRN **OR** ondansetron (ZOFRAN) injection 4 mg, 4 mg, IntraVENous, Q6H PRN, Unique Carpio MD, 4 mg at 11/14/20 1026    enoxaparin (LOVENOX) injection 40 mg, 40 mg, SubCUTAneous, DAILY, Unique Carpio MD, 40 mg at 11/17/20 3605    topiramate (TOPAMAX) tablet 400 mg, 400 mg, Oral, DAILY WITH BREAKFAST, Melany Andrews MD, 400 mg at 11/17/20 3588    topiramate (TOPAMAX) tablet 200 mg, 200 mg, Oral, QHS, Unique Carpio MD, 200 mg at 11/16/20 2245    LABS:  Recent Results (from the past 24 hour(s))   CBC WITH AUTOMATED DIFF    Collection Time: 11/16/20 11:24 AM   Result Value Ref Range    WBC 9.0 3.6 - 11.0 K/uL    RBC 4.23 3.80 - 5.20 M/uL    HGB 12.9 11.5 - 16.0 g/dL    HCT 40.4 35.0 - 47.0 %    MCV 95.5 80.0 - 99.0 FL    MCH 30.5 26.0 - 34.0 PG    MCHC 31.9 30.0 - 36.5 g/dL    RDW 13.5 11.5 - 14.5 %    PLATELET 796 240 - 744 K/uL    MPV 11.5 8.9 - 12.9 FL    NEUTROPHILS 75 32 - 75 %    LYMPHOCYTES 14 12 - 49 %    MONOCYTES 9 5 - 13 %    EOSINOPHILS 2 0 - 7 %    BASOPHILS 0 0 - 1 %    IMMATURE GRANULOCYTES 0 0.0 - 0.5 %    ABS. NEUTROPHILS 6.7 1.8 - 8.0 K/UL    ABS. LYMPHOCYTES 1.3 0.8 - 3.5 K/UL    ABS. MONOCYTES 0.8 0.0 - 1.0 K/UL    ABS. EOSINOPHILS 0.2 0.0 - 0.4 K/UL    ABS. BASOPHILS 0.0 0.0 - 0.1 K/UL    ABS. IMM.  GRANS. 0.0 0.00 - 0.04 K/UL    DF AUTOMATED     C REACTIVE PROTEIN, QT    Collection Time: 11/16/20 11:24 AM   Result Value Ref Range    C-Reactive protein 1.16 (H) 0.00 - 0.60 mg/dL   PROCALCITONIN    Collection Time: 11/16/20 11:24 AM   Result Value Ref Range    Procalcitonin 0.10 (H) 0 ng/mL   GLUCOSE, POC    Collection Time: 11/16/20  2:08 PM   Result Value Ref Range    Glucose (POC) 88 65 - 100 mg/dL    Performed [facility]en [facility] P[facility]    Collection Time: 11/16/20 11:01 PM   Result Value Ref Range    Glucose (POC) 100 65 - 100 mg/dL    Performed by STEPHY CLINE    GLUCOSE, POC    Collection Time: 11/17/20  8:32 AM   Result Value Ref Range    Glucose (POC) 91 65 - 100 mg/dL    Performed by Roland Westfall Vitals  /68   Pulse 80   Temp 97.8 °F (36.6 °C)   Resp 17   Ht 5' (1.524 m)   Wt 51.7 kg (114 lb)   SpO2 97%   Breastfeeding No   BMI 22.26 kg/m²       Imaging:  XR CHEST PORT   Final Result      XR CHEST PORT   Final Result      XR CHEST PORT   Final Result IMPRESSION: Underexpanded lungs. No focal infiltrate. No overt edema.

## 2020-11-17 NOTE — PROGRESS NOTES
PHYSICAL THERAPY EVALUATION Patient: Kimberly Villarreal (21 y.o. female) Date: 11/17/2020 Primary Diagnosis: UTI (urinary tract infection) [N39.0] Sepsis ([facility] [address] [A41.9] AMS (altered mental status) [R41.82] Precautions: total assist  
 
ASSESSMENT Based on the objective data described below, the patient presents with ***. Current Level of Function Impacting Discharge (mobility/balance): *** Functional Outcome Measure: The patient scored *** on the *** outcome measure which is indicative of ***. Other factors to consider for discharge: *** Patient will benefit from skilled therapy intervention to address the above noted impairments. PLAN : 
Recommendations and Planned Interventions: {recommendations and planned interventions PT:10599:::0} Frequency/Duration: Patient will be followed by physical therapy:  {FREQUENCY:99179} to address goals. Recommendation for discharge: (in order for the patient to meet his/her long term goals) {therapy discharge recs PT:54781:::0} This discharge recommendation: 
{therapy discharge collaboration:24343:::0} 
 
IF patient discharges home will need the following DME: {DME Devices:94082} SUBJECTIVE:  
Patient stated ***. OBJECTIVE DATA SUMMARY:  
HISTORY:   
Past Medical History:  
Diagnosis Date  Angelman's syndrome  Seizure ([facility] [address]  Seizures ([facility] [address] No past surgical history on file. Personal factors and/or comorbidities impacting plan of care: *** Home Situation Home Environment: Private residence # Steps to Enter: 4 One/Two Story Residence: Two story, live on 1st floor # of Interior Steps: 0 Interior Rails: None Living Alone: No 
Support Systems: Family member(s) Patient Expects to be Discharged to[de-identified] Private residence Current DME Used/Available at Home: Nico Acosta EXAMINATION/PRESENTATION/DECISION MAKING:  
Critical Behavior: 
Neurologic State: Alert Orientation Level: Unable to verbalize Cognition: Decreased attention/concentration, Decreased command following Hearing: Auditory Auditory Impairment: None Skin:  *** Edema: *** Range Of Motion: 
AROM: Grossly decreased, non-functional 
  
  
  
  
  
  
  
Strength:   
Strength: Grossly decreased, non-functional 
  
  
  
  
  
  
Tone & Sensation:  
  
  
  
  
  
  
  
  
  
   
Coordination: 
  
Vision:  
  
Functional Mobility: 
Bed Mobility: 
Rolling: Maximum assistance Supine to Sit: Maximum assistance Sit to Supine: Maximum assistance Scooting: Maximum assistance Transfers: 
  
  
     
  
     
  
  
Balance:  
Sitting: Impaired;High guard; With support Sitting - Static: Poor (constant support) Ambulation/Gait Training: 
  
  
  
  
  
  
  
  
  
  
  
  
  
  
  
  
  
 *** Stairs: Therapeutic Exercises:  
*** Functional Measure: 
*** Physical Therapy Evaluation Charge Determination History Examination Presentation Decision-Making  
{PT OP History:94904:::0} {PT OP Examination:76724:::0} {PT OP Presentation:05992:::0} {PT OP Decision Makin:::0} Based on the above components, the patient evaluation is determined to be of the following complexity level: {PT OP Complexity:29642:::0} 
 
Pain Rating: 
*** Activity Tolerance:  
{therapy activity tolerance:79571:::0} Please refer to the flowsheet for vital signs taken during this treatment. After treatment patient left in no apparent distress:  
{AFTER therapy treatment:57542:::0} 
 
COMMUNICATION/EDUCATION:  
The patients plan of care was discussed with: {POC discussed with:93464:::0}.  
 
{education goal setting and plan of care PT:43029:::0} Thank you for this referral. 
Daniel Jennings Time Calculation: 20 mins

## 2020-11-17 NOTE — PROGRESS NOTES
Progress Note    Patient: Elli Rosenbaum MRN: [id number]  SSN: [id number]    YOB: 1990  Age: 35 y.o. Sex: female      Admit Date: 11/9/2020    LOS: 8 days     Subjective:   Patient followed for sepsis with suspected UTI and aspiration pneumonia. Overnight she remained febrile but WBC is now normal.  Procalcitonin and CRP are elevated. Repeat CXR still showing clear lungs. She is on  Zosyn alone. She is awake and appears to be resting comfortably. Plans apparently for discharge today. Objective:     Vitals:    11/16/20 2102 11/17/20 0120 11/17/20 0453 11/17/20 0715   BP: 114/83 117/73 107/60 106/68   Pulse: 84 93 84 80   Resp: 18 18 20 17   Temp: 98.3 °F (36.8 °C) 98.3 °F (36.8 °C) 98.3 °F (36.8 °C) 97.8 °F (36.6 °C)   SpO2: 96% 98% 96% 97%   Weight:       Height:            Intake and Output:  Current Shift: No intake/output data recorded. Last three shifts: 11/15 1901 - 11/17 0700  In: 60 [P.O.:60]  Out: 1250 [Urine:1250]    Physical Exam:    Constitutional:       General: She is not in acute distress. Appearance: She is ill-appearing. She is not diaphoretic. Genitourinary:     Comments: Irvin catheter  Musculoskeletal:      Right lower leg: No edema. Left lower leg: No edema. Skin:     Findings: No erythema or rash. Neurological:      Comments: Unable to assess  No seizure activity   Psychiatric:      Comments: Unable to assess     Lab/Data Review:    WBC 9,000  Procalcitonin  0.10 <1.04  CRP 1.16 <1.43 <3.73    Blood cultures (11/9) No growth FINAL  Urine culture (11/11) No growth FINAL    Assessment:     Active Problems:    UTI (urinary tract infection) (11/9/2020)      Sepsis ([address] (11/9/2020)      AMS (altered mental status) (11/9/2020)    1. Sepsis with fever, elevated lactic acid, leukocytosis, elevated procalcitonin and CRP, Day #7 IV Zosyn. 2. Presumptive UTI with  pyuria, urine culture negative, status post Zosyn  3.  At risk for aspiration pneumonia, unconfirmed  4. Seizure disorder, active    Comment:  No clear source of infection. Inflammatory markers resolving. Plan:   1. Reasonable to discontinue Zosyn  2.  Cleared for discharge from ID standpoint    Signed By: Kimberly Dave MD     November 17, 2020

## 2020-11-18 NOTE — ROUTINE PROCESS
I have reviewed discharge instructions with the parent The parent verbalized understanding. Patient was discharged home to self care with written and verbal instructions  Patient's father indicated understanding   Removed all telemetry,IV. And ch equipment  Wheeled patient and personal belongings to the front entrance of the hospital where a private vehicle was waiting at 1900

## 2020-11-24 ENCOUNTER — HOSPITAL ENCOUNTER (OUTPATIENT)
Dept: PHYSICAL THERAPY | Age: 30
Discharge: HOME | End: 2020-11-24
Payer: MEDICAID

## 2020-11-24 PROCEDURE — 97162 PT EVAL MOD COMPLEX 30 MIN: CPT

## 2020-11-24 NOTE — PROGRESS NOTES
.[facility] [employer] [facility] [employer]  [address] [facility]  [phone] fax    Plan of Care/ Statement of Necessity for Physical Therapy Services    Patient name: Yanira Paul Start of Care: 2020   Referral source: Keiko Aguilar MD : 1990    Medical Diagnosis: Other abnormalities of gait and mobility [R26.89]  Payor: BLUE [employer] MEDICAID / Plan: [address] [place] [address] / Product Type: Managed Care Medicaid /  Onset Date:20     Treatment Diagnosis: BLE Weakness, Balance, Gait    Prior Hospitalization: see medical history Provider#: [id number]   Medications: Verified on Patient summary List    Comorbidities: Angelman's Syndrome, Epilepsy, Scoliosis, 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 Assessment/ key information: Pt is a 36 yo F who presents with inability to walk since hospitalization for ~2 weeks d/t UTI and seizures. She also has demonstrated decreased head control when fatigued since hospitalization. Pt is nonverbal and subjective history was provided by patient's father's fiance. Pt required 2 person modA for sit<>stand transfer. She required 2 person modA to maintain balance with attempted gait. Pt ambulates with inversion and plantarflexion of B ankles and flexed knees. She took 3 steps before dropping her weight, likely d/t fatigue. Further evaluation was limited d/t pt's inability to follow commands. Pt's father's fiance reports that pt was walking and running independently with altered gait pattern, and she was completing transfers independently prior to hospitalization. Currently, family member is lifting pt for dependent transfer with all transfers.   Pt will benefit from skilled PT to address strength, balance, and functional mobility deficits in order to maximize independence, reduce caregiver burden, and improve QOL. Evaluation Complexity History HIGH Complexity :3+ comorbidities / personal factors will impact the outcome/ POC ; Examination HIGH Complexity : 4+ Standardized tests and measures addressing body structure, function, activity limitation and / or participation in recreation  ;Presentation HIGH Complexity : Unstable and unpredictable characteristics  ; Clinical Decision Making HIGH Complexity : FOTO score of 1- 25 - based on clinical judgment   Overall Complexity Rating: HIGH   Problem List: decrease ROM, decrease strength, impaired gait/ balance, decrease ADL/ functional abilitiies, decrease activity tolerance, decrease flexibility/ joint mobility and decrease transfer abilities   Treatment Plan may include any combination of the following: Therapeutic exercise, Therapeutic activities, Neuromuscular re-education, Physical agent/modality, Gait/balance training, Manual therapy, Patient education, Self Care training, Functional mobility training, Home safety training and Stair training  Patient / Family readiness to learn indicated by: trying to perform skills  Persons(s) to be included in education: patient (P) and family support person (FSP);list father, father's fiance  Barriers to Learning/Limitations: yes;  cognitive, reading/writing and sensory deficits-vision/hearing/speech  Patient Goal (s): to walk again  Patient Self Reported Health Status: poor  Rehabilitation Potential: fair    Short Term Goals: To be accomplished in 1 weeks:  1. Pt will be compliant with initial HEP in order to optimize therapy outcomes. Long Term Goals: To be accomplished in 4 weeks:  1. Pt will perform sit<>stand with Lita in order to demonstrate improved ease of transfers.    2.  Pt will ambulate 20' with Lita in order to progress towards improved ease of household negotiation     Frequency / Duration: Patient to be seen 2-3 times per week for 4 weeks. Patient/ CarPatient/ Caregiver education and instruction: Diagnosis, prognosis, exercises   [x]  Plan of care has been reviewed with ROCIO Akbar, PT 11/24/2020 1:39 PM    ________________________________________________________________________    I certify that the above Therapy Services are being furnished while the patient is under my care. I agree with the treatment plan and certify that this therapy is necessary.     Physician's Signature:____________Date:_________TIME:________    ** Signature, Date and Time must be completed for valid certification **    Please sign and return to [facility] [employer] [facility] [employer]  [address] [place] [address]  [phone] fax

## 2020-11-25 NOTE — PROGRESS NOTES
PT DAILY TREATMENT NOTE/NEURO EVAL     Patient Name: Linda Granados  Date:2020  : 1990  [x]  Patient  Verified  Payor: BLUE CROSS MEDICAID / Plan: [facility] [employer] / Product Type: Managed Care Medicaid /    In time:1:35  Out time:2:10  Total Treatment Time (min): 35  Visit #: 1 of 12      Treatment Area: Other abnormalities of gait and mobility [R26.89]    SUBJECTIVE  Pain Level (0-10 scale): Unable to obtain   []constant []intermittent []improving []worsening []no change since onset    Any medication changes, allergies to medications, adverse drug reactions, diagnosis change, or new procedure performed?: [x] No    [] Yes (see summary sheet for update)  Subjective functional status/changes:       Pt is a 34 yo F who presents with inability to walk since hospitalization for ~2 weeks d/t UTI and seizures. She also has demonstrated decreased head control when fatigued since hospitalization. Pt is nonverbal and subjective history was provided by patient's father's fiance. Pt's father's fiance reports that pt was walking and running independently with altered gait pattern, and she was completing transfers independently prior to hospitalization. Currently, family member is lifting pt for dependent transfer with all transfers. Pt will benefit from skilled PT to address strength, balance, and functional mobility deficits in order to maximize independence, reduce caregiver burden, and improve QOL.        Cognition: A & O x     Other: Unable to complete verbal communication     OBJECTIVE/EXAMINATION    35 min [x]Eval                  []Re-Eval          With   [] TE   [] TA   [] neuro   [] other: Patient Education: [x] Review HEP    [] Progressed/Changed HEP based on:   [] positioning   [] body mechanics   [] transfers   [] heat/ice application    [x] other: reviewed stretches with HEP with pt's father's fiance      Other Objective/Functional Measures:     /82 taken manually prior to therapy     Physical Therapy Evaluation - Neurologic    Posture: [x] Poor    [] Fair    [] Good    Describe:  Flexed posture in wheelchair, head tilted to left       Gait: [x] Normal    [] Abnormal    Device:      Describe: inversion and plantarflexion of B ankles, maintains flexed knees, able to take 3 steps with 2 person assist for balance before stopping    ROM:  Unable to achieve neutral DF or knee extension  Questionable contracture vs. tone     Strength (MMT):  Unable to perform formal MMT d/t pt's inability to follow commands    Tone:  Significant tone evident BLE  Unable to formally assess d/t pt's inability to follow commands to relax     Sensation:  Unable to assess d/t lack of verbal communication       Balance/ Equilibrium:   Unable to stand without assistance       Functional Mobility      Bed Mobility: Not assessed this visit d/t pt fatigue      Scooting:        Rolling:       Sit-Supine:      Transfers:       Sit-Stand: 2 person modA      Gait: 2 person modA to maintain balance       Behavior: [x] Cooperative    [] Impulsive    [] Agitated    [] Perseverative    [] Confused   Oriented x: Unable to assess    Cognition: Unable to follow commands    Other:       Able to Express Needs [] Y    [x] N    Other test /comments:  No swelling, pitting edema, redness, or increased temp B calves      Pain Level (0-10 scale) post treatment: Unable to assess     ASSESSMENT/Changes in Function: See POC    Patient will continue to benefit from skilled PT services to modify and progress therapeutic interventions, address functional mobility deficits, address ROM deficits, address strength deficits, analyze and address soft tissue restrictions, analyze and cue movement patterns, analyze and modify body mechanics/ergonomics, assess and modify postural abnormalities, address imbalance/dizziness and instruct in home and community integration to attain remaining goals.      [x]  See Plan of Care  []  See progress note/recertification  []  See Discharge Summary         Progress towards goals / Updated goals:  See POC    PLAN  [x]  Upgrade activities as tolerated     []  Continue plan of care  [x]  Update interventions per flow sheet       []  Discharge due to:_  []  Other:_      Corina Sexton, PT 11/25/2020  4:34 PM

## 2020-12-18 ENCOUNTER — HOSPITAL ENCOUNTER (OUTPATIENT)
Dept: PHYSICAL THERAPY | Age: 30
Discharge: HOME | End: 2020-12-18

## 2020-12-21 ENCOUNTER — APPOINTMENT (OUTPATIENT)
Dept: PHYSICAL THERAPY | Age: 30
End: 2020-12-21

## 2020-12-30 ENCOUNTER — TELEPHONE (OUTPATIENT)
Dept: PHYSICAL THERAPY | Age: 30
End: 2020-12-30

## 2020-12-30 NOTE — PROGRESS NOTES
[facility] [employer]  [address] 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 Pt will be compliant with initial HEP in order to optimize therapy outcomes. Long Term Goals: To be accomplished in 4 weeks:  1. Pt will perform sit<>stand with Lita in order to demonstrate improved ease of transfers. 2.  Pt will ambulate 20' with Lita in order to progress towards improved ease of household negotiation     No goals met as pt did not return to therapy following initial evaluation     ASSESSMENT/RECOMMENDATIONS:  Pt's family called and self-DC. They are unable to consistently drive the 1.5 hour trip one-way to have pt attend therapy at this location. Called and spoke to pt's father and advised him to discuss therapy options with MD.  Pt may benefit from home health therapy.       [x]Discontinue therapy: []Patient has reached or is progressing toward set goals      [x]Patient is non-compliant or has abdicated      []Due to lack of appreciable progress towards set goals    Sharmila Monsalve, PT 2020 5:09 PM    NOTE TO PHYSICIAN:  Please complete the following and fax to: [facility] at [facility] at [phone]  Retain this original for your records. If you are unable to process this request in   24 hours, please contact our office.      [] I have read the above report and request that my patient continue therapy with the following changes/special instructions:  [] I have read the above report and request that my patient be discharged from therapy    Physician's Signature:_____________________ Date:___________Time:__________

## 2021-04-28 ENCOUNTER — APPOINTMENT (OUTPATIENT)
Dept: GENERAL RADIOLOGY | Age: 31
End: 2021-04-28
Attending: EMERGENCY MEDICINE
Payer: MEDICAID

## 2021-04-28 ENCOUNTER — HOSPITAL ENCOUNTER (EMERGENCY)
Age: 31
Discharge: HOME | End: 2021-04-28
Attending: EMERGENCY MEDICINE
Payer: MEDICAID

## 2021-04-28 VITALS
HEIGHT: 58 IN | SYSTOLIC BLOOD PRESSURE: 119 MMHG | TEMPERATURE: 99.4 F | DIASTOLIC BLOOD PRESSURE: 87 MMHG | HEART RATE: 98 BPM | OXYGEN SATURATION: 98 % | BODY MASS INDEX: 23.3 KG/M2 | RESPIRATION RATE: 18 BRPM | WEIGHT: 111 LBS

## 2021-04-28 DIAGNOSIS — K59.00 CONSTIPATION, UNSPECIFIED CONSTIPATION TYPE: ICD-10-CM

## 2021-04-28 DIAGNOSIS — R50.9 FEVER, UNSPECIFIED FEVER CAUSE: Primary | ICD-10-CM

## 2021-04-28 LAB
ALBUMIN SERPL-MCNC: 3.5 G/DL (ref 3.5–5)
ALBUMIN/GLOB SERPL: 0.7 {RATIO} (ref 1.1–2.2)
ALP SERPL-CCNC: 71 U/L (ref 45–117)
ALT SERPL-CCNC: 83 U/L (ref 12–78)
ANION GAP SERPL CALC-SCNC: 7 MMOL/L (ref 5–15)
ANION GAP SERPL CALC-SCNC: 8 MMOL/L (ref 5–15)
APPEARANCE UR: CLEAR
AST SERPL W P-5'-P-CCNC: 51 U/L (ref 15–37)
BACTERIA URNS QL MICRO: NEGATIVE /HPF
BASOPHILS # BLD: 0 K/UL (ref 0–0.1)
BASOPHILS NFR BLD: 0 % (ref 0–1)
BILIRUB SERPL-MCNC: 0.3 MG/DL (ref 0.2–1)
BILIRUB UR QL: NEGATIVE
BUN SERPL-MCNC: 13 MG/DL (ref 6–20)
BUN SERPL-MCNC: 13 MG/DL (ref 6–20)
BUN/CREAT SERPL: 13 (ref 12–20)
BUN/CREAT SERPL: 13 (ref 12–20)
CA-I BLD-MCNC: 9.3 MG/DL (ref 8.5–10.1)
CA-I BLD-MCNC: 9.5 MG/DL (ref 8.5–10.1)
CHLORIDE SERPL-SCNC: 111 MMOL/L (ref 97–108)
CHLORIDE SERPL-SCNC: 111 MMOL/L (ref 97–108)
CO2 SERPL-SCNC: 21 MMOL/L (ref 21–32)
CO2 SERPL-SCNC: 22 MMOL/L (ref 21–32)
COLOR UR: ABNORMAL
CREAT SERPL-MCNC: 1 MG/DL (ref 0.55–1.02)
CREAT SERPL-MCNC: 1 MG/DL (ref 0.55–1.02)
DIFFERENTIAL METHOD BLD: ABNORMAL
EOSINOPHIL # BLD: 0 K/UL (ref 0–0.4)
EOSINOPHIL NFR BLD: 0 % (ref 0–7)
ERYTHROCYTE [DISTWIDTH] IN BLOOD BY AUTOMATED COUNT: 13.6 % (ref 11.5–14.5)
GLOBULIN SER CALC-MCNC: 4.9 G/DL (ref 2–4)
GLUCOSE SERPL-MCNC: 120 MG/DL (ref 65–100)
GLUCOSE SERPL-MCNC: 124 MG/DL (ref 65–100)
GLUCOSE UR STRIP.AUTO-MCNC: NEGATIVE MG/DL
HCG SERPL QL: NEGATIVE
HCT VFR BLD AUTO: 40.1 % (ref 35–47)
HGB BLD-MCNC: 13.1 G/DL (ref 11.5–16)
HGB UR QL STRIP: ABNORMAL
IMM GRANULOCYTES # BLD AUTO: 0 K/UL (ref 0–0.04)
IMM GRANULOCYTES NFR BLD AUTO: 0 % (ref 0–0.5)
KETONES UR QL STRIP.AUTO: NEGATIVE MG/DL
LACTATE SERPL-SCNC: 1.4 MMOL/L (ref 0.4–2)
LACTATE SERPL-SCNC: 2.8 MMOL/L (ref 0.4–2)
LEUKOCYTE ESTERASE UR QL STRIP.AUTO: NEGATIVE
LIPASE SERPL-CCNC: 122 U/L (ref 73–393)
LYMPHOCYTES # BLD: 1.1 K/UL (ref 0.8–3.5)
LYMPHOCYTES NFR BLD: 9 % (ref 12–49)
MCH RBC QN AUTO: 30.4 PG (ref 26–34)
MCHC RBC AUTO-ENTMCNC: 32.7 G/DL (ref 30–36.5)
MCV RBC AUTO: 93 FL (ref 80–99)
MONOCYTES # BLD: 0.9 K/UL (ref 0–1)
MONOCYTES NFR BLD: 8 % (ref 5–13)
NEUTS SEG # BLD: 9.7 K/UL (ref 1.8–8)
NEUTS SEG NFR BLD: 83 % (ref 32–75)
NITRITE UR QL STRIP.AUTO: NEGATIVE
PH UR STRIP: 6 [PH] (ref 5–8)
PLATELET # BLD AUTO: 308 K/UL (ref 150–400)
PMV BLD AUTO: 10.4 FL (ref 8.9–12.9)
POTASSIUM SERPL-SCNC: 3.5 MMOL/L (ref 3.5–5.1)
POTASSIUM SERPL-SCNC: 3.5 MMOL/L (ref 3.5–5.1)
PROT SERPL-MCNC: 8.4 G/DL (ref 6.4–8.2)
PROT UR STRIP-MCNC: NEGATIVE MG/DL
RBC # BLD AUTO: 4.31 M/UL (ref 3.8–5.2)
RBC #/AREA URNS HPF: ABNORMAL /HPF (ref 0–5)
SODIUM SERPL-SCNC: 140 MMOL/L (ref 136–145)
SODIUM SERPL-SCNC: 140 MMOL/L (ref 136–145)
SP GR UR REFRACTOMETRY: 1.02 (ref 1–1.03)
UA: UC IF INDICATED,UAUC: ABNORMAL
UROBILINOGEN UR QL STRIP.AUTO: 0.1 EU/DL (ref 0.1–1)
WBC # BLD AUTO: 11.7 K/UL (ref 3.6–11)
WBC URNS QL MICRO: ABNORMAL /HPF (ref 0–4)

## 2021-04-28 PROCEDURE — 87040 BLOOD CULTURE FOR BACTERIA: CPT

## 2021-04-28 PROCEDURE — 99283 EMERGENCY DEPT VISIT LOW MDM: CPT

## 2021-04-28 PROCEDURE — 81001 URINALYSIS AUTO W/SCOPE: CPT

## 2021-04-28 PROCEDURE — 36415 COLL VENOUS BLD VENIPUNCTURE: CPT

## 2021-04-28 PROCEDURE — 83605 ASSAY OF LACTIC ACID: CPT

## 2021-04-28 PROCEDURE — U0005 INFEC AGEN DETEC AMPLI PROBE: HCPCS

## 2021-04-28 PROCEDURE — 83690 ASSAY OF LIPASE: CPT

## 2021-04-28 PROCEDURE — 80048 BASIC METABOLIC PNL TOTAL CA: CPT

## 2021-04-28 PROCEDURE — 85025 COMPLETE CBC W/AUTO DIFF WBC: CPT

## 2021-04-28 PROCEDURE — 80053 COMPREHEN METABOLIC PANEL: CPT

## 2021-04-28 PROCEDURE — 74011250637 HC RX REV CODE- 250/637: Performed by: EMERGENCY MEDICINE

## 2021-04-28 PROCEDURE — 74011250636 HC RX REV CODE- 250/636: Performed by: EMERGENCY MEDICINE

## 2021-04-28 PROCEDURE — 84703 CHORIONIC GONADOTROPIN ASSAY: CPT

## 2021-04-28 RX ORDER — POLYETHYLENE GLYCOL 3350 17 G/17G
17 POWDER, FOR SOLUTION ORAL DAILY
Qty: 235 G | Refills: 0 | Status: SHIPPED | OUTPATIENT
Start: 2021-04-28

## 2021-04-28 RX ORDER — ACETAMINOPHEN 325 MG/1
TABLET ORAL
Status: DISCONTINUED
Start: 2021-04-28 | End: 2021-04-29 | Stop reason: HOSPADM

## 2021-04-28 RX ORDER — ACETAMINOPHEN 325 MG/1
650 TABLET ORAL ONCE
Status: COMPLETED | OUTPATIENT
Start: 2021-04-28 | End: 2021-04-28

## 2021-04-28 RX ADMIN — ACETAMINOPHEN 650 MG: 325 TABLET, FILM COATED ORAL at 16:08

## 2021-04-28 RX ADMIN — SODIUM CHLORIDE 1000 ML: 9 INJECTION, SOLUTION INTRAVENOUS at 19:21

## 2021-04-29 LAB — SARS-COV-2, COV2: NORMAL

## 2021-04-29 NOTE — ED PROVIDER NOTES
HPI   Chief Complaint   Patient presents with    Abdominal Pain     31yoF hx Angelman's and seizure resents with abdominal pain. Patient's father reports that for the past 2 days patient has had a very large caliber hard stool, it was so severe that the patient's father had to break down her stool in the toilet in order to flush it down. He reports patient recently started Vimpat, he is wondering if this has caused her constipation. He has noticed that the patient has been intermittently holding her stomach, hunched over, seeming to be in pain. This morning patient ate her breakfast sandwich but ate much slower than her usual speed of eating. Patient's father noted fever today, reports she has a history of urinary tract infection. He denies any cough, vomiting, sick contacts. Due to patient's baseline MR she is unable to give me a clear history or review of systems. Past Medical History:   Diagnosis Date    Angelman's syndrome     Seiz[facility] ([facility] [address]     Seizures ([facility] [address]        No past surgical history on file. No family history on file.     Social History     Socioeconomic History    Marital status: SINGLE     Spouse name: Not on file    Number of children: Not on file    Years of education: Not on file    Highest education level: Not on file   Occupational History    Not on file   Social Needs    Financial resource strain: Not on file    Food insecurity     Worry: Not on file     Inability: Not on file    Transportation needs     Medical: Not on file     Non-medical: Not on file   Tobacco Use    Smoking status: Never Smoker    Smokeless tobacco: Never Used   Substance and Sexual Activity    Alcohol use: Never     Frequency: Never    Drug use: Never    Sexual activity: Not on file   Lifestyle    Physical activity     Days per week: Not on file     Minutes per session: Not on file    Stress: Not on file   Relationships    Social connections     Talks on phone: Not on file     Gets together: Not on file     Attends Gnosticist service: Not on file     Active member of club or organization: Not on file     Attends meetings of clubs or organizations: Not on file     Relationship status: Not on file    Intimate partner violence     Fear of current or ex partner: Not on file     Emotionally abused: Not on file     Physically abused: Not on file     Forced sexual activity: Not on file   Other Topics Concern    Not on file   Social History Narrative    Not on file         ALLERGIES: Patient has no known allergies. Review of Systems   Unable to perform ROS: Other   MR associated with Angelman's. Vitals:    04/28/21 1527 04/28/21 1559 04/28/21 1716   BP: 117/61  119/87   Pulse: 99  98   Resp: 18  18   Temp: 99.2 °F (37.3 °C) (!) 100.9 °F (38.3 °C) 99.4 °F (37.4 °C)   SpO2: 98%  98%   Weight: 50.3 kg (111 lb)     Height: 4' 10\" (1.473 m)              Physical Exam   Patient Vitals for the past 8 hrs:   Temp Pulse Resp BP SpO2   04/28/21 1716 99.4 °F (37.4 °C) 98 18 119/87 98 %   04/28/21 1559 (!) 100.9 °F (38.3 °C)            Nursing note and vitals reviewed. Constitutional: NAD. Head: Normocephalic and atraumatic. Mouth/Throat: Airway patent. Moist mucous membranes. Eyes: EOMI. No scleral icterus. Neck: Neck supple. Cardiovascular: Normal rate, regular rhythm. Normal heart sounds. No murmur, rub, or gallop. Good pulses throughout. Pulmonary/Chest: No respiratory distress. Clear to auscultation bilaterally. No wheezes, rales, or rhonchi. Abdominal/GI: BS normal, Soft, non-tender, non-distended. No rebound or guarding. Musculoskeletal: No gross injuries or deformities. Neurological: Alert. Moving all extremities. Non verbal.   Skin: Skin is warm and dry. No rash noted. MDM   Ddx = UTI, COVID-19, pneumonia, constipation, bacteremia, low suspicion for bowel obstruction, perforated viscus, appendicitis (nontender), and meningitis. Patient does have nonspecific leukocytosis.   I tried to assess the patient with chest x-ray and abdominal x-ray to evaluate for pneumonia and stool burden, however patient's father reports she has a medical implant and he was told to never have her undergo x-rays or else the implant can malfunction, I attempted to reassure him that this is most likely related to MRI and magnets however he still adamantly declines. Patient is satting well, normal breath sounds on exam, making pneumonia a little bit less likely. Sent off blood cultures. Straight cath the patient for urine, not having any UTI. The blood in urine is most likely related to straight cath. Patient's lactic acid is 2.8, initially elevated. Given 2 L IV fluid bolus, on recheck lactic acid is normal.  Sent off COVID-19 test.  I offered admission to the patient as she is nonverbal and initially has lactic acidosis, however the father declines saying that the last time she was in the hospital she had many seizures because nursing was not able to offer her continuous supervision. The father reports that she will be closely monitored at home by video cameras and multiple family members, he agrees to return to ED if her condition worsens or if her fever continues. He seems to have capacity to make medical decision for her. He verbalizes understanding that her condition can deteriorate rapidly leading to sepsis and possible demise. Labs Reviewed   CBC WITH AUTOMATED DIFF - Abnormal; Notable for the following components:       Result Value    WBC 11.7 (*)     NEUTROPHILS 83 (*)     LYMPHOCYTES 9 (*)     ABS.  NEUTROPHILS 9.7 (*)     All other components within normal limits   METABOLIC PANEL, BASIC - Abnormal; Notable for the following components:    Chloride 111 (*)     Glucose 124 (*)     All other components within normal limits   METABOLIC PANEL, COMPREHENSIVE - Abnormal; Notable for the following components:    Chloride 111 (*)     Glucose 120 (*)     AST (SGOT) 51 (*)     ALT (SGPT) 83 (*)     Protein, total 8.4 (*)     Globulin 4.9 (*)     A-G Ratio 0.7 (*)     All other components within normal limits   LACTIC ACID - Abnormal; Notable for the following components:    Lactic acid 2.8 (*)     All other components within normal limits   URINALYSIS W/ REFLEX CULTURE - Abnormal; Notable for the following components:    Blood Small (*)     All other components within normal limits   CULTURE, BLOOD, PAIRED   LIPASE   HCG QL SERUM   LACTIC ACID   SARS-COV-2     No orders to display     Medications   acetaminophen (TYLENOL) 325 mg tablet (has no administration in time range)   sodium chloride 0.9 % bolus infusion 1,000 mL (has no administration in time range)   acetaminophen (TYLENOL) tablet 650 mg (650 mg Oral Given 4/28/21 1608)   sodium chloride 0.9 % bolus infusion 1,000 mL (1,000 mL IntraVENous New Bag 4/28/21 1921)     Kristen LIEBERMAN MD, am  the first and primary ED provider for this patient.           Procedures

## 2021-04-30 LAB
BACTERIA SPEC CULT: NORMAL
BACTERIA SPEC CULT: NORMAL
SARS-COV-2, COV2NT: NOT DETECTED
SPECIAL REQUESTS,SREQ: NORMAL

## 2022-03-18 PROBLEM — N39.0 UTI (URINARY TRACT INFECTION): Status: ACTIVE | Noted: 2020-11-09

## 2022-03-19 PROBLEM — R41.82 AMS (ALTERED MENTAL STATUS): Status: ACTIVE | Noted: 2020-11-09

## 2022-03-19 PROBLEM — A41.9 SEPSIS (HCC): Status: ACTIVE | Noted: 2020-11-09

## 2025-03-14 ENCOUNTER — APPOINTMENT (OUTPATIENT)
Facility: HOSPITAL | Age: 35
DRG: 053 | End: 2025-03-14
Payer: MEDICAID

## 2025-03-14 ENCOUNTER — HOSPITAL ENCOUNTER (INPATIENT)
Facility: HOSPITAL | Age: 35
LOS: 2 days | Discharge: HOME | DRG: 053 | End: 2025-03-16
Attending: EMERGENCY MEDICINE | Admitting: STUDENT IN AN ORGANIZED HEALTH CARE EDUCATION/TRAINING PROGRAM
Payer: MEDICAID

## 2025-03-14 DIAGNOSIS — G40.919 BREAKTHROUGH SEIZURE (HCC): Primary | ICD-10-CM

## 2025-03-14 DIAGNOSIS — U07.1 COVID-19: ICD-10-CM

## 2025-03-14 DIAGNOSIS — E16.2 HYPOGLYCEMIA: ICD-10-CM

## 2025-03-14 PROBLEM — R56.9 SEIZURE (HCC): Status: ACTIVE | Noted: 2025-03-14

## 2025-03-14 LAB
ALBUMIN SERPL-MCNC: 4.2 G/DL (ref 3.5–5)
ALBUMIN/GLOB SERPL: 0.9 (ref 1.1–2.2)
ALP SERPL-CCNC: 116 U/L (ref 45–117)
ALT SERPL-CCNC: 43 U/L (ref 12–78)
ANION GAP BLD CALC-SCNC: 11
ANION GAP BLD CALC-SCNC: 14
ANION GAP SERPL CALC-SCNC: 24 MMOL/L (ref 2–12)
APPEARANCE UR: CLEAR
AST SERPL W P-5'-P-CCNC: 100 U/L (ref 15–37)
BACTERIA URNS QL MICRO: NEGATIVE /HPF
BASE DEFICIT BLD-SCNC: 14.4 MMOL/L
BASOPHILS # BLD: 0.04 K/UL (ref 0–0.1)
BASOPHILS NFR BLD: 0.2 % (ref 0–1)
BILIRUB SERPL-MCNC: 0.5 MG/DL (ref 0.2–1)
BILIRUB UR QL: NEGATIVE
BUN SERPL-MCNC: 22 MG/DL (ref 6–20)
BUN/CREAT SERPL: 13 (ref 12–20)
CA-I BLD-MCNC: 1.12 MMOL/L (ref 1.12–1.32)
CA-I BLD-MCNC: 1.18 MMOL/L (ref 1.12–1.32)
CA-I BLD-MCNC: 1.22 MMOL/L (ref 1.12–1.32)
CA-I BLD-MCNC: 11 MG/DL (ref 8.5–10.1)
CHLORIDE BLD-SCNC: 110 MMOL/L (ref 98–107)
CHLORIDE BLD-SCNC: 112 MMOL/L (ref 98–107)
CHLORIDE BLD-SCNC: 113 MMOL/L (ref 98–107)
CHLORIDE SERPL-SCNC: 111 MMOL/L (ref 97–108)
CO2 BLD-SCNC: 13 MMOL/L
CO2 BLD-SCNC: 22 MMOL/L
CO2 BLD-SCNC: 23 MMOL/L
CO2 SERPL-SCNC: 12 MMOL/L (ref 21–32)
COLOR UR: ABNORMAL
CREAT SERPL-MCNC: 1.66 MG/DL (ref 0.55–1.02)
CREAT UR-MCNC: 0.89 MG/DL (ref 0.6–1.3)
CREAT UR-MCNC: 1.07 MG/DL (ref 0.6–1.3)
CREAT UR-MCNC: 1.21 MG/DL (ref 0.6–1.3)
CRP SERPL-MCNC: 1.15 MG/DL (ref 0–0.3)
DIFFERENTIAL METHOD BLD: ABNORMAL
EKG ATRIAL RATE: 130 BPM
EKG DIAGNOSIS: NORMAL
EKG P AXIS: 57 DEGREES
EKG P-R INTERVAL: 126 MS
EKG Q-T INTERVAL: 316 MS
EKG QRS DURATION: 102 MS
EKG QTC CALCULATION (BAZETT): 465 MS
EKG R AXIS: 57 DEGREES
EKG T AXIS: -74 DEGREES
EKG VENTRICULAR RATE: 130 BPM
EOSINOPHIL # BLD: 0 K/UL (ref 0–0.4)
EOSINOPHIL NFR BLD: 0 % (ref 0–7)
EPITH CASTS URNS QL MICRO: ABNORMAL /LPF
ERYTHROCYTE [DISTWIDTH] IN BLOOD BY AUTOMATED COUNT: 12.8 % (ref 11.5–14.5)
FLUAV RNA SPEC QL NAA+PROBE: NOT DETECTED
FLUBV RNA SPEC QL NAA+PROBE: NOT DETECTED
GLOBULIN SER CALC-MCNC: 4.9 G/DL (ref 2–4)
GLUCOSE BLD STRIP.AUTO-MCNC: 108 MG/DL (ref 65–100)
GLUCOSE BLD STRIP.AUTO-MCNC: 234 MG/DL (ref 65–100)
GLUCOSE BLD STRIP.AUTO-MCNC: 38 MG/DL (ref 65–100)
GLUCOSE BLD STRIP.AUTO-MCNC: 64 MG/DL (ref 65–100)
GLUCOSE BLD STRIP.AUTO-MCNC: 76 MG/DL (ref 65–100)
GLUCOSE BLD STRIP.AUTO-MCNC: 77 MG/DL (ref 65–100)
GLUCOSE BLD STRIP.AUTO-MCNC: 98 MG/DL (ref 65–100)
GLUCOSE SERPL-MCNC: 39 MG/DL (ref 65–100)
GLUCOSE UR STRIP.AUTO-MCNC: 50 MG/DL
HCO3 BLD-SCNC: 13.5 MMOL/L (ref 19–28)
HCT VFR BLD AUTO: 45.7 % (ref 35–47)
HGB BLD-MCNC: 13.9 G/DL (ref 11.5–16)
HGB UR QL STRIP: ABNORMAL
HYALINE CASTS URNS QL MICRO: ABNORMAL /LPF (ref 0–5)
IMM GRANULOCYTES # BLD AUTO: 0.16 K/UL (ref 0–0.04)
IMM GRANULOCYTES NFR BLD AUTO: 0.7 % (ref 0–0.5)
KETONES UR QL STRIP.AUTO: 20 MG/DL
LACTATE BLD-SCNC: 1.53 MMOL/L (ref 0.4–2)
LACTATE BLD-SCNC: 16.66 MMOL/L (ref 0.4–2)
LACTATE BLD-SCNC: 4.26 MMOL/L (ref 0.4–2)
LEUKOCYTE ESTERASE UR QL STRIP.AUTO: ABNORMAL
LYMPHOCYTES # BLD: 2.56 K/UL (ref 0.8–3.5)
LYMPHOCYTES NFR BLD: 11.7 % (ref 12–49)
MCH RBC QN AUTO: 29.1 PG (ref 26–34)
MCHC RBC AUTO-ENTMCNC: 30.4 G/DL (ref 30–36.5)
MCV RBC AUTO: 95.8 FL (ref 80–99)
MONOCYTES # BLD: 1.41 K/UL (ref 0–1)
MONOCYTES NFR BLD: 6.4 % (ref 5–13)
MRSA DNA SPEC QL NAA+PROBE: DETECTED
MUCOUS THREADS URNS QL MICRO: ABNORMAL /LPF
NEUTS SEG # BLD: 17.73 K/UL (ref 1.8–8)
NEUTS SEG NFR BLD: 81 % (ref 32–75)
NITRITE UR QL STRIP.AUTO: NEGATIVE
NRBC # BLD: 0 K/UL (ref 0–0.01)
NRBC BLD-RTO: 0 PER 100 WBC
PCO2 BLD: 37.4 MMHG (ref 35–45)
PERFORMED BY:: ABNORMAL
PERFORMED BY:: NORMAL
PERFORMED BY:: NORMAL
PH BLD: 7.16 (ref 7.35–7.45)
PH UR STRIP: 5 (ref 5–8)
PLATELET # BLD AUTO: 361 K/UL (ref 150–400)
PMV BLD AUTO: 10.7 FL (ref 8.9–12.9)
PO2 BLD: 36 MMHG (ref 75–100)
POTASSIUM BLD-SCNC: 3.4 MMOL/L (ref 3.5–5.5)
POTASSIUM BLD-SCNC: 4.1 MMOL/L (ref 3.5–5.5)
POTASSIUM BLD-SCNC: 5.2 MMOL/L (ref 3.5–5.5)
POTASSIUM SERPL-SCNC: 5.8 MMOL/L (ref 3.5–5.1)
PROCALCITONIN SERPL-MCNC: 1.43 NG/ML
PROT SERPL-MCNC: 9.1 G/DL (ref 6.4–8.2)
PROT UR STRIP-MCNC: NEGATIVE MG/DL
RBC # BLD AUTO: 4.77 M/UL (ref 3.8–5.2)
RBC #/AREA URNS HPF: ABNORMAL /HPF (ref 0–5)
SAO2 % BLD: 55 %
SARS-COV-2 RNA RESP QL NAA+PROBE: DETECTED
SERVICE CMNT-IMP: ABNORMAL
SERVICE CMNT-IMP: ABNORMAL
SODIUM BLD-SCNC: 145 MMOL/L (ref 136–145)
SODIUM BLD-SCNC: 145 MMOL/L (ref 136–145)
SODIUM BLD-SCNC: 147 MMOL/L (ref 136–145)
SODIUM SERPL-SCNC: 147 MMOL/L (ref 136–145)
SP GR UR REFRACTOMETRY: 1.01 (ref 1–1.03)
SPECIMEN SITE: ABNORMAL
TROPONIN I SERPL HS-MCNC: 53 NG/L (ref 0–51)
URATE CRY URNS QL MICRO: ABNORMAL
URINE CULTURE IF INDICATED: ABNORMAL
UROBILINOGEN UR QL STRIP.AUTO: 0.1 EU/DL (ref 0.1–1)
WBC # BLD AUTO: 21.9 K/UL (ref 3.6–11)
WBC URNS QL MICRO: ABNORMAL /HPF (ref 0–4)

## 2025-03-14 PROCEDURE — 96361 HYDRATE IV INFUSION ADD-ON: CPT

## 2025-03-14 PROCEDURE — 85025 COMPLETE CBC W/AUTO DIFF WBC: CPT

## 2025-03-14 PROCEDURE — 82803 BLOOD GASES ANY COMBINATION: CPT

## 2025-03-14 PROCEDURE — 81001 URINALYSIS AUTO W/SCOPE: CPT

## 2025-03-14 PROCEDURE — 87641 MR-STAPH DNA AMP PROBE: CPT

## 2025-03-14 PROCEDURE — 86140 C-REACTIVE PROTEIN: CPT

## 2025-03-14 PROCEDURE — 80201 ASSAY OF TOPIRAMATE: CPT

## 2025-03-14 PROCEDURE — 6370000000 HC RX 637 (ALT 250 FOR IP): Performed by: STUDENT IN AN ORGANIZED HEALTH CARE EDUCATION/TRAINING PROGRAM

## 2025-03-14 PROCEDURE — 87636 SARSCOV2 & INF A&B AMP PRB: CPT

## 2025-03-14 PROCEDURE — XX20X89 MONITORING OF BRAIN ELECTRICAL ACTIVITY, COMPUTER-AIDED DETECTION AND NOTIFICATION, NEW TECHNOLOGY GROUP 9: ICD-10-PCS | Performed by: STUDENT IN AN ORGANIZED HEALTH CARE EDUCATION/TRAINING PROGRAM

## 2025-03-14 PROCEDURE — 82947 ASSAY GLUCOSE BLOOD QUANT: CPT

## 2025-03-14 PROCEDURE — 84295 ASSAY OF SERUM SODIUM: CPT

## 2025-03-14 PROCEDURE — 2700000000 HC OXYGEN THERAPY PER DAY

## 2025-03-14 PROCEDURE — 82330 ASSAY OF CALCIUM: CPT

## 2025-03-14 PROCEDURE — 80047 BASIC METABLC PNL IONIZED CA: CPT

## 2025-03-14 PROCEDURE — 96375 TX/PRO/DX INJ NEW DRUG ADDON: CPT

## 2025-03-14 PROCEDURE — 2580000003 HC RX 258: Performed by: STUDENT IN AN ORGANIZED HEALTH CARE EDUCATION/TRAINING PROGRAM

## 2025-03-14 PROCEDURE — 80177 DRUG SCRN QUAN LEVETIRACETAM: CPT

## 2025-03-14 PROCEDURE — 93005 ELECTROCARDIOGRAM TRACING: CPT | Performed by: STUDENT IN AN ORGANIZED HEALTH CARE EDUCATION/TRAINING PROGRAM

## 2025-03-14 PROCEDURE — 70450 CT HEAD/BRAIN W/O DYE: CPT

## 2025-03-14 PROCEDURE — 2500000003 HC RX 250 WO HCPCS: Performed by: STUDENT IN AN ORGANIZED HEALTH CARE EDUCATION/TRAINING PROGRAM

## 2025-03-14 PROCEDURE — 84484 ASSAY OF TROPONIN QUANT: CPT

## 2025-03-14 PROCEDURE — 82962 GLUCOSE BLOOD TEST: CPT

## 2025-03-14 PROCEDURE — 94761 N-INVAS EAR/PLS OXIMETRY MLT: CPT

## 2025-03-14 PROCEDURE — 87040 BLOOD CULTURE FOR BACTERIA: CPT

## 2025-03-14 PROCEDURE — 96374 THER/PROPH/DIAG INJ IV PUSH: CPT

## 2025-03-14 PROCEDURE — 99285 EMERGENCY DEPT VISIT HI MDM: CPT

## 2025-03-14 PROCEDURE — 2000000000 HC ICU R&B

## 2025-03-14 PROCEDURE — 83605 ASSAY OF LACTIC ACID: CPT

## 2025-03-14 PROCEDURE — 6360000002 HC RX W HCPCS: Performed by: STUDENT IN AN ORGANIZED HEALTH CARE EDUCATION/TRAINING PROGRAM

## 2025-03-14 PROCEDURE — 6360000002 HC RX W HCPCS

## 2025-03-14 PROCEDURE — 84132 ASSAY OF SERUM POTASSIUM: CPT

## 2025-03-14 PROCEDURE — 71045 X-RAY EXAM CHEST 1 VIEW: CPT

## 2025-03-14 PROCEDURE — 84145 PROCALCITONIN (PCT): CPT

## 2025-03-14 PROCEDURE — 80053 COMPREHEN METABOLIC PANEL: CPT

## 2025-03-14 RX ORDER — GLUCAGON 1 MG/ML
1 KIT INJECTION PRN
Status: DISCONTINUED | OUTPATIENT
Start: 2025-03-14 | End: 2025-03-16 | Stop reason: HOSPADM

## 2025-03-14 RX ORDER — ACETAMINOPHEN 650 MG/1
650 SUPPOSITORY RECTAL
Status: COMPLETED | OUTPATIENT
Start: 2025-03-14 | End: 2025-03-14

## 2025-03-14 RX ORDER — DEXTROSE MONOHYDRATE 100 MG/ML
INJECTION, SOLUTION INTRAVENOUS CONTINUOUS PRN
Status: DISCONTINUED | OUTPATIENT
Start: 2025-03-14 | End: 2025-03-16 | Stop reason: HOSPADM

## 2025-03-14 RX ORDER — LORAZEPAM 2 MG/ML
0.5 INJECTION INTRAMUSCULAR EVERY 6 HOURS PRN
Status: DISCONTINUED | OUTPATIENT
Start: 2025-03-14 | End: 2025-03-16 | Stop reason: HOSPADM

## 2025-03-14 RX ORDER — 0.9 % SODIUM CHLORIDE 0.9 %
30 INTRAVENOUS SOLUTION INTRAVENOUS ONCE
Status: COMPLETED | OUTPATIENT
Start: 2025-03-14 | End: 2025-03-14

## 2025-03-14 RX ORDER — 0.9 % SODIUM CHLORIDE 0.9 %
1000 INTRAVENOUS SOLUTION INTRAVENOUS ONCE
Status: DISCONTINUED | OUTPATIENT
Start: 2025-03-14 | End: 2025-03-14 | Stop reason: DRUGHIGH

## 2025-03-14 RX ORDER — LORAZEPAM 2 MG/ML
INJECTION INTRAMUSCULAR
Status: DISPENSED
Start: 2025-03-14 | End: 2025-03-15

## 2025-03-14 RX ORDER — SODIUM CHLORIDE 0.9 % (FLUSH) 0.9 %
5-40 SYRINGE (ML) INJECTION EVERY 12 HOURS SCHEDULED
Status: DISCONTINUED | OUTPATIENT
Start: 2025-03-14 | End: 2025-03-16 | Stop reason: HOSPADM

## 2025-03-14 RX ORDER — LORAZEPAM 2 MG/ML
2 INJECTION INTRAMUSCULAR
Status: ACTIVE | OUTPATIENT
Start: 2025-03-14 | End: 2025-03-15

## 2025-03-14 RX ORDER — INSULIN LISPRO 100 [IU]/ML
0-8 INJECTION, SOLUTION INTRAVENOUS; SUBCUTANEOUS
Status: DISCONTINUED | OUTPATIENT
Start: 2025-03-14 | End: 2025-03-16 | Stop reason: HOSPADM

## 2025-03-14 RX ORDER — TOPIRAMATE 200 MG/1
200 TABLET, FILM COATED ORAL 2 TIMES DAILY
Status: ON HOLD | COMMUNITY
End: 2025-03-16 | Stop reason: HOSPADM

## 2025-03-14 RX ORDER — DEXTROSE MONOHYDRATE AND SODIUM CHLORIDE 5; .9 G/100ML; G/100ML
INJECTION, SOLUTION INTRAVENOUS CONTINUOUS
Status: DISCONTINUED | OUTPATIENT
Start: 2025-03-14 | End: 2025-03-15

## 2025-03-14 RX ORDER — LEVETIRACETAM 500 MG/5ML
1000 INJECTION, SOLUTION, CONCENTRATE INTRAVENOUS ONCE
Status: COMPLETED | OUTPATIENT
Start: 2025-03-14 | End: 2025-03-14

## 2025-03-14 RX ORDER — SODIUM CHLORIDE 9 MG/ML
INJECTION, SOLUTION INTRAVENOUS PRN
Status: DISCONTINUED | OUTPATIENT
Start: 2025-03-14 | End: 2025-03-16 | Stop reason: HOSPADM

## 2025-03-14 RX ORDER — ACETAMINOPHEN 325 MG/1
650 TABLET ORAL EVERY 6 HOURS PRN
Status: DISCONTINUED | OUTPATIENT
Start: 2025-03-14 | End: 2025-03-16 | Stop reason: HOSPADM

## 2025-03-14 RX ORDER — POLYETHYLENE GLYCOL 3350 17 G/17G
17 POWDER, FOR SOLUTION ORAL DAILY PRN
Status: DISCONTINUED | OUTPATIENT
Start: 2025-03-14 | End: 2025-03-16 | Stop reason: HOSPADM

## 2025-03-14 RX ORDER — LEVETIRACETAM 500 MG/1
1500 TABLET ORAL EVERY 12 HOURS
Status: ON HOLD | COMMUNITY
End: 2025-03-16 | Stop reason: HOSPADM

## 2025-03-14 RX ORDER — SODIUM CHLORIDE, SODIUM LACTATE, POTASSIUM CHLORIDE, CALCIUM CHLORIDE 600; 310; 30; 20 MG/100ML; MG/100ML; MG/100ML; MG/100ML
INJECTION, SOLUTION INTRAVENOUS CONTINUOUS
Status: DISCONTINUED | OUTPATIENT
Start: 2025-03-14 | End: 2025-03-14

## 2025-03-14 RX ORDER — TOPIRAMATE 200 MG/1
200 TABLET, FILM COATED ORAL 2 TIMES DAILY
Status: DISCONTINUED | OUTPATIENT
Start: 2025-03-14 | End: 2025-03-16 | Stop reason: HOSPADM

## 2025-03-14 RX ORDER — ACETAMINOPHEN 650 MG/1
650 SUPPOSITORY RECTAL EVERY 6 HOURS PRN
Status: DISCONTINUED | OUTPATIENT
Start: 2025-03-14 | End: 2025-03-16 | Stop reason: HOSPADM

## 2025-03-14 RX ORDER — LORAZEPAM 2 MG/ML
2 INJECTION INTRAMUSCULAR ONCE
Status: COMPLETED | OUTPATIENT
Start: 2025-03-14 | End: 2025-03-14

## 2025-03-14 RX ORDER — ONDANSETRON 2 MG/ML
4 INJECTION INTRAMUSCULAR; INTRAVENOUS EVERY 6 HOURS PRN
Status: DISCONTINUED | OUTPATIENT
Start: 2025-03-14 | End: 2025-03-16 | Stop reason: HOSPADM

## 2025-03-14 RX ORDER — METHYLPREDNISOLONE SODIUM SUCCINATE 40 MG/ML
40 INJECTION INTRAMUSCULAR; INTRAVENOUS DAILY
Status: COMPLETED | OUTPATIENT
Start: 2025-03-14 | End: 2025-03-16

## 2025-03-14 RX ORDER — ENOXAPARIN SODIUM 100 MG/ML
30 INJECTION SUBCUTANEOUS DAILY
Status: DISCONTINUED | OUTPATIENT
Start: 2025-03-14 | End: 2025-03-16 | Stop reason: HOSPADM

## 2025-03-14 RX ORDER — LORAZEPAM 2 MG/ML
INJECTION INTRAMUSCULAR
Status: COMPLETED
Start: 2025-03-14 | End: 2025-03-14

## 2025-03-14 RX ORDER — ALBUTEROL SULFATE 5 MG/ML
2.5 SOLUTION RESPIRATORY (INHALATION)
Status: DISCONTINUED | OUTPATIENT
Start: 2025-03-14 | End: 2025-03-16 | Stop reason: HOSPADM

## 2025-03-14 RX ORDER — SODIUM CHLORIDE 0.9 % (FLUSH) 0.9 %
5-40 SYRINGE (ML) INJECTION PRN
Status: DISCONTINUED | OUTPATIENT
Start: 2025-03-14 | End: 2025-03-16 | Stop reason: HOSPADM

## 2025-03-14 RX ORDER — LEVETIRACETAM 500 MG/5ML
1500 INJECTION, SOLUTION, CONCENTRATE INTRAVENOUS EVERY 12 HOURS
Status: DISCONTINUED | OUTPATIENT
Start: 2025-03-14 | End: 2025-03-15

## 2025-03-14 RX ORDER — ONDANSETRON 4 MG/1
4 TABLET, ORALLY DISINTEGRATING ORAL EVERY 8 HOURS PRN
Status: DISCONTINUED | OUTPATIENT
Start: 2025-03-14 | End: 2025-03-16 | Stop reason: HOSPADM

## 2025-03-14 RX ORDER — DEXTROSE MONOHYDRATE 50 MG/ML
INJECTION, SOLUTION INTRAVENOUS CONTINUOUS
Status: DISCONTINUED | OUTPATIENT
Start: 2025-03-14 | End: 2025-03-14

## 2025-03-14 RX ADMIN — SODIUM CHLORIDE 1059 ML: 0.9 INJECTION, SOLUTION INTRAVENOUS at 12:31

## 2025-03-14 RX ADMIN — DEXTROSE AND SODIUM CHLORIDE: 5; .9 INJECTION, SOLUTION INTRAVENOUS at 14:14

## 2025-03-14 RX ADMIN — SODIUM CHLORIDE, PRESERVATIVE FREE 10 ML: 5 INJECTION INTRAVENOUS at 20:06

## 2025-03-14 RX ADMIN — LORAZEPAM 2 MG: 2 INJECTION INTRAMUSCULAR; INTRAVENOUS at 12:00

## 2025-03-14 RX ADMIN — ENOXAPARIN SODIUM 30 MG: 100 INJECTION SUBCUTANEOUS at 18:05

## 2025-03-14 RX ADMIN — DEXTROSE MONOHYDRATE 250 ML: 100 INJECTION, SOLUTION INTRAVENOUS at 12:06

## 2025-03-14 RX ADMIN — LEVETIRACETAM 1500 MG: 100 INJECTION INTRAVENOUS at 16:47

## 2025-03-14 RX ADMIN — DEXTROSE AND SODIUM CHLORIDE: 5; .9 INJECTION, SOLUTION INTRAVENOUS at 22:48

## 2025-03-14 RX ADMIN — LORAZEPAM 2 MG: 2 INJECTION INTRAMUSCULAR at 12:00

## 2025-03-14 RX ADMIN — METHYLPREDNISOLONE SODIUM SUCCINATE 40 MG: 40 INJECTION INTRAMUSCULAR; INTRAVENOUS at 20:18

## 2025-03-14 RX ADMIN — CEFTRIAXONE SODIUM 1000 MG: 1 INJECTION, POWDER, FOR SOLUTION INTRAMUSCULAR; INTRAVENOUS at 12:31

## 2025-03-14 RX ADMIN — ACETAMINOPHEN 650 MG: 650 SUPPOSITORY RECTAL at 13:38

## 2025-03-14 RX ADMIN — LEVETIRACETAM 1000 MG: 100 INJECTION INTRAVENOUS at 12:03

## 2025-03-14 ASSESSMENT — PAIN - FUNCTIONAL ASSESSMENT
PAIN_FUNCTIONAL_ASSESSMENT: CRITICAL CARE PAIN OBSERVATION TOOL (CPOT)
PAIN_FUNCTIONAL_ASSESSMENT: CRITICAL CARE PAIN OBSERVATION TOOL (CPOT)

## 2025-03-14 ASSESSMENT — PAIN SCALES - GENERAL: PAINLEVEL_OUTOF10: 0

## 2025-03-14 NOTE — ED NOTES
Seizure began, lasting about 30 seconds. MD at bedside. 2mg ativan ordered. Following seizure activity, MD states to hold ativan at this time, administer if another seizure occurs

## 2025-03-14 NOTE — ED NOTES
Report given to ICU RN Graciela at bedside. Pt transported to ICU with EDT and ICU RN on [employer]

## 2025-03-14 NOTE — ED PROVIDER NOTES
[facility] EMERGENCY DEPT  EMERGENCY DEPARTMENT HISTORY AND PHYSICAL EXAM      Date: 3/14/2025  Patient Name: Umm Curtis  MRN: [id number]  YOB: 1990  Date of evaluation: 3/14/2025  Provider: Reid Lorenzo MD   Note Started: 1:14 PM EDT 3/14/25    HISTORY OF PRESENT ILLNESS     Chief Complaint   Patient presents with    Seizures    Unresponsive       History Provided By: Father    HPI: Umm Curtis is a 34 y.o. female history of Angelman syndrome, seizure disorder, presents with father for evaluation of altered mentation, recurrent seizures.  Patient at baseline communicates with parents with minimal verbiage, sign language due to chronic developmental disorder.  Reportedly has a history of seizures as recurrent seizures daily, yesterday evening father did note mild cough, this a.m. awoke and found patient unresponsive in her wheelchair, was concerned about possible respiratory arrest states he did feel a pulse however took her to the floor and started chest compressions.  Called EMS, EMS started mentioning advanced directives and possibly DNR with father who got very upset asked EMS to leave and drove patient himself to the emergency department.  Patient actively seizing when taken from car to ED.  On my assessment patient nonverbal, postictal.  Father denies any fevers chills, nausea or vomiting over the last couple days, states she was unable to take her seizure medicine yesterday evening or this morning.  Is on Tegretol and Keppra    PAST MEDICAL HISTORY   Past Medical History:  Past Medical History:   Diagnosis Date    Angelman's syndrome     Seizure (HCC)     Seizures (HCC)        Past Surgical History:  Past Surgical History:   Procedure Laterality Date    PATENT DUCTUS ARTERIOUS LIGATION      VAGAL NERVE STIMULATOR REMOVAL         Family History:  History reviewed. No pertinent family history.    Social History:  Social History     Tobacco Use    Smoking status: Never    Smokeless tobacco: Never

## 2025-03-14 NOTE — ED TRIAGE NOTES
Presents with caregiver. Found unresponsive since 1000 per caregiver in wheelchair  Hx Angelman syndrome and seizures          1157 - Seizing in triage - seizure ended 1158

## 2025-03-14 NOTE — H&P
CRITICAL CARE ADMISSION NOTE    Name: Umm Curtis   : 1990   MRN: [id number]   Date: 3/14/2025      Diagnoses/problem list:   Angelman syndrome  Epilepsy  Ligated PDA  Vagus nerve stimulant implant    HPI   Umm Curtis is a 34 y.o. female history of Angelman syndrome, seizure disorder, presents with father for evaluation of altered mentation, recurrent seizures.  Patient at baseline communicates with parents with minimal verbiage, sign language due to chronic developmental disorder.  Reportedly has a history of seizures as recurrent seizures daily, yesterday evening father did note mild cough, this a.m. awoke and found patient unresponsive in her wheelchair, was concerned about possible respiratory arrest states he did feel a pulse however took her to the floor and started chest compressions.  Called EMS, EMS started mentioning advanced directives and possibly DNR with father who got very upset asked EMS to leave and drove patient himself to the emergency department.  Patient actively seizing when taken from car to ED.  On my assessment patient nonverbal, postictal.  Father denies any fevers chills, nausea or vomiting over the last couple days, states she was unable to take her seizure medicine yesterday evening or this morning.  Is on Tegretol and Keppra.  In the ED she had 3 more seizures and was given ativan. In addition, she also is COVID positive. ICU has been requested for admission for 24 hour observation and seizure medication adjustments.     ROS negative except as otherwise documented.    Assessment and plan:     NEUROLOGICAL:    Angelman syndrome  Epilepsy  -repeated seizures  -will order rapid EEG for continued monitoring  -tele-neurology consult placed  -Keppra 1500mg IV and topamax per home medication list  -will order CT for evaluation due to >60 seizures reported per father in the last 24 hours    PULMONOLOGY:   Acute hypoxia  COVID pneumonia  - maintain SpO2>=92, pH>=7.30  - currently on

## 2025-03-15 ENCOUNTER — APPOINTMENT (OUTPATIENT)
Facility: HOSPITAL | Age: 35
DRG: 053 | End: 2025-03-15
Payer: MEDICAID

## 2025-03-15 LAB
ANION GAP SERPL CALC-SCNC: 5 MMOL/L (ref 2–12)
BASOPHILS # BLD: 0.01 K/UL (ref 0–0.1)
BASOPHILS NFR BLD: 0.1 % (ref 0–1)
BUN SERPL-MCNC: 12 MG/DL (ref 6–20)
BUN/CREAT SERPL: 15 (ref 12–20)
CA-I BLD-MCNC: 8.4 MG/DL (ref 8.5–10.1)
CHLORIDE SERPL-SCNC: 113 MMOL/L (ref 97–108)
CO2 SERPL-SCNC: 24 MMOL/L (ref 21–32)
CREAT SERPL-MCNC: 0.8 MG/DL (ref 0.55–1.02)
CRP SERPL-MCNC: 4.1 MG/DL (ref 0–0.3)
DIFFERENTIAL METHOD BLD: ABNORMAL
EOSINOPHIL # BLD: 0 K/UL (ref 0–0.4)
EOSINOPHIL NFR BLD: 0 % (ref 0–7)
ERYTHROCYTE [DISTWIDTH] IN BLOOD BY AUTOMATED COUNT: 12.8 % (ref 11.5–14.5)
GLUCOSE BLD STRIP.AUTO-MCNC: 126 MG/DL (ref 65–100)
GLUCOSE BLD STRIP.AUTO-MCNC: 126 MG/DL (ref 65–100)
GLUCOSE BLD STRIP.AUTO-MCNC: 140 MG/DL (ref 65–100)
GLUCOSE BLD STRIP.AUTO-MCNC: 148 MG/DL (ref 65–100)
GLUCOSE BLD STRIP.AUTO-MCNC: 191 MG/DL (ref 65–100)
GLUCOSE SERPL-MCNC: 140 MG/DL (ref 65–100)
HCT VFR BLD AUTO: 34.6 % (ref 35–47)
HGB BLD-MCNC: 10.9 G/DL (ref 11.5–16)
IMM GRANULOCYTES # BLD AUTO: 0.03 K/UL (ref 0–0.04)
IMM GRANULOCYTES NFR BLD AUTO: 0.3 % (ref 0–0.5)
LYMPHOCYTES # BLD: 0.84 K/UL (ref 0.8–3.5)
LYMPHOCYTES NFR BLD: 8.4 % (ref 12–49)
MAGNESIUM SERPL-MCNC: 2.1 MG/DL (ref 1.6–2.4)
MCH RBC QN AUTO: 29.1 PG (ref 26–34)
MCHC RBC AUTO-ENTMCNC: 31.5 G/DL (ref 30–36.5)
MCV RBC AUTO: 92.5 FL (ref 80–99)
MONOCYTES # BLD: 0.19 K/UL (ref 0–1)
MONOCYTES NFR BLD: 1.9 % (ref 5–13)
NEUTS SEG # BLD: 8.89 K/UL (ref 1.8–8)
NEUTS SEG NFR BLD: 89.3 % (ref 32–75)
NRBC # BLD: 0 K/UL (ref 0–0.01)
NRBC BLD-RTO: 0 PER 100 WBC
PERFORMED BY:: ABNORMAL
PHOSPHATE SERPL-MCNC: 3.2 MG/DL (ref 2.6–4.7)
PLATELET # BLD AUTO: 243 K/UL (ref 150–400)
PMV BLD AUTO: 10.8 FL (ref 8.9–12.9)
POTASSIUM SERPL-SCNC: 4.4 MMOL/L (ref 3.5–5.1)
PROCALCITONIN SERPL-MCNC: 1.64 NG/ML
RBC # BLD AUTO: 3.74 M/UL (ref 3.8–5.2)
SODIUM SERPL-SCNC: 142 MMOL/L (ref 136–145)
WBC # BLD AUTO: 10 K/UL (ref 3.6–11)

## 2025-03-15 PROCEDURE — 80048 BASIC METABOLIC PNL TOTAL CA: CPT

## 2025-03-15 PROCEDURE — 82962 GLUCOSE BLOOD TEST: CPT

## 2025-03-15 PROCEDURE — 6360000002 HC RX W HCPCS: Performed by: STUDENT IN AN ORGANIZED HEALTH CARE EDUCATION/TRAINING PROGRAM

## 2025-03-15 PROCEDURE — 3E033XZ INTRODUCTION OF VASOPRESSOR INTO PERIPHERAL VEIN, PERCUTANEOUS APPROACH: ICD-10-PCS | Performed by: STUDENT IN AN ORGANIZED HEALTH CARE EDUCATION/TRAINING PROGRAM

## 2025-03-15 PROCEDURE — 2500000003 HC RX 250 WO HCPCS: Performed by: STUDENT IN AN ORGANIZED HEALTH CARE EDUCATION/TRAINING PROGRAM

## 2025-03-15 PROCEDURE — 74018 RADEX ABDOMEN 1 VIEW: CPT

## 2025-03-15 PROCEDURE — 85025 COMPLETE CBC W/AUTO DIFF WBC: CPT

## 2025-03-15 PROCEDURE — 2000000000 HC ICU R&B

## 2025-03-15 PROCEDURE — 84100 ASSAY OF PHOSPHORUS: CPT

## 2025-03-15 PROCEDURE — 2580000003 HC RX 258: Performed by: STUDENT IN AN ORGANIZED HEALTH CARE EDUCATION/TRAINING PROGRAM

## 2025-03-15 PROCEDURE — 86140 C-REACTIVE PROTEIN: CPT

## 2025-03-15 PROCEDURE — 84145 PROCALCITONIN (PCT): CPT

## 2025-03-15 PROCEDURE — P9045 ALBUMIN (HUMAN), 5%, 250 ML: HCPCS | Performed by: STUDENT IN AN ORGANIZED HEALTH CARE EDUCATION/TRAINING PROGRAM

## 2025-03-15 PROCEDURE — 94761 N-INVAS EAR/PLS OXIMETRY MLT: CPT

## 2025-03-15 PROCEDURE — 83735 ASSAY OF MAGNESIUM: CPT

## 2025-03-15 PROCEDURE — 6370000000 HC RX 637 (ALT 250 FOR IP): Performed by: STUDENT IN AN ORGANIZED HEALTH CARE EDUCATION/TRAINING PROGRAM

## 2025-03-15 RX ORDER — LEVETIRACETAM 500 MG/5ML
2000 INJECTION, SOLUTION, CONCENTRATE INTRAVENOUS EVERY 12 HOURS
Status: DISCONTINUED | OUTPATIENT
Start: 2025-03-15 | End: 2025-03-16

## 2025-03-15 RX ORDER — SODIUM CHLORIDE, SODIUM LACTATE, POTASSIUM CHLORIDE, CALCIUM CHLORIDE 600; 310; 30; 20 MG/100ML; MG/100ML; MG/100ML; MG/100ML
INJECTION, SOLUTION INTRAVENOUS CONTINUOUS
Status: DISCONTINUED | OUTPATIENT
Start: 2025-03-15 | End: 2025-03-16

## 2025-03-15 RX ORDER — ALBUMIN HUMAN 50 G/1000ML
25 SOLUTION INTRAVENOUS ONCE
Status: COMPLETED | OUTPATIENT
Start: 2025-03-15 | End: 2025-03-15

## 2025-03-15 RX ORDER — NOREPINEPHRINE BITARTRATE 0.06 MG/ML
1-100 INJECTION, SOLUTION INTRAVENOUS CONTINUOUS
Status: DISCONTINUED | OUTPATIENT
Start: 2025-03-15 | End: 2025-03-16

## 2025-03-15 RX ADMIN — TOPIRAMATE 200 MG: 200 TABLET, FILM COATED ORAL at 20:32

## 2025-03-15 RX ADMIN — ALBUMIN (HUMAN) 25 G: 12.5 INJECTION, SOLUTION INTRAVENOUS at 08:36

## 2025-03-15 RX ADMIN — SODIUM CHLORIDE, PRESERVATIVE FREE 10 ML: 5 INJECTION INTRAVENOUS at 09:39

## 2025-03-15 RX ADMIN — DEXTROSE AND SODIUM CHLORIDE: 5; .9 INJECTION, SOLUTION INTRAVENOUS at 08:26

## 2025-03-15 RX ADMIN — METHYLPREDNISOLONE SODIUM SUCCINATE 40 MG: 40 INJECTION INTRAMUSCULAR; INTRAVENOUS at 08:31

## 2025-03-15 RX ADMIN — TOPIRAMATE 200 MG: 200 TABLET, FILM COATED ORAL at 08:39

## 2025-03-15 RX ADMIN — ENOXAPARIN SODIUM 30 MG: 100 INJECTION SUBCUTANEOUS at 08:37

## 2025-03-15 RX ADMIN — LEVETIRACETAM 2000 MG: 100 INJECTION INTRAVENOUS at 14:31

## 2025-03-15 RX ADMIN — CEFTRIAXONE SODIUM 1000 MG: 1 INJECTION, POWDER, FOR SOLUTION INTRAMUSCULAR; INTRAVENOUS at 23:49

## 2025-03-15 RX ADMIN — SODIUM CHLORIDE, POTASSIUM CHLORIDE, SODIUM LACTATE AND CALCIUM CHLORIDE: 600; 310; 30; 20 INJECTION, SOLUTION INTRAVENOUS at 14:43

## 2025-03-15 RX ADMIN — INSULIN LISPRO 2 UNITS: 100 INJECTION, SOLUTION INTRAVENOUS; SUBCUTANEOUS at 06:11

## 2025-03-15 RX ADMIN — SODIUM CHLORIDE, PRESERVATIVE FREE 10 ML: 5 INJECTION INTRAVENOUS at 20:28

## 2025-03-15 RX ADMIN — Medication 5 MCG/MIN: at 09:38

## 2025-03-15 RX ADMIN — BARICITINIB 2 MG: 2 TABLET, FILM COATED ORAL at 09:44

## 2025-03-15 RX ADMIN — CEFTRIAXONE SODIUM 1000 MG: 1 INJECTION, POWDER, FOR SOLUTION INTRAMUSCULAR; INTRAVENOUS at 00:10

## 2025-03-15 RX ADMIN — LEVETIRACETAM 1500 MG: 100 INJECTION INTRAVENOUS at 03:00

## 2025-03-15 ASSESSMENT — PAIN SCALES - GENERAL
PAINLEVEL_OUTOF10: 0
PAINLEVEL_OUTOF10: 2

## 2025-03-15 NOTE — PROGRESS NOTES
CRITICAL CARE PROGRESS NOTE    Name: Umm Curtis   : 1990   MRN: [id number]   Date: 3/15/2025      Diagnoses/problem list:   Angelman syndrome  Epilepsy  Ligated PDA  Vagus nerve stimulant implant    24-hour events:   Umm Curtis is a 34 y.o. female history of Angelman syndrome, seizure disorder, presents with father for evaluation of altered mentation, recurrent seizures.  Patient at baseline communicates with parents with minimal verbiage, sign language due to chronic developmental disorder.  Reportedly has a history of seizures as recurrent seizures daily, yesterday evening father did note mild cough, this a.m. awoke and found patient unresponsive in her wheelchair, was concerned about possible respiratory arrest states he did feel a pulse however took her to the floor and started chest compressions.  Called EMS, EMS started mentioning advanced directives and possibly DNR with father who got very upset asked EMS to leave and drove patient himself to the emergency department.  Patient actively seizing when taken from car to ED.  On my assessment patient nonverbal, postictal.  Father denies any fevers chills, nausea or vomiting over the last couple days, states she was unable to take her seizure medicine yesterday evening or this morning.  Is on Tegretol and Keppra.  In the ED she had 3 more seizures and was given ativan. In addition, she also is COVID positive. ICU has been requested for admission for 24 hour observation and seizure medication adjustments.     3/15: hypotensive this morning and added albumin and NE. Able to advance diet and patient more alert and reportedly at baseline. Teleneurology recommended increasing Keppra.    ROS negative except as otherwise documented.     Assessment and plan:     NEUROLOGICAL:    Angelman syndrome  Epilepsy  -repeated seizures  -Rapid EEG negative for repeated seizure  -tele-neurology consult placed and recommend increase Keppra to 2000mg BID and continue

## 2025-03-15 NOTE — PLAN OF CARE
Problem: Safety - Adult  Goal: Free from fall injury  3/14/2025 2125 by Rajeev Oconnor RN  Outcome: Progressing  3/14/2025 1710 by Graciela Vasquez RN  Outcome: Progressing     Problem: Discharge Planning  Goal: Discharge to home or other facility with appropriate resources  Outcome: Progressing  Flowsheets  Taken 3/14/2025 2000 by Rajeev Oconnor RN  Discharge to home or other facility with appropriate resources:   Identify barriers to discharge with patient and caregiver   Arrange for needed discharge resources and transportation as appropriate   Identify discharge learning needs (meds, wound care, etc)   Arrange for interpreters to assist at discharge as needed   Refer to discharge planning if patient needs post-hospital services based on physician order or complex needs related to functional status, cognitive ability or social support system  Taken 3/14/2025 1600 by Graciela Vasquez RN  Discharge to home or other facility with appropriate resources:   Identify barriers to discharge with patient and caregiver   Arrange for needed discharge resources and transportation as appropriate   Identify discharge learning needs (meds, wound care, etc)   Arrange for interpreters to assist at discharge as needed   Refer to discharge planning if patient needs post-hospital services based on physician order or complex needs related to functional status, cognitive ability or social support system     Problem: Pain  Goal: Verbalizes/displays adequate comfort level or baseline comfort level  Outcome: Progressing  Flowsheets (Taken 3/14/2025 2000)  Verbalizes/displays adequate comfort level or baseline comfort level: Encourage patient to monitor pain and request assistance     Problem: Skin/Tissue Integrity  Goal: Skin integrity remains intact  Description: 1.  Monitor for areas of redness and/or skin breakdown  2.  Assess vascular access sites hourly  3.  Every 4-6 hours minimum:  Change oxygen saturation probe site  4.  Every

## 2025-03-15 NOTE — CONSULTS
[facility] EEG results uploaded under tabs Chart Review, Media.  
  Result Value Ref Range    Troponin, High Sensitivity 53 (H) 0 - 51 ng/L   COVID-19 & Influenza Combo    Collection Time: 03/14/25 12:06 PM    Specimen: Nasopharyngeal   Result Value Ref Range    SARS-CoV-2, PCR DETECTED (A) Not Detected      Rapid Influenza A By PCR Not Detected Not Detected      Rapid Influenza B By PCR Not Detected Not Detected     CBC with Auto Differential    Collection Time: 03/14/25 12:07 PM   Result Value Ref Range    WBC 21.9 (H) 3.6 - 11.0 K/uL    RBC 4.77 3.80 - 5.20 M/uL    Hemoglobin 13.9 11.5 - 16.0 g/dL    Hematocrit 45.7 35.0 - 47.0 %    MCV 95.8 80.0 - 99.0 FL    MCH 29.1 26.0 - 34.0 PG    MCHC 30.4 30.0 - 36.5 g/dL    RDW 12.8 11.5 - 14.5 %    Platelets 361 150 - 400 K/uL    MPV 10.7 8.9 - 12.9 FL    Nucleated RBCs 0.0 0.0  WBC    nRBC 0.00 0.00 - 0.01 K/uL    Neutrophils % 81.0 (H) 32.0 - 75.0 %    Lymphocytes % 11.7 (L) 12.0 - 49.0 %    Monocytes % 6.4 5.0 - 13.0 %    Eosinophils % 0.0 0.0 - 7.0 %    Basophils % 0.2 0.0 - 1.0 %    Immature Granulocytes % 0.7 (H) 0 - 0.5 %    Neutrophils Absolute 17.73 (H) 1.80 - 8.00 K/UL    Lymphocytes Absolute 2.56 0.80 - 3.50 K/UL    Monocytes Absolute 1.41 (H) 0.00 - 1.00 K/UL    Eosinophils Absolute 0.00 0.00 - 0.40 K/UL    Basophils Absolute 0.04 0.00 - 0.10 K/UL    Immature Granulocytes Absolute 0.16 (H) 0.00 - 0.04 K/UL    Differential Type AUTOMATED     Comprehensive Metabolic Panel    Collection Time: 03/14/25 12:07 PM   Result Value Ref Range    Sodium 147 (H) 136 - 145 mmol/L    Potassium 5.8 (H) 3.5 - 5.1 mmol/L    Chloride 111 (H) 97 - 108 mmol/L    CO2 12 (LL) 21 - 32 mmol/L    Anion Gap 24 (H) 2 - 12 mmol/L    Glucose 39 (LL) 65 - 100 mg/dL    BUN 22 (H) 6 - 20 mg/dL    Creatinine 1.66 (H) 0.55 - 1.02 mg/dL    BUN/Creatinine Ratio 13 12 - 20      Est, Glom Filt Rate 42 (L) >60 ml/min/1.73m2    Calcium 11.0 (H) 8.5 - 10.1 mg/dL    Total Bilirubin 0.5 0.2 - 1.0 mg/dL     (H) 15 - 37 U/L    ALT 43 12 - 78 U/L    Alk

## 2025-03-15 NOTE — CARE COORDINATION
03/15/25 1621   Service Assessment   Patient Orientation Unable to Assess   Cognition Alert   History Provided By Child/Family   Primary Caregiver Family   Support Systems Family Members   Patient's Healthcare Decision Maker is: Legal Next of Kin   PCP Verified by CM Yes   Last Visit to PCP Within last 3 months   Prior Functional Level Assistance with the following:;Mobility;Dressing   Current Functional Level Assistance with the following:;Dressing;Mobility   Can patient return to prior living arrangement Yes   Ability to make needs known: Unable   Family able to assist with home care needs: Yes   Would you like for me to discuss the discharge plan with any other family members/significant others, and if so, who? No   Financial Resources Medicaid   Community Resources None   Social/Functional History   Lives With Family     Mother provided information. Confirmed demographic. Patient is nonverbal. Family will transport when discharged. Mother is caregiver. DME: hospital bed, WC, helmet. Current disp: Home with family.    Advance Care Planning     General Advance Care Planning (ACP) Conversation    Date of Conversation: 3/15/2025  Conducted with: Legal next of kin  Other persons present: Mother    Healthcare Decision Maker: No healthcare decision makers have been documented.     Today we documented Decision Maker(s) consistent with Legal Next of Kin hierarchy.  Content/Action Overview:  Has ACP document(s) on file - reflects the patient's care preferences  Reviewed DNR/DNI and patient elects Full Code (Attempt Resuscitation)    Length of Voluntary ACP Conversation in minutes:  <16 minutes (Non-Billable)    Sara Motley RN

## 2025-03-16 VITALS
TEMPERATURE: 97.8 F | WEIGHT: 91.49 LBS | BODY MASS INDEX: 19.74 KG/M2 | OXYGEN SATURATION: 93 % | SYSTOLIC BLOOD PRESSURE: 98 MMHG | HEIGHT: 57 IN | HEART RATE: 104 BPM | RESPIRATION RATE: 19 BRPM | DIASTOLIC BLOOD PRESSURE: 83 MMHG

## 2025-03-16 LAB
ANION GAP SERPL CALC-SCNC: 2 MMOL/L (ref 2–12)
BASOPHILS # BLD: 0.02 K/UL (ref 0–0.1)
BASOPHILS NFR BLD: 0.2 % (ref 0–1)
BUN SERPL-MCNC: 8 MG/DL (ref 6–20)
BUN/CREAT SERPL: 9 (ref 12–20)
CA-I BLD-MCNC: 9 MG/DL (ref 8.5–10.1)
CHLORIDE SERPL-SCNC: 117 MMOL/L (ref 97–108)
CO2 SERPL-SCNC: 27 MMOL/L (ref 21–32)
CREAT SERPL-MCNC: 0.94 MG/DL (ref 0.55–1.02)
DIFFERENTIAL METHOD BLD: ABNORMAL
EOSINOPHIL # BLD: 0.01 K/UL (ref 0–0.4)
EOSINOPHIL NFR BLD: 0.1 % (ref 0–7)
ERYTHROCYTE [DISTWIDTH] IN BLOOD BY AUTOMATED COUNT: 13 % (ref 11.5–14.5)
GLUCOSE BLD STRIP.AUTO-MCNC: 100 MG/DL (ref 65–100)
GLUCOSE BLD STRIP.AUTO-MCNC: 110 MG/DL (ref 65–100)
GLUCOSE SERPL-MCNC: 102 MG/DL (ref 65–100)
HCT VFR BLD AUTO: 33.8 % (ref 35–47)
HGB BLD-MCNC: 10.7 G/DL (ref 11.5–16)
IMM GRANULOCYTES # BLD AUTO: 0.02 K/UL (ref 0–0.04)
IMM GRANULOCYTES NFR BLD AUTO: 0.2 % (ref 0–0.5)
LYMPHOCYTES # BLD: 3.15 K/UL (ref 0.8–3.5)
LYMPHOCYTES NFR BLD: 30 % (ref 12–49)
MAGNESIUM SERPL-MCNC: 2 MG/DL (ref 1.6–2.4)
MCH RBC QN AUTO: 29.3 PG (ref 26–34)
MCHC RBC AUTO-ENTMCNC: 31.7 G/DL (ref 30–36.5)
MCV RBC AUTO: 92.6 FL (ref 80–99)
MONOCYTES # BLD: 0.67 K/UL (ref 0–1)
MONOCYTES NFR BLD: 6.4 % (ref 5–13)
NEUTS SEG # BLD: 6.64 K/UL (ref 1.8–8)
NEUTS SEG NFR BLD: 63.1 % (ref 32–75)
NRBC # BLD: 0 K/UL (ref 0–0.01)
NRBC BLD-RTO: 0 PER 100 WBC
PERFORMED BY:: ABNORMAL
PERFORMED BY:: NORMAL
PHOSPHATE SERPL-MCNC: 2.5 MG/DL (ref 2.6–4.7)
PLATELET # BLD AUTO: 257 K/UL (ref 150–400)
PMV BLD AUTO: 10.6 FL (ref 8.9–12.9)
POTASSIUM SERPL-SCNC: 3.6 MMOL/L (ref 3.5–5.1)
RBC # BLD AUTO: 3.65 M/UL (ref 3.8–5.2)
SODIUM SERPL-SCNC: 146 MMOL/L (ref 136–145)
WBC # BLD AUTO: 10.5 K/UL (ref 3.6–11)

## 2025-03-16 PROCEDURE — 2580000003 HC RX 258: Performed by: STUDENT IN AN ORGANIZED HEALTH CARE EDUCATION/TRAINING PROGRAM

## 2025-03-16 PROCEDURE — 2500000003 HC RX 250 WO HCPCS: Performed by: STUDENT IN AN ORGANIZED HEALTH CARE EDUCATION/TRAINING PROGRAM

## 2025-03-16 PROCEDURE — 83735 ASSAY OF MAGNESIUM: CPT

## 2025-03-16 PROCEDURE — 84100 ASSAY OF PHOSPHORUS: CPT

## 2025-03-16 PROCEDURE — 6370000000 HC RX 637 (ALT 250 FOR IP): Performed by: STUDENT IN AN ORGANIZED HEALTH CARE EDUCATION/TRAINING PROGRAM

## 2025-03-16 PROCEDURE — 82962 GLUCOSE BLOOD TEST: CPT

## 2025-03-16 PROCEDURE — 85025 COMPLETE CBC W/AUTO DIFF WBC: CPT

## 2025-03-16 PROCEDURE — 6360000002 HC RX W HCPCS: Performed by: STUDENT IN AN ORGANIZED HEALTH CARE EDUCATION/TRAINING PROGRAM

## 2025-03-16 PROCEDURE — 80048 BASIC METABOLIC PNL TOTAL CA: CPT

## 2025-03-16 PROCEDURE — 36415 COLL VENOUS BLD VENIPUNCTURE: CPT

## 2025-03-16 RX ORDER — TOPIRAMATE 200 MG/1
200 TABLET, FILM COATED ORAL 2 TIMES DAILY
Qty: 60 TABLET | Refills: 3 | Status: SHIPPED | OUTPATIENT
Start: 2025-03-16

## 2025-03-16 RX ORDER — LEVETIRACETAM 1000 MG/1
2000 TABLET ORAL 2 TIMES DAILY
Qty: 60 TABLET | Refills: 3 | Status: SHIPPED | OUTPATIENT
Start: 2025-03-16

## 2025-03-16 RX ORDER — LEVETIRACETAM 500 MG/1
2000 TABLET ORAL 2 TIMES DAILY
Status: DISCONTINUED | OUTPATIENT
Start: 2025-03-16 | End: 2025-03-16

## 2025-03-16 RX ORDER — MUPIROCIN 20 MG/G
OINTMENT TOPICAL 2 TIMES DAILY
Status: DISCONTINUED | OUTPATIENT
Start: 2025-03-16 | End: 2025-03-16 | Stop reason: HOSPADM

## 2025-03-16 RX ORDER — LEVETIRACETAM 500 MG/1
2000 TABLET ORAL 2 TIMES DAILY
Status: DISCONTINUED | OUTPATIENT
Start: 2025-03-16 | End: 2025-03-16 | Stop reason: HOSPADM

## 2025-03-16 RX ADMIN — SODIUM CHLORIDE: 0.9 INJECTION, SOLUTION INTRAVENOUS at 08:25

## 2025-03-16 RX ADMIN — ENOXAPARIN SODIUM 30 MG: 100 INJECTION SUBCUTANEOUS at 09:25

## 2025-03-16 RX ADMIN — LEVETIRACETAM 2000 MG: 500 TABLET, FILM COATED ORAL at 14:45

## 2025-03-16 RX ADMIN — SODIUM CHLORIDE, PRESERVATIVE FREE 10 ML: 5 INJECTION INTRAVENOUS at 07:56

## 2025-03-16 RX ADMIN — METHYLPREDNISOLONE SODIUM SUCCINATE 40 MG: 40 INJECTION INTRAMUSCULAR; INTRAVENOUS at 09:22

## 2025-03-16 RX ADMIN — LEVETIRACETAM 2000 MG: 100 INJECTION INTRAVENOUS at 03:25

## 2025-03-16 RX ADMIN — TOPIRAMATE 200 MG: 200 TABLET, FILM COATED ORAL at 09:25

## 2025-03-16 RX ADMIN — BARICITINIB 4 MG: 2 TABLET, FILM COATED ORAL at 09:25

## 2025-03-16 RX ADMIN — MUPIROCIN: 20 OINTMENT TOPICAL at 14:23

## 2025-03-16 RX ADMIN — SODIUM CHLORIDE, POTASSIUM CHLORIDE, SODIUM LACTATE AND CALCIUM CHLORIDE: 600; 310; 30; 20 INJECTION, SOLUTION INTRAVENOUS at 04:25

## 2025-03-16 RX ADMIN — POTASSIUM PHOSPHATE, MONOBASIC POTASSIUM PHOSPHATE, DIBASIC 20 MMOL: 224; 236 INJECTION, SOLUTION, CONCENTRATE INTRAVENOUS at 08:30

## 2025-03-16 ASSESSMENT — PAIN SCALES - GENERAL
PAINLEVEL_OUTOF10: 0
PAINLEVEL_OUTOF10: 0

## 2025-03-16 NOTE — PROGRESS NOTES
CRITICAL CARE PROGRESS NOTE    Name: Umm Curtis   : 1990   MRN: [id number]   Date: 3/16/2025      Diagnoses/problem list:   Angelman syndrome  Epilepsy  Ligated PDA  Vagus nerve stimulant implant    24-hour events:   Umm Curtis is a 34 y.o. female history of Angelman syndrome, seizure disorder, presents with father for evaluation of altered mentation, recurrent seizures.  Patient at baseline communicates with parents with minimal verbiage, sign language due to chronic developmental disorder.  Reportedly has a history of seizures as recurrent seizures daily, yesterday evening father did note mild cough, this a.m. awoke and found patient unresponsive in her wheelchair, was concerned about possible respiratory arrest states he did feel a pulse however took her to the floor and started chest compressions.  Called EMS, EMS started mentioning advanced directives and possibly DNR with father who got very upset asked EMS to leave and drove patient himself to the emergency department.  Patient actively seizing when taken from car to ED.  On my assessment patient nonverbal, postictal.  Father denies any fevers chills, nausea or vomiting over the last couple days, states she was unable to take her seizure medicine yesterday evening or this morning.  Is on Tegretol and Keppra.  In the ED she had 3 more seizures and was given ativan. In addition, she also is COVID positive. ICU has been requested for admission for 24 hour observation and seizure medication adjustments.     3/15: hypotensive this morning and added albumin and NE. Able to advance diet and patient more alert and reportedly at baseline. Teleneurology recommended increasing Keppra.  3/16: no acute events overnight. No further evidence of seizure. NE weaned off and tolerating regular diet    ROS negative except as otherwise documented.     Assessment and plan:     NEUROLOGICAL:    Angelman syndrome  Epilepsy  -no repeated seizures overnight  -Rapid EEG

## 2025-03-16 NOTE — DISCHARGE SUMMARY
Discharge Summary    Date: 3/16/2025  Patient Name: Umm Curtis    YOB: 1990     Age: 34 y.o.    Admit Date: 3/14/2025  Discharge Date: 3/16/2025  Discharge Condition: Good    Admission Diagnosis  Seizure (HCC) [R56.9];Hypoglycemia [E16.2];Breakthrough seizure (HCC) [G40.919];COVID-19 [U07.1]      Discharge Diagnosis  Principal Problem:    Seizure (HCC)  Resolved Problems:    * No resolved hospital problems. *      Hospital Stay  Narrative of Hospital Course:  Umm Curtis is a 34 y.o. female history of Angelman syndrome, seizure disorder, presents with father for evaluation of altered mentation, recurrent seizures.  Patient at baseline communicates with parents with minimal verbiage, sign language due to chronic developmental disorder.  Reportedly has a history of seizures as recurrent seizures daily, yesterday evening father did note mild cough, this a.m. awoke and found patient unresponsive in her wheelchair, was concerned about possible respiratory arrest states he did feel a pulse however took her to the floor and started chest compressions. Patient actively seizing when taken from car to ED.  On my assessment patient nonverbal, postictal.  Father denies any fevers chills, nausea or vomiting over the last couple days, states she was unable to take her seizure medicine yesterday evening or this morning.  Is on Tegretol and Keppra.  In the ED she had 3 more seizures and was given ativan. In addition, she also is COVID positive. ICU has been requested for admission for 24 hour observation and seizure medication adjustments.      3/15: hypotensive this morning and added albumin and NE. Able to advance diet and patient more alert and reportedly at baseline. Teleneurology recommended increasing Keppra.  3/16: no acute events overnight. No further evidence of seizure. NE weaned off and tolerating regular diet. Teleneurology agreed with discharge home and close follow up with her pre-existing

## 2025-03-16 NOTE — PROGRESS NOTES
Patient discharge summary instructions reviewed with patient's father, Stephen Curtis. All patient belongings in care of Stephen Curtis at time of discharge. Patient discharged via wheelchair to family vehicle at 1525.

## 2025-03-16 NOTE — PLAN OF CARE
Problem: Safety - Adult  Goal: Free from fall injury  Outcome: Progressing     Problem: Discharge Planning  Goal: Discharge to home or other facility with appropriate resources  Outcome: Progressing  Flowsheets (Taken 3/15/2025 2000)  Discharge to home or other facility with appropriate resources:   Identify barriers to discharge with patient and caregiver   Arrange for needed discharge resources and transportation as appropriate   Identify discharge learning needs (meds, wound care, etc)   Arrange for interpreters to assist at discharge as needed   Refer to discharge planning if patient needs post-hospital services based on physician order or complex needs related to functional status, cognitive ability or social support system     Problem: Pain  Goal: Verbalizes/displays adequate comfort level or baseline comfort level  Outcome: Progressing  Flowsheets (Taken 3/15/2025 2000)  Verbalizes/displays adequate comfort level or baseline comfort level: Assess pain using appropriate pain scale     Problem: Skin/Tissue Integrity  Goal: Skin integrity remains intact  Description: 1.  Monitor for areas of redness and/or skin breakdown  2.  Assess vascular access sites hourly  3.  Every 4-6 hours minimum:  Change oxygen saturation probe site  4.  Every 4-6 hours:  If on nasal continuous positive airway pressure, respiratory therapy assess nares and determine need for appliance change or resting period  Outcome: Progressing  Flowsheets (Taken 3/15/2025 2000)  Skin Integrity Remains Intact: Monitor for areas of redness and/or skin breakdown

## 2025-03-17 LAB
LEVETIRACETAM SERPL-MCNC: <2 UG/ML (ref 10–40)
TOPIRAMATE SERPL-MCNC: <1.5 UG/ML (ref 2–25)

## 2025-03-18 LAB
BACTERIA SPEC CULT: NORMAL
Lab: NORMAL

## 2025-03-20 LAB
BACTERIA SPEC CULT: NORMAL
BACTERIA SPEC CULT: NORMAL
Lab: NORMAL
Lab: NORMAL